# Patient Record
Sex: MALE | Race: WHITE | NOT HISPANIC OR LATINO | Employment: OTHER | URBAN - METROPOLITAN AREA
[De-identification: names, ages, dates, MRNs, and addresses within clinical notes are randomized per-mention and may not be internally consistent; named-entity substitution may affect disease eponyms.]

---

## 2017-06-14 ENCOUNTER — APPOINTMENT (OUTPATIENT)
Dept: LAB | Facility: CLINIC | Age: 77
End: 2017-06-14
Payer: MEDICARE

## 2017-06-14 DIAGNOSIS — E78.5 HYPERLIPIDEMIA: ICD-10-CM

## 2017-06-14 LAB
ALBUMIN SERPL BCP-MCNC: 3.4 G/DL (ref 3.5–5)
ALP SERPL-CCNC: 84 U/L (ref 46–116)
ALT SERPL W P-5'-P-CCNC: 24 U/L (ref 12–78)
ANION GAP SERPL CALCULATED.3IONS-SCNC: 9 MMOL/L (ref 4–13)
AST SERPL W P-5'-P-CCNC: 19 U/L (ref 5–45)
BASOPHILS # BLD AUTO: 0.03 THOUSANDS/ΜL (ref 0–0.1)
BASOPHILS NFR BLD AUTO: 1 % (ref 0–1)
BILIRUB SERPL-MCNC: 0.88 MG/DL (ref 0.2–1)
BUN SERPL-MCNC: 23 MG/DL (ref 5–25)
CALCIUM SERPL-MCNC: 9.1 MG/DL (ref 8.3–10.1)
CHLORIDE SERPL-SCNC: 108 MMOL/L (ref 100–108)
CHOLEST SERPL-MCNC: 158 MG/DL (ref 50–200)
CO2 SERPL-SCNC: 25 MMOL/L (ref 21–32)
CREAT SERPL-MCNC: 0.92 MG/DL (ref 0.6–1.3)
EOSINOPHIL # BLD AUTO: 0.12 THOUSAND/ΜL (ref 0–0.61)
EOSINOPHIL NFR BLD AUTO: 2 % (ref 0–6)
ERYTHROCYTE [DISTWIDTH] IN BLOOD BY AUTOMATED COUNT: 14.3 % (ref 11.6–15.1)
GFR SERPL CREATININE-BSD FRML MDRD: >60 ML/MIN/1.73SQ M
GLUCOSE P FAST SERPL-MCNC: 94 MG/DL (ref 65–99)
HCT VFR BLD AUTO: 44.5 % (ref 36.5–49.3)
HDLC SERPL-MCNC: 62 MG/DL (ref 40–60)
HGB BLD-MCNC: 15 G/DL (ref 12–17)
LDLC SERPL CALC-MCNC: 78 MG/DL (ref 0–100)
LYMPHOCYTES # BLD AUTO: 1.91 THOUSANDS/ΜL (ref 0.6–4.47)
LYMPHOCYTES NFR BLD AUTO: 34 % (ref 14–44)
MCH RBC QN AUTO: 29.9 PG (ref 26.8–34.3)
MCHC RBC AUTO-ENTMCNC: 33.7 G/DL (ref 31.4–37.4)
MCV RBC AUTO: 89 FL (ref 82–98)
MONOCYTES # BLD AUTO: 0.5 THOUSAND/ΜL (ref 0.17–1.22)
MONOCYTES NFR BLD AUTO: 9 % (ref 4–12)
NEUTROPHILS # BLD AUTO: 3.02 THOUSANDS/ΜL (ref 1.85–7.62)
NEUTS SEG NFR BLD AUTO: 54 % (ref 43–75)
NRBC BLD AUTO-RTO: 0 /100 WBCS
PLATELET # BLD AUTO: 175 THOUSANDS/UL (ref 149–390)
PMV BLD AUTO: 9.5 FL (ref 8.9–12.7)
POTASSIUM SERPL-SCNC: 3.7 MMOL/L (ref 3.5–5.3)
PROT SERPL-MCNC: 6.4 G/DL (ref 6.4–8.2)
RBC # BLD AUTO: 5.02 MILLION/UL (ref 3.88–5.62)
SODIUM SERPL-SCNC: 142 MMOL/L (ref 136–145)
TRIGL SERPL-MCNC: 90 MG/DL
WBC # BLD AUTO: 5.59 THOUSAND/UL (ref 4.31–10.16)

## 2017-06-14 PROCEDURE — 36415 COLL VENOUS BLD VENIPUNCTURE: CPT

## 2017-06-14 PROCEDURE — 85025 COMPLETE CBC W/AUTO DIFF WBC: CPT

## 2017-06-14 PROCEDURE — 80061 LIPID PANEL: CPT

## 2017-06-14 PROCEDURE — 80053 COMPREHEN METABOLIC PANEL: CPT

## 2017-06-15 ENCOUNTER — GENERIC CONVERSION - ENCOUNTER (OUTPATIENT)
Dept: OTHER | Facility: OTHER | Age: 77
End: 2017-06-15

## 2017-06-16 ENCOUNTER — ALLSCRIPTS OFFICE VISIT (OUTPATIENT)
Dept: OTHER | Facility: OTHER | Age: 77
End: 2017-06-16

## 2017-09-11 ENCOUNTER — ALLSCRIPTS OFFICE VISIT (OUTPATIENT)
Dept: OTHER | Facility: OTHER | Age: 77
End: 2017-09-11

## 2017-12-16 DIAGNOSIS — I48.91 ATRIAL FIBRILLATION (HCC): ICD-10-CM

## 2017-12-16 DIAGNOSIS — E78.5 HYPERLIPIDEMIA: ICD-10-CM

## 2017-12-20 ENCOUNTER — ALLSCRIPTS OFFICE VISIT (OUTPATIENT)
Dept: OTHER | Facility: OTHER | Age: 77
End: 2017-12-20

## 2017-12-20 ENCOUNTER — GENERIC CONVERSION - ENCOUNTER (OUTPATIENT)
Dept: OTHER | Facility: OTHER | Age: 77
End: 2017-12-20

## 2018-01-11 NOTE — PROGRESS NOTES
Assessment    1  Encounter for preventive health examination (V70 0) (Z00 00)    Plan  Health Maintenance    · Begin a limited exercise program ; Status:Complete;   Done: 96PDX6052   · There are many exercise options for seniors ; Status:Complete;   Done: 44DMU3760    Discussion/Summary  Impression: Initial Annual Wellness Visit, with preventive exam as well as age and risk appropriate counseling completed  Cardiovascular screening and counseling: screening is current  Diabetes screening and counseling: screening is current  Colorectal cancer screening and counseling: screening is current  Prostate cancer screening and counseling: the risks and benefits of screening were discussed and screening is current  Glaucoma screening and counseling: screening is current  Immunizations: the patient declines the influenza vaccination and the patient declines the pneumococcal vaccination  Advance Directive Planning: paperwork and instructions were given to the patient  Chief Complaint  MEDICARE WELLNESS      History of Present Illness  Welcome to Medicare and Wellness Visits: The patient is being seen for the initial annual wellness visit and His questionnaire was reviewed and a copy is in the chart  Medicare Screening and Risk Factors   Hospitalizations: no previous hospitalizations  Medicare Screening Tests Risk Questions   Drug and Alcohol Use: The patient has never smoked cigarettes  The patient reports drinking 4WINE drinks per week  He has never used illicit drugs  Diet and Physical Activity: Current diet includes frequent junk food, 0 servings of fruit per day, 1 servings of meat per day, 1 cups of coffee per day and 2 GLASS OF WATER  The patient does not exercise  Mood Disorder and Cognitive Impairment Screening: He denies feeling down, depressed, or hopeless over the past two weeks  He denies feeling little interest or pleasure in doing things over the past two weeks     Cognitive impairment screening: difficulty learning/retaining new information, denies difficulty handling complex tasks, denies difficulty with reasoning, denies difficulty with spatial ability and orientation, denies difficulty with language and denies difficulty with behavior  Functional Ability/Level of Safety: Hearing is slightly decreased, slightly decreased in the right ear and slightly decreased in the left ear  The patient is currently able to participate in social activities without limitations and able to drive without limitations  Activities of daily living details: does not need help using the phone, no transportation help needed, does not need help shopping, no meal preparation help needed, does not need help doing housework, does not need help doing laundry, does not need help managing medications and does not need help managing money  Fall risk factors: The patient fell 0 times in the past 12 months  Home safety risk factors:  no unfamiliar surroundings, no loose rugs, no poor household lighting, no uneven floors, no household clutter, grab bars in the bathroom and handrails on the stairs  Advance Directives: Advance directives: no living will, no durable power of  for health care directives and no advance directives  Co-Managers and Medical Equipment/Suppliers: See Patient Care Team      Patient Care Team    Care Team Member Role Specialty Office Number   [facility]  Cardiology [phone]     Review of Systems    Cardiovascular: no chest pain  Respiratory: no shortness of breath  Gastrointestinal: no abdominal pain  Active Problems     1  Glaucoma (365 9) (H40 9)   2  Renal lithiasis (592 0) (N20 0)   3   Screening for prostate cancer (V76 44) (Z12 5)    Benign essential hypertension (401 1) (I10)       Hyperlipidemia (272 4) (E78 5)       Anxiety disorder (300 00) (F41 9)       Atrial fibrillation (427 31) (I48 91)     - Dr Fredi Clancy          Past Medical History    · History of Dog bite of forearm (881 00,E906 0) (T33 089O,E55  0XXA)   · History of epistaxis (V12 69) (B87 438)   · History of Retinal hemorrhage, 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)    The active problems and past medical history were reviewed and updated today  Surgical History    · History of Renal Lithotripsy    The surgical history was reviewed and updated today  Family History  Mother    · Family history of   Father    · Family history of     The family history was reviewed and updated today  Social History    · Former smoker (G44 70) (G02 461)   · Denied: History of High risk sexual behavior   · Denied: History of Illicit drug use   ·    · Retired  The social history was reviewed and updated today  Current Meds   1  Alphagan P 0 1 % Ophthalmic Solution; Therapy: (Recorded:10Fjp7037) to Recorded   2  AmLODIPine Besylate 2 5 MG Oral Tablet; TAKE 1 TABLET DAILY; Last Rx:2016   Ordered   3  Aspir-81 81 MG Oral Tablet Delayed Release; Take 1 tablet daily; Therapy: 09TEX3407 to Recorded   4  Atenolol 25 MG Oral Tablet; TAKE 3 TABLET Daily; Therapy: (Recorded:34Uef3023) to  Requested for: 03Vqt1146 Recorded   5  Atorvastatin Calcium 20 MG Oral Tablet; TAKE 1 TABLET DAILY AT BEDTIME; Last   Rx:2016 Ordered   6  FLUoxetine HCl - 20 MG Oral Capsule; TAKE 1 CAPSULE DAILY; Last Rx:2016   Ordered    The medication list was reviewed and updated today  Allergies    1  No Known Drug Allergies    Immunizations   1    Tdap  16-Oct-2014     Vitals  Signs    Systolic: 879  Diastolic: 92  Heart Rate: 91  Temperature: 98 4 F  O2 Saturation: 99  Height: 5 ft 7 in  Weight: 191 lb 4 00 oz  BMI Calculated: 29 95  BSA Calculated: 1 98    Physical Exam    Constitutional   General appearance: No acute distress, well appearing and well nourished  Pulmonary   Respiratory effort: No increased work of breathing or signs of respiratory distress  Auscultation of lungs: Clear to auscultation  Cardiovascular   Auscultation of heart: Normal rate and rhythm, normal S1 and S2, no murmurs  Health Management  Health Maintenance   *VB - Urinary Incontinence Screen (Dx Z13 89 Screen for UI); every 1 year; Last  49FFI9939; Next Due: 28NIL0915;  Overdue    Future Appointments    Date/Time Provider Specialty Site   03/23/2017 10:20 AM Rhonda Bush MD Internal Medicine [facility] INTERNAL MED     Signatures   Electronically signed by : Naseem Peguero MD; Nov 17 2016 11:17AM EST                       (Author)

## 2018-01-13 VITALS
BODY MASS INDEX: 26.06 KG/M2 | SYSTOLIC BLOOD PRESSURE: 130 MMHG | WEIGHT: 166 LBS | DIASTOLIC BLOOD PRESSURE: 80 MMHG | OXYGEN SATURATION: 99 % | HEIGHT: 67 IN | HEART RATE: 82 BPM

## 2018-01-14 VITALS
WEIGHT: 173 LBS | BODY MASS INDEX: 27.15 KG/M2 | TEMPERATURE: 98.4 F | DIASTOLIC BLOOD PRESSURE: 84 MMHG | SYSTOLIC BLOOD PRESSURE: 126 MMHG | HEIGHT: 67 IN | HEART RATE: 94 BPM | OXYGEN SATURATION: 98 %

## 2018-01-15 NOTE — RESULT NOTES
Verified Results  (1) CBC/PLT/DIFF 79DYL7282 11:54AM Nayeli Arita Order Number: [id number]   Order Number: [id number]     Test Name Result Flag Reference   WBC COUNT 6 48 Thousand/uL  4 31-10 16   RBC COUNT 5 40 Million/uL  3 88-5 62   HEMOGLOBIN 15 7 g/dL  12 0-17 0   HEMATOCRIT 47 1 %  36 5-49 3   MCV 87 fL  82-98   MCH 29 1 pg  26 8-34 3   MCHC 33 3 g/dL  31 4-37 4   RDW 13 6 %  11 6-15 1   MPV 9 1 fL  8 9-12 7   PLATELET COUNT 103 Thousands/uL  149-390   nRBC AUTOMATED 0 /100 WBCs     This is an appended report  These results have been appended to a previously verified report  NEUTROPHILS - REL 65 %  43-75   LYMPHOCYTES - REL 25 %  14-44   MONOCYTES - REL 8 %  4-12   EOSINOPHILS - REL 0 %  0-6   BASOPHILS - REL 0 %  0-1   ATYPICAL LYMPH 2 % H <=0   NEUTROPHILS ABS 4 21 Thousand/uL  1 85-7 62   LYMPHOTCYTES ABS 1 62 Thousand/uL  0 60-4 47   MONOCYTES ABS 0 52 Thousand/uL  0 00-1 22   EOSINOPHILS ABS 0 00 Thousand/uL  0 00-0 40   BASOPHILS ABS 0 00 Thousand/uL  0 00-0 10   TOTAL COUNTED      RBC MORPHOLOGY Normal     PLT ESTIMATE Adequate  Adequate     (1) COMPREHENSIVE METABOLIC PANEL 82PBC9860 62:80FL Nayeli Arita Order Number: [id number]   Order Number: [id number] Order Number: [id number]     Test Name Result Flag Reference   GLUCOSE,RANDM 93 mg/dL     If the patient is fasting, the ADA then defines impaired fasting glucose as > 100 mg/dL and diabetes as > or equal to 123 mg/dL     SODIUM 141 mmol/L  136-145   POTASSIUM 4 3 mmol/L  3 5-5 3   CHLORIDE 105 mmol/L  100-108   CARBON DIOXIDE 28 mmol/L  21-32   ANION GAP (CALC) 8 mmol/L  4-13   BLOOD UREA NITROGEN 21 mg/dL  5-25   CREATININE 1 04 mg/dL  0 60-1 30   Standardized to IDMS reference method   CALCIUM 8 8 mg/dL  8 3-10 1   BILI, TOTAL 0 89 mg/dL  0 20-1 00   ALK PHOSPHATAS 93 U/L     ALT (SGPT) 25 U/L  12-78   AST(SGOT) 18 U/L  5-45   ALBUMIN 3 6 g/dL  3 5-5 0   TOTAL PROTEIN 6 4 g/dL  6 4-8 2   eGFR Non- American      >60 0 ml/min/1 [address] [facility] Disease Education Program recommendations are as follows:  GFR calculation is accurate only with a steady state creatinine  Chronic Kidney disease less than 60 ml/min/1 73 sq  meters  Kidney failure less than 15 ml/min/1 73 sq  meters  (1) LIPID PANEL, FASTING 08Jun2016 11:54AM Nayeli Arita Order Number: [id number]  TW Order Number: [id number] Order Number: [id number]     Test Name Result Flag Reference   CHOLESTEROL 181 mg/dL     HDL,DIRECT 58 mg/dL  40-60   Specimen collection should occur prior to Metamizole administration due to the potential for falsely depressed results  LDL CHOLESTEROL CALCULATED 101 mg/dL H 0-100   Triglyceride:         Normal              <150 mg/dl       Borderline High    150-199 mg/dl       High               200-499 mg/dl       Very High          >499 mg/dl  Cholesterol:         Desirable        <200 mg/dl      Borderline High  200-239 mg/dl      High             >239 mg/dl  HDL Cholesterol:        High    >59 mg/dL      Low     <41 mg/dL  LDL CALCULATED:    This screening LDL is a calculated result  It does not have the accuracy of the Direct Measured LDL in the monitoring of patients with hyperlipidemia and/or statin therapy  Direct Measure LDL (BAG698) must be ordered separately in these patients  TRIGLYCERIDES 111 mg/dL  <=150   Specimen collection should occur prior to N-Acetylcysteine or Metamizole administration due to the potential for falsely depressed results

## 2018-01-23 NOTE — PROGRESS NOTES
Assessment    1  Encounter for preventive health examination (V70 0) (Z00 00)    Plan  Health Maintenance    · Begin a limited exercise program ; Status:Complete;   Done: 08KLI9104   · There are many exercise options for seniors ; Status:Complete;   Done: 13INS8513   · *VB - Urinary Incontinence Screen (Dx Z13 89 Screen for UI) ; every 1 year; Last  15TLF2718; Next 54Qxz4929; Status:Active  Screening for genitourinary condition    · *VB - Urinary Incontinence Screen (Dx Z13 89 Screen for UI); Status:Complete -  Retrospective By Protocol Authorization;   Done: 22ERD9588 11:21AM    Discussion/Summary  Impression: Subsequent Annual Wellness Visit, with preventive exam as well as age and risk appropriate counseling completed  Cardiovascular screening and counseling: screening is current  Diabetes screening and counseling: screening is current  Colorectal cancer screening and counseling: screening not indicated  Prostate cancer screening and counseling: screening not indicated  Glaucoma screening and counseling: screening is current  Advance Directive Planning: paperwork and instructions were given to the patient, he was encouraged to follow-up with me to discuss his questions and/or decisions  Patient Discussion: plan discussed with the patient, follow-up visit needed in one year  History of Present Illness  Welcome to Medicare and Wellness Visits: The patient is being seen for the subsequent annual wellness visit and The patient's questionnaire was reviewed with them and a copy is in the chart as well  Medicare Screening and Risk Factors   Hospitalizations: no previous hospitalizations  Medicare Screening Tests Risk Questions   Drug and Alcohol Use: The patient has never smoked cigarettes  The patient reports drinking 5 drinks per week and wine  Alcohol concern:   The patient has no concerns about alcohol abuse  He has never used illicit drugs     Diet and Physical Activity: Current diet includes frequent junk food, 0 servings of fruit per day, 1 servings of meat per day, 1 servings of whole grains per day, 1 cups of coffee per day and water-3  The patient does not exercise  Mood Disorder and Cognitive Impairment Screening: He denies feeling down, depressed, or hopeless over the past two weeks  He denies feeling little interest or pleasure in doing things over the past two weeks  Cognitive impairment screening: difficulty learning/retaining new information, denies difficulty handling complex tasks, denies difficulty with reasoning, denies difficulty with spatial ability and orientation, denies difficulty with language and denies difficulty with behavior  Functional Ability/Level of Safety: Hearing is normal bilaterally  He does not use a hearing aid  Activities of daily living details: does not need help using the phone, no transportation help needed, does not need help shopping, no meal preparation help needed, does not need help doing housework, does not need help doing laundry, does not need help managing medications and does not need help managing money  Fall risk factors: The patient fell 0 times in the past 12 months  Home safety risk factors:  no unfamiliar surroundings, no loose rugs, no poor household lighting, no uneven floors, no household clutter, grab bars in the bathroom and handrails on the stairs  Advance Directives: Advance directives: no living will, no durable power of  for health care directives and no advance directives  Co-Managers and Medical Equipment/Suppliers: See Patient Care Team      Patient Care Team    Care Team Member Role Specialty Office Number   [facility]  Cardiology [phone]     Review of Systems    Cardiovascular: no chest pain  Respiratory: no shortness of breath  Gastrointestinal: no abdominal pain  Active Problems    1  Glaucoma (365 9) (H40 9)   2  Multiple nevi (216 9) (D22 9)   3   Need for influenza vaccination (V04 81) (Z23)   4  Need for pneumococcal vaccination (V03 82) (Z23)   5  Renal lithiasis (592 0) (N20 0)   6  Screening for prostate cancer (V76 44) (Z12 5)    Past Medical History    · History of Dog bite of forearm (881 00,E906 0) (B64 110Y,E41  0XXA)   · History of epistaxis (V12 69) (X85 569)   · History of Retinal hemorrhage, 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)    The active problems and past medical history were reviewed and updated today  Surgical History    · History of Renal Lithotripsy    The surgical history was reviewed and updated today  Family History  Mother    · Family history of   Father    · Family history of     The family history was reviewed and updated today  Social History    · Former smoker (B25 52) (W07 035)   · Denied: History of High risk sexual behavior   · Denied: History of Illicit drug use   ·    · Retired  The social history was reviewed and updated today  Current Meds   1  AmLODIPine Besylate 2 5 MG Oral Tablet; TAKE 1 TABLET DAILY; Last Rx:2017   Ordered   2  Aspir-81 81 MG Oral Tablet Delayed Release; Take 1 tablet daily; Therapy: 01AMS8246 to Recorded   3  Atenolol 50 MG Oral Tablet; TAKE 1 TABLET TWICE DAILY; Therapy: (Maxwell Watts) to  Requested for: 48Tbb3197 Recorded   4  Atorvastatin Calcium 20 MG Oral Tablet; take 1 tablet daily at bedtime; Last   Rx:2017 Ordered   5  FLUoxetine HCl - 20 MG Oral Capsule; TAKE 1 CAPSULE DAILY; Last Rx:2017   Ordered    The medication list was reviewed and updated today  Allergies    1  No Known Drug Allergies    Immunizations   1    Influenza  Temporarily Deferred: Pt refuses, As of: 72Ezb4601, Defer for 1 Years    PCV  Temporarily Deferred: Pt refuses, As of: 18Vsl6406, Defer for 1 Years    Tdap  16-Oct-2014     Physical Exam    Constitutional   General appearance: No acute distress, well appearing and well nourished      Psychiatric   Judgment and insight: Normal     Orientation to person, place and time: Normal     Recent and remote memory: Intact  Mood and affect: Normal        Results/Data  *VB - Urinary Incontinence Screen (Dx Z13 89 Screen for UI) 79Mdt8980 11:21AM Khushboo Donovan     Test Name Result Flag Reference   Urinary Incontinence Assessment 69Tha6450       Falls Risk Assessment (Dx Z13 89 Screen for Neurologic Disorder) 20Dec2017 11:20AM User, Ahs     Test Name Result Flag Reference   Falls Risk      No falls in the past year     PHQ-9 Adult Depression Screening 20Dec2017 11:20AM User, Ahs     Test Name Result Flag Reference   PHQ-9 Adult Depression Score 4     Over the last two weeks, how often have you been bothered by any of the following problems? Little interest or pleasure in doing things: Several days - 1  Feeling down, depressed, or hopeless: Several days - 1  Trouble falling or staying asleep, or sleeping too much: Several days - 1  Feeling tired or having little energy: Several days - 1  Poor appetite or over eating: Not at all - 0  Feeling bad about yourself - or that you are a failure or have let yourself or your family down: Not at all - 0  Trouble concentrating on things, such as reading the newspaper or watching television: Not at all - 0  Moving or speaking so slowly that other people could have noticed  Or the opposite -  being so fidgety or restless that you have been moving around a lot more than usual: Not at all - 0  Thoughts that you would be better off dead, or of hurting yourself in some way: Not at all - 0   PHQ-9 Adult Depression Screening Negative     PHQ-9 Difficulty Level Not difficult at all     PHQ-9 Severity Minimal Depression         Health Management  Health Maintenance   *VB - Urinary Incontinence Screen (Dx Z13 89 Screen for UI); every 1 year; Last  50Fap8353; Next Due: 22Pat9847;  Active    Future Appointments    Date/Time Provider Specialty Site   06/20/2018 10:40 AM Khushboo Donovan MD Internal Medicine [facility] INTERNAL MED Signatures   Electronically signed by : Leandro Mixon MD; Dec 20 2017 11:41AM EST                       (Author)

## 2018-01-24 VITALS
HEART RATE: 113 BPM | WEIGHT: 174.38 LBS | HEIGHT: 67 IN | OXYGEN SATURATION: 98 % | SYSTOLIC BLOOD PRESSURE: 126 MMHG | TEMPERATURE: 97.2 F | BODY MASS INDEX: 27.37 KG/M2 | DIASTOLIC BLOOD PRESSURE: 74 MMHG

## 2018-04-06 ENCOUNTER — APPOINTMENT (OUTPATIENT)
Dept: LAB | Facility: CLINIC | Age: 78
End: 2018-04-06
Payer: MEDICARE

## 2018-04-06 DIAGNOSIS — I48.91 ATRIAL FIBRILLATION (HCC): ICD-10-CM

## 2018-04-06 DIAGNOSIS — E78.5 HYPERLIPIDEMIA: ICD-10-CM

## 2018-04-06 LAB
ALBUMIN SERPL BCP-MCNC: 3.5 G/DL (ref 3.5–5)
ALP SERPL-CCNC: 104 U/L (ref 46–116)
ALT SERPL W P-5'-P-CCNC: 28 U/L (ref 12–78)
ANION GAP SERPL CALCULATED.3IONS-SCNC: 4 MMOL/L (ref 4–13)
AST SERPL W P-5'-P-CCNC: 22 U/L (ref 5–45)
BASOPHILS # BLD AUTO: 0.04 THOUSANDS/ΜL (ref 0–0.1)
BASOPHILS NFR BLD AUTO: 1 % (ref 0–1)
BILIRUB SERPL-MCNC: 0.9 MG/DL (ref 0.2–1)
BUN SERPL-MCNC: 23 MG/DL (ref 5–25)
CALCIUM SERPL-MCNC: 8.9 MG/DL (ref 8.3–10.1)
CHLORIDE SERPL-SCNC: 106 MMOL/L (ref 100–108)
CHOLEST SERPL-MCNC: 197 MG/DL (ref 50–200)
CO2 SERPL-SCNC: 29 MMOL/L (ref 21–32)
CREAT SERPL-MCNC: 1.16 MG/DL (ref 0.6–1.3)
EOSINOPHIL # BLD AUTO: 0.12 THOUSAND/ΜL (ref 0–0.61)
EOSINOPHIL NFR BLD AUTO: 2 % (ref 0–6)
ERYTHROCYTE [DISTWIDTH] IN BLOOD BY AUTOMATED COUNT: 13.5 % (ref 11.6–15.1)
GFR SERPL CREATININE-BSD FRML MDRD: 60 ML/MIN/1.73SQ M
GLUCOSE P FAST SERPL-MCNC: 97 MG/DL (ref 65–99)
HCT VFR BLD AUTO: 48.5 % (ref 36.5–49.3)
HDLC SERPL-MCNC: 61 MG/DL (ref 40–60)
HGB BLD-MCNC: 15.7 G/DL (ref 12–17)
LDLC SERPL CALC-MCNC: 114 MG/DL (ref 0–100)
LYMPHOCYTES # BLD AUTO: 1.91 THOUSANDS/ΜL (ref 0.6–4.47)
LYMPHOCYTES NFR BLD AUTO: 34 % (ref 14–44)
MCH RBC QN AUTO: 29.7 PG (ref 26.8–34.3)
MCHC RBC AUTO-ENTMCNC: 32.4 G/DL (ref 31.4–37.4)
MCV RBC AUTO: 92 FL (ref 82–98)
MONOCYTES # BLD AUTO: 0.58 THOUSAND/ΜL (ref 0.17–1.22)
MONOCYTES NFR BLD AUTO: 10 % (ref 4–12)
NEUTROPHILS # BLD AUTO: 2.97 THOUSANDS/ΜL (ref 1.85–7.62)
NEUTS SEG NFR BLD AUTO: 53 % (ref 43–75)
NONHDLC SERPL-MCNC: 136 MG/DL
NRBC BLD AUTO-RTO: 0 /100 WBCS
PLATELET # BLD AUTO: 194 THOUSANDS/UL (ref 149–390)
PMV BLD AUTO: 9.4 FL (ref 8.9–12.7)
POTASSIUM SERPL-SCNC: 4.4 MMOL/L (ref 3.5–5.3)
PROT SERPL-MCNC: 6.9 G/DL (ref 6.4–8.2)
RBC # BLD AUTO: 5.28 MILLION/UL (ref 3.88–5.62)
SODIUM SERPL-SCNC: 139 MMOL/L (ref 136–145)
TRIGL SERPL-MCNC: 111 MG/DL
WBC # BLD AUTO: 5.63 THOUSAND/UL (ref 4.31–10.16)

## 2018-04-06 PROCEDURE — 80053 COMPREHEN METABOLIC PANEL: CPT

## 2018-04-06 PROCEDURE — 85025 COMPLETE CBC W/AUTO DIFF WBC: CPT

## 2018-04-06 PROCEDURE — 36415 COLL VENOUS BLD VENIPUNCTURE: CPT

## 2018-04-06 PROCEDURE — 80061 LIPID PANEL: CPT

## 2018-09-14 DIAGNOSIS — F41.9 ANXIETY: Primary | ICD-10-CM

## 2018-09-14 DIAGNOSIS — I10 ESSENTIAL HYPERTENSION: ICD-10-CM

## 2018-09-14 DIAGNOSIS — E78.2 MIXED HYPERLIPIDEMIA: ICD-10-CM

## 2018-09-14 RX ORDER — FLUOXETINE HYDROCHLORIDE 20 MG/1
CAPSULE ORAL
Qty: 90 CAPSULE | Refills: 0 | Status: SHIPPED | OUTPATIENT
Start: 2018-09-14 | End: 2018-12-14 | Stop reason: SDUPTHER

## 2018-09-14 RX ORDER — AMLODIPINE BESYLATE 2.5 MG/1
TABLET ORAL
Qty: 90 TABLET | Refills: 0 | Status: SHIPPED | OUTPATIENT
Start: 2018-09-14 | End: 2018-12-14 | Stop reason: SDUPTHER

## 2018-09-14 RX ORDER — ATORVASTATIN CALCIUM 20 MG/1
TABLET, FILM COATED ORAL
Qty: 90 TABLET | Refills: 0 | Status: SHIPPED | OUTPATIENT
Start: 2018-09-14 | End: 2018-12-14 | Stop reason: SDUPTHER

## 2018-10-12 ENCOUNTER — OFFICE VISIT (OUTPATIENT)
Dept: INTERNAL MEDICINE CLINIC | Facility: CLINIC | Age: 78
End: 2018-10-12
Payer: MEDICARE

## 2018-10-12 VITALS
HEART RATE: 118 BPM | WEIGHT: 188 LBS | HEIGHT: 68 IN | SYSTOLIC BLOOD PRESSURE: 148 MMHG | RESPIRATION RATE: 18 BRPM | OXYGEN SATURATION: 95 % | BODY MASS INDEX: 28.49 KG/M2 | DIASTOLIC BLOOD PRESSURE: 80 MMHG

## 2018-10-12 DIAGNOSIS — Z23 NEED FOR VACCINATION: ICD-10-CM

## 2018-10-12 DIAGNOSIS — I48.91 ATRIAL FIBRILLATION, UNSPECIFIED TYPE (HCC): ICD-10-CM

## 2018-10-12 DIAGNOSIS — Z12.5 SCREENING FOR PROSTATE CANCER: ICD-10-CM

## 2018-10-12 DIAGNOSIS — I10 BENIGN ESSENTIAL HYPERTENSION: Primary | ICD-10-CM

## 2018-10-12 DIAGNOSIS — E78.2 MIXED HYPERLIPIDEMIA: ICD-10-CM

## 2018-10-12 PROCEDURE — 99214 OFFICE O/P EST MOD 30 MIN: CPT | Performed by: INTERNAL MEDICINE

## 2018-10-12 RX ORDER — ATENOLOL 50 MG/1
TABLET ORAL
COMMUNITY
Start: 2018-07-27 | End: 2019-05-17 | Stop reason: SDUPTHER

## 2018-10-12 NOTE — PROGRESS NOTES
Assessment/Plan:      Chronic problems are stable  Continue present medications  Continue diet and exercise  Ordered labs for next visit  Return in about 6 months (around 4/12/2019)  No problem-specific Assessment & Plan notes found for this encounter  Diagnoses and all orders for this visit:    Benign essential hypertension  -     CBC and differential; Future  -     Comprehensive metabolic panel; Future  -     Lipid panel; Future    Mixed hyperlipidemia    Atrial fibrillation, unspecified type ([facility] [address] )    Need for vaccination  -     influenza vaccine, 8692-0640, high-dose, PF 0 5 mL, for patients 65 yr+ (FLUZONE HIGH-DOSE)    Screening for prostate cancer  -     PSA, Total Screen; Future    Other orders  -     atenolol (TENORMIN) 50 mg tablet;           Subjective:      Patient ID: Jen Dang is a 66 y o  male  Patient comes in today for routine follow-up  He states he is doing well with no new complaints  His blood pressure has been controlled  He usually gets anxious coming here  He reports his AFib has been controlled  Initially his heart rate was running a little high but he does not feel anything  Taking his medicines as directed and continues to follow with Cardiology    Reports he had recent carotids which were normal         ALLERGIES:  No Known Allergies    CURRENT MEDICATIONS:    Current Outpatient Prescriptions:     amLODIPine (NORVASC) 2 5 mg tablet, TAKE 1 TABLET BY MOUTH  DAILY, Disp: 90 tablet, Rfl: 0    atenolol (TENORMIN) 50 mg tablet, , Disp: , Rfl:     atorvastatin (LIPITOR) 20 mg tablet, TAKE 1 TABLET BY MOUTH  DAILY AT BEDTIME, Disp: 90 tablet, Rfl: 0    FLUoxetine (PROzac) 20 mg capsule, TAKE 1 CAPSULE BY MOUTH  DAILY, Disp: 90 capsule, Rfl: 0    ACTIVE PROBLEM LIST:  Patient Active Problem List   Diagnosis    Benign essential hypertension    Hyperlipidemia    Glaucoma    Anxiety disorder    Atrial fibrillation (HCC)       PAST MEDICAL HISTORY:  Past Medical History:   Diagnosis Date    Retinal hemorrhage, left     last assessed: 9/2/2015       PAST SURGICAL HISTORY:  Past Surgical History:   Procedure Laterality Date    LITHOTRIPSY         FAMILY HISTORY:  No family history on file  SOCIAL HISTORY:  Social History     Social History    Marital status: /Civil Union     Spouse name: N/A    Number of children: N/A    Years of education: N/A     Occupational History    retired      Social History Main Topics    Smoking status: Former Smoker    Smokeless tobacco: Never Used    Alcohol use Not on file    Drug use: No    Sexual activity: Not on file      Comment: denied: histroy of high risk sexual behavior     Other Topics Concern    Not on file     Social History Narrative    No narrative on file       Review of Systems   Respiratory: Negative for shortness of breath  Cardiovascular: Negative for chest pain  Gastrointestinal: Negative for abdominal pain  Objective:  Vitals:    10/12/18 0938   BP: 148/80   BP Location: Left arm   Patient Position: Sitting   Cuff Size: Adult   Pulse: (!) 118   Resp: 18   SpO2: 95%   Weight: 85 3 kg (188 lb)   Height: 5' 8" (1 727 m)        Physical Exam   Constitutional: He is oriented to person, place, and time  He appears well-developed and well-nourished  Cardiovascular: Normal rate, regular rhythm and normal heart sounds  Pulmonary/Chest: Effort normal and breath sounds normal    Abdominal: Soft  Bowel sounds are normal    Musculoskeletal: He exhibits no edema  Neurological: He is alert and oriented to person, place, and time  Nursing note and vitals reviewed  RESULTS:    No results found for this or any previous visit (from the past 1008 hour(s))  This note was created with voice recognition software  Phonic, grammatical and spelling errors may be present within the note as a result

## 2018-12-14 DIAGNOSIS — E78.2 MIXED HYPERLIPIDEMIA: ICD-10-CM

## 2018-12-14 DIAGNOSIS — I10 ESSENTIAL HYPERTENSION: ICD-10-CM

## 2018-12-14 DIAGNOSIS — F41.9 ANXIETY: ICD-10-CM

## 2018-12-14 RX ORDER — ATORVASTATIN CALCIUM 20 MG/1
TABLET, FILM COATED ORAL
Qty: 90 TABLET | Refills: 1 | Status: SHIPPED | OUTPATIENT
Start: 2018-12-14 | End: 2019-07-18 | Stop reason: SDUPTHER

## 2018-12-14 RX ORDER — FLUOXETINE HYDROCHLORIDE 20 MG/1
CAPSULE ORAL
Qty: 90 CAPSULE | Refills: 1 | Status: SHIPPED | OUTPATIENT
Start: 2018-12-14 | End: 2019-07-18 | Stop reason: SDUPTHER

## 2018-12-14 RX ORDER — AMLODIPINE BESYLATE 2.5 MG/1
TABLET ORAL
Qty: 90 TABLET | Refills: 1 | Status: SHIPPED | OUTPATIENT
Start: 2018-12-14 | End: 2019-07-18 | Stop reason: SDUPTHER

## 2019-05-14 ENCOUNTER — APPOINTMENT (OUTPATIENT)
Dept: LAB | Facility: CLINIC | Age: 79
End: 2019-05-14
Payer: MEDICARE

## 2019-05-14 DIAGNOSIS — Z12.5 SCREENING FOR PROSTATE CANCER: ICD-10-CM

## 2019-05-14 DIAGNOSIS — I10 BENIGN ESSENTIAL HYPERTENSION: ICD-10-CM

## 2019-05-14 LAB
ALBUMIN SERPL BCP-MCNC: 3.7 G/DL (ref 3.5–5)
ALP SERPL-CCNC: 103 U/L (ref 46–116)
ALT SERPL W P-5'-P-CCNC: 37 U/L (ref 12–78)
ANION GAP SERPL CALCULATED.3IONS-SCNC: 4 MMOL/L (ref 4–13)
AST SERPL W P-5'-P-CCNC: 24 U/L (ref 5–45)
BASOPHILS # BLD AUTO: 0.06 THOUSANDS/ΜL (ref 0–0.1)
BASOPHILS NFR BLD AUTO: 1 % (ref 0–1)
BILIRUB SERPL-MCNC: 0.85 MG/DL (ref 0.2–1)
BUN SERPL-MCNC: 15 MG/DL (ref 5–25)
CALCIUM SERPL-MCNC: 9.2 MG/DL (ref 8.3–10.1)
CHLORIDE SERPL-SCNC: 106 MMOL/L (ref 100–108)
CHOLEST SERPL-MCNC: 190 MG/DL (ref 50–200)
CO2 SERPL-SCNC: 30 MMOL/L (ref 21–32)
CREAT SERPL-MCNC: 1.33 MG/DL (ref 0.6–1.3)
EOSINOPHIL # BLD AUTO: 0.12 THOUSAND/ΜL (ref 0–0.61)
EOSINOPHIL NFR BLD AUTO: 2 % (ref 0–6)
ERYTHROCYTE [DISTWIDTH] IN BLOOD BY AUTOMATED COUNT: 13.7 % (ref 11.6–15.1)
GFR SERPL CREATININE-BSD FRML MDRD: 51 ML/MIN/1.73SQ M
GLUCOSE P FAST SERPL-MCNC: 109 MG/DL (ref 65–99)
HCT VFR BLD AUTO: 50.5 % (ref 36.5–49.3)
HDLC SERPL-MCNC: 58 MG/DL (ref 40–60)
HGB BLD-MCNC: 16.1 G/DL (ref 12–17)
IMM GRANULOCYTES # BLD AUTO: 0.01 THOUSAND/UL (ref 0–0.2)
IMM GRANULOCYTES NFR BLD AUTO: 0 % (ref 0–2)
LDLC SERPL CALC-MCNC: 100 MG/DL (ref 0–100)
LYMPHOCYTES # BLD AUTO: 2.09 THOUSANDS/ΜL (ref 0.6–4.47)
LYMPHOCYTES NFR BLD AUTO: 34 % (ref 14–44)
MCH RBC QN AUTO: 29.3 PG (ref 26.8–34.3)
MCHC RBC AUTO-ENTMCNC: 31.9 G/DL (ref 31.4–37.4)
MCV RBC AUTO: 92 FL (ref 82–98)
MONOCYTES # BLD AUTO: 0.58 THOUSAND/ΜL (ref 0.17–1.22)
MONOCYTES NFR BLD AUTO: 10 % (ref 4–12)
NEUTROPHILS # BLD AUTO: 3.21 THOUSANDS/ΜL (ref 1.85–7.62)
NEUTS SEG NFR BLD AUTO: 53 % (ref 43–75)
NONHDLC SERPL-MCNC: 132 MG/DL
NRBC BLD AUTO-RTO: 0 /100 WBCS
PLATELET # BLD AUTO: 209 THOUSANDS/UL (ref 149–390)
PMV BLD AUTO: 9.2 FL (ref 8.9–12.7)
POTASSIUM SERPL-SCNC: 5.1 MMOL/L (ref 3.5–5.3)
PROT SERPL-MCNC: 7.1 G/DL (ref 6.4–8.2)
PSA SERPL-MCNC: 0.7 NG/ML (ref 0–4)
RBC # BLD AUTO: 5.5 MILLION/UL (ref 3.88–5.62)
SODIUM SERPL-SCNC: 140 MMOL/L (ref 136–145)
TRIGL SERPL-MCNC: 160 MG/DL
WBC # BLD AUTO: 6.07 THOUSAND/UL (ref 4.31–10.16)

## 2019-05-14 PROCEDURE — 80061 LIPID PANEL: CPT

## 2019-05-14 PROCEDURE — G0103 PSA SCREENING: HCPCS

## 2019-05-14 PROCEDURE — 36415 COLL VENOUS BLD VENIPUNCTURE: CPT

## 2019-05-14 PROCEDURE — 85025 COMPLETE CBC W/AUTO DIFF WBC: CPT

## 2019-05-14 PROCEDURE — 80053 COMPREHEN METABOLIC PANEL: CPT

## 2019-05-17 ENCOUNTER — OFFICE VISIT (OUTPATIENT)
Dept: INTERNAL MEDICINE CLINIC | Facility: CLINIC | Age: 79
End: 2019-05-17
Payer: MEDICARE

## 2019-05-17 VITALS
HEART RATE: 118 BPM | WEIGHT: 185 LBS | SYSTOLIC BLOOD PRESSURE: 120 MMHG | BODY MASS INDEX: 28.04 KG/M2 | RESPIRATION RATE: 16 BRPM | HEIGHT: 68 IN | OXYGEN SATURATION: 98 % | DIASTOLIC BLOOD PRESSURE: 74 MMHG

## 2019-05-17 DIAGNOSIS — R73.01 IMPAIRED FASTING GLUCOSE: ICD-10-CM

## 2019-05-17 DIAGNOSIS — G25.81 RESTLESS LEG SYNDROME: ICD-10-CM

## 2019-05-17 DIAGNOSIS — I48.91 ATRIAL FIBRILLATION, UNSPECIFIED TYPE (HCC): ICD-10-CM

## 2019-05-17 DIAGNOSIS — E78.2 MIXED HYPERLIPIDEMIA: ICD-10-CM

## 2019-05-17 DIAGNOSIS — F41.9 ANXIETY DISORDER, UNSPECIFIED TYPE: ICD-10-CM

## 2019-05-17 DIAGNOSIS — I10 BENIGN ESSENTIAL HYPERTENSION: Primary | ICD-10-CM

## 2019-05-17 LAB — SL AMB POCT HEMOGLOBIN AIC: 5.6 (ref ?–6.5)

## 2019-05-17 PROCEDURE — 83036 HEMOGLOBIN GLYCOSYLATED A1C: CPT | Performed by: INTERNAL MEDICINE

## 2019-05-17 PROCEDURE — 99214 OFFICE O/P EST MOD 30 MIN: CPT | Performed by: INTERNAL MEDICINE

## 2019-05-17 RX ORDER — ATENOLOL 50 MG/1
50 TABLET ORAL 2 TIMES DAILY
Qty: 60 TABLET
Start: 2019-05-17 | End: 2019-11-21 | Stop reason: SDUPTHER

## 2019-05-17 RX ORDER — ROPINIROLE 0.25 MG/1
0.25 TABLET, FILM COATED ORAL
Qty: 30 TABLET | Refills: 3 | Status: SHIPPED | OUTPATIENT
Start: 2019-05-17 | End: 2019-11-21 | Stop reason: ALTCHOICE

## 2019-07-18 DIAGNOSIS — E78.2 MIXED HYPERLIPIDEMIA: ICD-10-CM

## 2019-07-18 DIAGNOSIS — F41.9 ANXIETY: ICD-10-CM

## 2019-07-18 DIAGNOSIS — I10 ESSENTIAL HYPERTENSION: ICD-10-CM

## 2019-07-18 RX ORDER — ATORVASTATIN CALCIUM 20 MG/1
20 TABLET, FILM COATED ORAL
Qty: 90 TABLET | Refills: 1 | Status: SHIPPED | OUTPATIENT
Start: 2019-07-18 | End: 2019-11-07 | Stop reason: SDUPTHER

## 2019-07-18 RX ORDER — FLUOXETINE HYDROCHLORIDE 20 MG/1
20 CAPSULE ORAL DAILY
Qty: 90 CAPSULE | Refills: 1 | Status: SHIPPED | OUTPATIENT
Start: 2019-07-18 | End: 2019-11-07 | Stop reason: SDUPTHER

## 2019-07-18 RX ORDER — AMLODIPINE BESYLATE 2.5 MG/1
2.5 TABLET ORAL DAILY
Qty: 90 TABLET | Refills: 1 | Status: SHIPPED | OUTPATIENT
Start: 2019-07-18 | End: 2019-11-07 | Stop reason: SDUPTHER

## 2019-09-03 ENCOUNTER — OFFICE VISIT (OUTPATIENT)
Dept: INTERNAL MEDICINE CLINIC | Facility: CLINIC | Age: 79
End: 2019-09-03
Payer: MEDICARE

## 2019-09-03 VITALS
SYSTOLIC BLOOD PRESSURE: 140 MMHG | DIASTOLIC BLOOD PRESSURE: 90 MMHG | RESPIRATION RATE: 16 BRPM | WEIGHT: 181 LBS | HEART RATE: 100 BPM | BODY MASS INDEX: 27.43 KG/M2 | HEIGHT: 68 IN | OXYGEN SATURATION: 98 %

## 2019-09-03 DIAGNOSIS — F43.21 GRIEF: Primary | ICD-10-CM

## 2019-09-03 PROCEDURE — 99213 OFFICE O/P EST LOW 20 MIN: CPT | Performed by: PHYSICIAN ASSISTANT

## 2019-09-03 RX ORDER — LORAZEPAM 0.5 MG/1
0.5 TABLET ORAL 2 TIMES DAILY PRN
Qty: 10 TABLET | Refills: 0 | Status: SHIPPED | OUTPATIENT
Start: 2019-09-03 | End: 2019-11-21 | Stop reason: ALTCHOICE

## 2019-09-03 NOTE — PROGRESS NOTES
Assessment/Plan:      Patient Instructions   Will make low-dose lorazepam available for anxiety  Patient will make follow-up appointment in 1 week for ear irrigation  BMI Counseling: Body mass index is 27 52 kg/m²  Discussed the patient's BMI with him  The BMI is above average  BMI counseling and education was provided to the patient  Nutrition recommendations include reducing portion sizes and decreasing overall calorie intake  Return in about 1 week (around 9/10/2019) for Next scheduled follow up-ear irrigation  Diagnoses and all orders for this visit:    Grief  -     LORazepam (ATIVAN) 0 5 mg tablet; Take 1 tablet (0 5 mg total) by mouth 2 (two) times a day as needed for anxiety          Subjective:      Patient ID: Ean Torrez is a 78 y o  male  Acute visit    75-year-old male presents accompanied by his wife tearful because his dog of 11 5 years  suddenly within the past week  This patient was very close to this animal, and has had a difficult time coping  He generally has a difficult time in the morning and usually at night when he is trying to go to sleep  His wife also is grieving but not to the extent the patient is  When discussing his situation there multiple times when he broke down in tears  He himself feels this is silly and that he should have better control but he is not able to at this time        ALLERGIES:  No Known Allergies    CURRENT MEDICATIONS:    Current Outpatient Medications:     amLODIPine (NORVASC) 2 5 mg tablet, Take 1 tablet (2 5 mg total) by mouth daily, Disp: 90 tablet, Rfl: 1    atenolol (TENORMIN) 50 mg tablet, Take 1 tablet (50 mg total) by mouth 2 (two) times a day, Disp: 60 tablet, Rfl:     atorvastatin (LIPITOR) 20 mg tablet, Take 1 tablet (20 mg total) by mouth daily at bedtime, Disp: 90 tablet, Rfl: 1    FLUoxetine (PROzac) 20 mg capsule, Take 1 capsule (20 mg total) by mouth daily, Disp: 90 capsule, Rfl: 1    LORazepam (ATIVAN) 0 5 mg tablet, Take 1 tablet (0 5 mg total) by mouth 2 (two) times a day as needed for anxiety, Disp: 10 tablet, Rfl: 0    rOPINIRole (REQUIP) 0 25 mg tablet, Take 1 tablet (0 25 mg total) by mouth daily at bedtime (Patient not taking: Reported on 9/3/2019), Disp: 30 tablet, Rfl: 3    ACTIVE PROBLEM LIST:  Patient Active Problem List   Diagnosis    Benign essential hypertension    Hyperlipidemia    Glaucoma    Anxiety disorder    Atrial fibrillation [facility])       PAST MEDICAL HISTORY:  Past Medical History:   Diagnosis Date    Retinal hemorrhage, left     last assessed: 9/2/2015       PAST SURGICAL HISTORY:  Past Surgical History:   Procedure Laterality Date    LITHOTRIPSY         FAMILY HISTORY:  History reviewed  No pertinent family history      SOCIAL HISTORY:  Social History     Socioeconomic History    Marital status: /Civil Union     Spouse name: Not on file    Number of children: Not on file    Years of education: Not on file    Highest education level: Not on file   Occupational History    Occupation: retired   Social Needs    Financial resource strain: Not on file    Food insecurity:     Worry: Not on file     Inability: Not on file   [employer] needs:     Medical: Not on file     Non-medical: Not on file   Tobacco Use    Smoking status: Former Smoker    Smokeless tobacco: Never Used   Substance and Sexual Activity    Alcohol use: Not on file    Drug use: No    Sexual activity: Not on file     Comment: denied: histroy of high risk sexual behavior   Lifestyle    Physical activity:     Days per week: Not on file     Minutes per session: Not on file    Stress: Not on file   Relationships    Social connections:     Talks on phone: Not on file     Gets together: Not on file     Attends [employer] service: Not on file     Active member of club or organization: Not on file     Attends meetings of clubs or organizations: Not on file     Relationship status: Not on file    Intimate partner violence:     Fear of current or ex partner: Not on file     Emotionally abused: Not on file     Physically abused: Not on file     Forced sexual activity: Not on file   Other Topics Concern    Not on file   Social History Narrative    Not on file       Review of Systems   Constitutional: Negative for activity change, chills, fatigue and fever  HENT: Negative for congestion  Eyes: Negative for discharge  Respiratory: Negative for cough, chest tightness and shortness of breath  Cardiovascular: Negative for chest pain, palpitations and leg swelling  Gastrointestinal: Negative for abdominal pain  Genitourinary: Negative for difficulty urinating  Musculoskeletal: Negative for arthralgias and myalgias  Skin: Negative for rash  Allergic/Immunologic: Negative for immunocompromised state  Neurological: Negative for dizziness, syncope, weakness, light-headedness and headaches  Hematological: Negative for adenopathy  Does not bruise/bleed easily  Psychiatric/Behavioral: Negative for dysphoric mood, sleep disturbance and suicidal ideas  The patient is nervous/anxious  Objective:  Vitals:    09/03/19 1521   BP: 140/90   BP Location: Right arm   Patient Position: Sitting   Cuff Size: Standard   Pulse: (!) 126   Resp: 16   SpO2: 98%   Weight: 82 1 kg (181 lb)   Height: 5' 8" (1 727 m)     Body mass index is 27 52 kg/m²  Physical Exam   Constitutional: He is oriented to person, place, and time  He appears well-developed and well-nourished  He appears distressed  Cardiovascular: Normal heart sounds  An irregularly irregular rhythm present  Tachycardia present  Pulmonary/Chest: Effort normal and breath sounds normal    Musculoskeletal: He exhibits no edema  Neurological: He is alert and oriented to person, place, and time  Skin: Skin is warm and dry  No rash noted  Psychiatric: He has a normal mood and affect  His behavior is normal    Tearful when discussing current situation  Nursing note and vitals reviewed  RESULTS:    No results found for this or any previous visit (from the past 1008 hour(s))  This note was created with voice recognition software  Phonic, grammatical and spelling errors may be present within the note as a result

## 2019-09-03 NOTE — PATIENT INSTRUCTIONS
Will make low-dose lorazepam available for anxiety  Patient will make follow-up appointment in 1 week for ear irrigation

## 2019-09-12 ENCOUNTER — PROCEDURE VISIT (OUTPATIENT)
Dept: INTERNAL MEDICINE CLINIC | Facility: CLINIC | Age: 79
End: 2019-09-12
Payer: MEDICARE

## 2019-09-12 VITALS
OXYGEN SATURATION: 98 % | HEIGHT: 68 IN | BODY MASS INDEX: 27.58 KG/M2 | HEART RATE: 70 BPM | RESPIRATION RATE: 16 BRPM | WEIGHT: 182 LBS | SYSTOLIC BLOOD PRESSURE: 124 MMHG | DIASTOLIC BLOOD PRESSURE: 78 MMHG

## 2019-09-12 DIAGNOSIS — H61.21 HEARING LOSS DUE TO CERUMEN IMPACTION, RIGHT: Primary | ICD-10-CM

## 2019-09-12 DIAGNOSIS — F41.9 ANXIETY DISORDER, UNSPECIFIED TYPE: ICD-10-CM

## 2019-09-12 PROCEDURE — 69209 REMOVE IMPACTED EAR WAX UNI: CPT | Performed by: PHYSICIAN ASSISTANT

## 2019-09-12 RX ORDER — BUSPIRONE HYDROCHLORIDE 5 MG/1
5 TABLET ORAL 2 TIMES DAILY
Qty: 60 TABLET | Refills: 1 | Status: SHIPPED | OUTPATIENT
Start: 2019-09-12 | End: 2019-11-08 | Stop reason: SDUPTHER

## 2019-09-12 NOTE — PROGRESS NOTES
Assessment/Plan:   Patient Instructions   Return for follow up visit  BMI Counseling: Body mass index is 27 67 kg/m²  Discussed the patient's BMI with him  The BMI is above normal  Nutrition recommendations include reducing portion sizes and decreasing overall calorie intake  Return for Next scheduled follow up  Diagnoses and all orders for this visit:    Hearing loss due to cerumen impaction, right  -     Ear cerumen removal; Future    Anxiety disorder, unspecified type          Subjective:      Patient ID: Rafy Nation is a 78 y o  male  Follow-up    Decreased hearing right ear secondary to cerumen impaction  Here for removal   Has used 1 week of Debrox        ALLERGIES:  No Known Allergies    CURRENT MEDICATIONS:    Current Outpatient Medications:     amLODIPine (NORVASC) 2 5 mg tablet, Take 1 tablet (2 5 mg total) by mouth daily, Disp: 90 tablet, Rfl: 1    atenolol (TENORMIN) 50 mg tablet, Take 1 tablet (50 mg total) by mouth 2 (two) times a day, Disp: 60 tablet, Rfl:     atorvastatin (LIPITOR) 20 mg tablet, Take 1 tablet (20 mg total) by mouth daily at bedtime, Disp: 90 tablet, Rfl: 1    FLUoxetine (PROzac) 20 mg capsule, Take 1 capsule (20 mg total) by mouth daily, Disp: 90 capsule, Rfl: 1    LORazepam (ATIVAN) 0 5 mg tablet, Take 1 tablet (0 5 mg total) by mouth 2 (two) times a day as needed for anxiety, Disp: 10 tablet, Rfl: 0    rOPINIRole (REQUIP) 0 25 mg tablet, Take 1 tablet (0 25 mg total) by mouth daily at bedtime (Patient not taking: Reported on 9/3/2019), Disp: 30 tablet, Rfl: 3    ACTIVE PROBLEM LIST:  Patient Active Problem List   Diagnosis    Benign essential hypertension    Hyperlipidemia    Glaucoma    Anxiety disorder    Atrial fibrillation [facility])       PAST MEDICAL HISTORY:  Past Medical History:   Diagnosis Date    Retinal hemorrhage, left     last assessed: 9/2/2015       PAST SURGICAL HISTORY:  Past Surgical History:   Procedure Laterality Date    LITHOTRIPSY         FAMILY HISTORY:  History reviewed  No pertinent family history  SOCIAL HISTORY:  Social History     Socioeconomic History    Marital status: /Civil Union     Spouse name: Not on file    Number of children: Not on file    Years of education: Not on file    Highest education level: Not on file   Occupational History    Occupation: retired   Social Needs    Financial resource strain: Not on file    Food insecurity:     Worry: Not on file     Inability: Not on file   [email or web address] needs:     Medical: Not on file     Non-medical: Not on file   Tobacco Use    Smoking status: Former Smoker    Smokeless tobacco: Never Used   Substance and Sexual Activity    Alcohol use: Not on file    Drug use: No    Sexual activity: Not on file     Comment: denied: histroy of high risk sexual behavior   Lifestyle    Physical activity:     Days per week: Not on file     Minutes per session: Not on file    Stress: Not on file   Relationships    Social connections:     Talks on phone: Not on file     Gets together: Not on file     Attends Pentecostalism service: Not on file     Active member of club or organization: Not on file     Attends meetings of clubs or organizations: Not on file     Relationship status: Not on file    Intimate partner violence:     Fear of current or ex partner: Not on file     Emotionally abused: Not on file     Physically abused: Not on file     Forced sexual activity: Not on file   Other Topics Concern    Not on file   Social History Narrative    Not on file       Review of Systems   HENT: Positive for hearing loss  Psychiatric/Behavioral: Negative for sleep disturbance and suicidal ideas  The patient is nervous/anxious            Objective:  Vitals:    09/12/19 0835   BP: 158/100   BP Location: Left arm   Patient Position: Sitting   Cuff Size: Adult   Pulse: 70   Resp: 16   SpO2: 98%   Weight: 82 6 kg (182 lb)   Height: 5' 8" (1 727 m)     Body mass index is 27 67 kg/m²  Physical Exam   Constitutional: He is oriented to person, place, and time  He appears well-developed and well-nourished  No distress  HENT:   Right tympanic membrane not visible due to cerumen impaction in external auditory canal   Cardiovascular: Normal rate  An irregularly irregular rhythm present  Pulmonary/Chest: Effort normal and breath sounds normal    Musculoskeletal: He exhibits no edema  Neurological: He is alert and oriented to person, place, and time  Skin: Skin is warm and dry  No rash noted  Psychiatric: He has a normal mood and affect  His behavior is normal          Ear cerumen removal  Date/Time: 9/12/2019 9:10 AM  Performed by: Delilah Lugo PA-C  Authorized by: Delilah Lugo PA-C     Patient location:  Clinic  Other Assisting Provider: No    Consent:     Consent obtained:  Verbal    Consent given by:  Patient    Risks discussed:  Bleeding, dizziness, incomplete removal, pain, TM perforation and infection    Alternatives discussed:  No treatment and referral  Universal protocol:     Procedure explained and questions answered to patient or proxy's satisfaction: yes      Patient identity confirmed:  Verbally with patient  Procedure details:     Local anesthetic:  None    Location:  R ear    Procedure type: irrigation only      Approach:  External and natural orifice    Equipment used:  Otoscope  Post-procedure details:     Complication:  None    Hearing quality:  Improved    Patient tolerance of procedure: Tolerated well, no immediate complications  Comments:      Postprocedure TM appears within normal limits      RESULTS:    No results found for this or any previous visit (from the past 1008 hour(s))  This note was created with voice recognition software  Phonic, grammatical and spelling errors may be present within the note as a result

## 2019-11-07 DIAGNOSIS — F41.9 ANXIETY: ICD-10-CM

## 2019-11-07 DIAGNOSIS — I10 ESSENTIAL HYPERTENSION: ICD-10-CM

## 2019-11-07 DIAGNOSIS — E78.2 MIXED HYPERLIPIDEMIA: ICD-10-CM

## 2019-11-07 RX ORDER — FLUOXETINE HYDROCHLORIDE 20 MG/1
CAPSULE ORAL
Qty: 90 CAPSULE | Refills: 1 | Status: SHIPPED | OUTPATIENT
Start: 2019-11-07 | End: 2019-12-12 | Stop reason: SDUPTHER

## 2019-11-07 RX ORDER — AMLODIPINE BESYLATE 2.5 MG/1
TABLET ORAL
Qty: 90 TABLET | Refills: 1 | Status: SHIPPED | OUTPATIENT
Start: 2019-11-07 | End: 2019-12-12 | Stop reason: SDUPTHER

## 2019-11-07 RX ORDER — ATORVASTATIN CALCIUM 20 MG/1
TABLET, FILM COATED ORAL
Qty: 90 TABLET | Refills: 1 | Status: SHIPPED | OUTPATIENT
Start: 2019-11-07 | End: 2019-12-12 | Stop reason: SDUPTHER

## 2019-11-08 DIAGNOSIS — F41.9 ANXIETY DISORDER, UNSPECIFIED TYPE: ICD-10-CM

## 2019-11-08 RX ORDER — BUSPIRONE HYDROCHLORIDE 5 MG/1
5 TABLET ORAL 2 TIMES DAILY
Qty: 90 TABLET | Refills: 1 | Status: SHIPPED | OUTPATIENT
Start: 2019-11-08 | End: 2021-07-06 | Stop reason: ALTCHOICE

## 2019-11-18 ENCOUNTER — APPOINTMENT (OUTPATIENT)
Dept: LAB | Facility: CLINIC | Age: 79
End: 2019-11-18
Payer: MEDICARE

## 2019-11-18 DIAGNOSIS — I10 BENIGN ESSENTIAL HYPERTENSION: ICD-10-CM

## 2019-11-18 LAB
ALBUMIN SERPL BCP-MCNC: 3.8 G/DL (ref 3.5–5)
ALP SERPL-CCNC: 95 U/L (ref 46–116)
ALT SERPL W P-5'-P-CCNC: 26 U/L (ref 12–78)
ANION GAP SERPL CALCULATED.3IONS-SCNC: 9 MMOL/L (ref 4–13)
AST SERPL W P-5'-P-CCNC: 16 U/L (ref 5–45)
BASOPHILS # BLD AUTO: 0.05 THOUSANDS/ΜL (ref 0–0.1)
BASOPHILS NFR BLD AUTO: 1 % (ref 0–1)
BILIRUB SERPL-MCNC: 0.72 MG/DL (ref 0.2–1)
BUN SERPL-MCNC: 28 MG/DL (ref 5–25)
CALCIUM SERPL-MCNC: 9.2 MG/DL (ref 8.3–10.1)
CHLORIDE SERPL-SCNC: 110 MMOL/L (ref 100–108)
CHOLEST SERPL-MCNC: 170 MG/DL (ref 50–200)
CO2 SERPL-SCNC: 25 MMOL/L (ref 21–32)
CREAT SERPL-MCNC: 1.2 MG/DL (ref 0.6–1.3)
EOSINOPHIL # BLD AUTO: 0.1 THOUSAND/ΜL (ref 0–0.61)
EOSINOPHIL NFR BLD AUTO: 2 % (ref 0–6)
ERYTHROCYTE [DISTWIDTH] IN BLOOD BY AUTOMATED COUNT: 13.5 % (ref 11.6–15.1)
GFR SERPL CREATININE-BSD FRML MDRD: 57 ML/MIN/1.73SQ M
GLUCOSE P FAST SERPL-MCNC: 98 MG/DL (ref 65–99)
HCT VFR BLD AUTO: 47.3 % (ref 36.5–49.3)
HDLC SERPL-MCNC: 56 MG/DL
HGB BLD-MCNC: 15.4 G/DL (ref 12–17)
IMM GRANULOCYTES # BLD AUTO: 0.02 THOUSAND/UL (ref 0–0.2)
IMM GRANULOCYTES NFR BLD AUTO: 0 % (ref 0–2)
LDLC SERPL CALC-MCNC: 92 MG/DL (ref 0–100)
LYMPHOCYTES # BLD AUTO: 2.31 THOUSANDS/ΜL (ref 0.6–4.47)
LYMPHOCYTES NFR BLD AUTO: 37 % (ref 14–44)
MCH RBC QN AUTO: 30 PG (ref 26.8–34.3)
MCHC RBC AUTO-ENTMCNC: 32.6 G/DL (ref 31.4–37.4)
MCV RBC AUTO: 92 FL (ref 82–98)
MONOCYTES # BLD AUTO: 0.65 THOUSAND/ΜL (ref 0.17–1.22)
MONOCYTES NFR BLD AUTO: 10 % (ref 4–12)
NEUTROPHILS # BLD AUTO: 3.11 THOUSANDS/ΜL (ref 1.85–7.62)
NEUTS SEG NFR BLD AUTO: 50 % (ref 43–75)
NONHDLC SERPL-MCNC: 114 MG/DL
NRBC BLD AUTO-RTO: 0 /100 WBCS
PLATELET # BLD AUTO: 183 THOUSANDS/UL (ref 149–390)
PMV BLD AUTO: 9.3 FL (ref 8.9–12.7)
POTASSIUM SERPL-SCNC: 4.3 MMOL/L (ref 3.5–5.3)
PROT SERPL-MCNC: 6.8 G/DL (ref 6.4–8.2)
RBC # BLD AUTO: 5.14 MILLION/UL (ref 3.88–5.62)
SODIUM SERPL-SCNC: 144 MMOL/L (ref 136–145)
TRIGL SERPL-MCNC: 111 MG/DL
TSH SERPL DL<=0.05 MIU/L-ACNC: 3.4 UIU/ML (ref 0.36–3.74)
WBC # BLD AUTO: 6.24 THOUSAND/UL (ref 4.31–10.16)

## 2019-11-18 PROCEDURE — 85025 COMPLETE CBC W/AUTO DIFF WBC: CPT

## 2019-11-18 PROCEDURE — 84443 ASSAY THYROID STIM HORMONE: CPT

## 2019-11-18 PROCEDURE — 80061 LIPID PANEL: CPT

## 2019-11-18 PROCEDURE — 36415 COLL VENOUS BLD VENIPUNCTURE: CPT

## 2019-11-18 PROCEDURE — 80053 COMPREHEN METABOLIC PANEL: CPT

## 2019-11-21 ENCOUNTER — OFFICE VISIT (OUTPATIENT)
Dept: INTERNAL MEDICINE CLINIC | Facility: CLINIC | Age: 79
End: 2019-11-21
Payer: MEDICARE

## 2019-11-21 VITALS
OXYGEN SATURATION: 98 % | WEIGHT: 183 LBS | BODY MASS INDEX: 27.74 KG/M2 | SYSTOLIC BLOOD PRESSURE: 134 MMHG | HEART RATE: 110 BPM | RESPIRATION RATE: 18 BRPM | HEIGHT: 68 IN | DIASTOLIC BLOOD PRESSURE: 86 MMHG

## 2019-11-21 DIAGNOSIS — I10 BENIGN ESSENTIAL HYPERTENSION: ICD-10-CM

## 2019-11-21 DIAGNOSIS — E78.2 MIXED HYPERLIPIDEMIA: ICD-10-CM

## 2019-11-21 DIAGNOSIS — Z00.00 MEDICARE ANNUAL WELLNESS VISIT, SUBSEQUENT: Primary | ICD-10-CM

## 2019-11-21 DIAGNOSIS — I48.91 ATRIAL FIBRILLATION, UNSPECIFIED TYPE (HCC): ICD-10-CM

## 2019-11-21 PROCEDURE — G0439 PPPS, SUBSEQ VISIT: HCPCS | Performed by: INTERNAL MEDICINE

## 2019-11-21 PROCEDURE — 99214 OFFICE O/P EST MOD 30 MIN: CPT | Performed by: INTERNAL MEDICINE

## 2019-11-21 RX ORDER — ATENOLOL 50 MG/1
75 TABLET ORAL 2 TIMES DAILY
Qty: 60 TABLET
Start: 2019-11-21

## 2019-11-21 NOTE — PROGRESS NOTES
Assessment and Plan:     Problem List Items Addressed This Visit     None           Preventive health issues were discussed with patient, and age appropriate screening tests were ordered as noted in patient's After Visit Summary  Personalized health advice and appropriate referrals for health education or preventive services given if needed, as noted in patient's After Visit Summary  History of Present Illness:     Patient presents for Welcome to Medicare visit  Patient Care Team:  Alfonzo Marion MD as PCP - General     Review of Systems:     Review of Systems   Respiratory: Negative for shortness of breath  Cardiovascular: Negative for chest pain  Gastrointestinal: Negative for abdominal pain  Problem List:     Patient Active Problem List   Diagnosis    Benign essential hypertension    Hyperlipidemia    Glaucoma    Anxiety disorder    Atrial fibrillation [facility])      Past Medical and Surgical History:     Past Medical History:   Diagnosis Date    Retinal hemorrhage, left     last assessed: 9/2/2015     Past Surgical History:   Procedure Laterality Date    LITHOTRIPSY        Family History:     No family history on file     Social History:     Social History     Socioeconomic History    Marital status: /Civil Union     Spouse name: Not on file    Number of children: Not on file    Years of education: Not on file    Highest education level: Not on file   Occupational History    Occupation: retired   Social Needs    Financial resource strain: Not on file    Food insecurity:     Worry: Not on file     Inability: Not on file   [employer] needs:     Medical: Not on file     Non-medical: Not on file   Tobacco Use    Smoking status: Former Smoker    Smokeless tobacco: Never Used   Substance and Sexual Activity    Alcohol use: Not on file    Drug use: No    Sexual activity: Not on file     Comment: denied: histroy of high risk sexual behavior   Lifestyle    Physical activity: Days per week: Not on file     Minutes per session: Not on file    Stress: Not on file   Relationships    Social connections:     Talks on phone: Not on file     Gets together: Not on file     Attends Taoism service: Not on file     Active member of club or organization: Not on file     Attends meetings of clubs or organizations: Not on file     Relationship status: Not on file    Intimate partner violence:     Fear of current or ex partner: Not on file     Emotionally abused: Not on file     Physically abused: Not on file     Forced sexual activity: Not on file   Other Topics Concern    Not on file   Social History Narrative    Not on file      Medications and Allergies:     Current Outpatient Medications   Medication Sig Dispense Refill    amLODIPine (NORVASC) 2 5 mg tablet TAKE 1 TABLET BY MOUTH  DAILY 90 tablet 1    atenolol (TENORMIN) 50 mg tablet Take 1 tablet (50 mg total) by mouth 2 (two) times a day 60 tablet     atorvastatin (LIPITOR) 20 mg tablet TAKE 1 TABLET BY MOUTH  DAILY AT BEDTIME 90 tablet 1    busPIRone (BUSPAR) 5 mg tablet Take 1 tablet (5 mg total) by mouth 2 (two) times a day 90 tablet 1    FLUoxetine (PROzac) 20 mg capsule TAKE 1 CAPSULE BY MOUTH  DAILY 90 capsule 1    LORazepam (ATIVAN) 0 5 mg tablet Take 1 tablet (0 5 mg total) by mouth 2 (two) times a day as needed for anxiety 10 tablet 0    rOPINIRole (REQUIP) 0 25 mg tablet Take 1 tablet (0 25 mg total) by mouth daily at bedtime (Patient not taking: Reported on 9/3/2019) 30 tablet 3     No current facility-administered medications for this visit  No Known Allergies   Immunizations:     Immunization History   Administered Date(s) Administered    Tdap 10/16/2014      Health Maintenance: There are no preventive care reminders to display for this patient        Topic Date Due    Pneumococcal Vaccine: 65+ Years (1 of 2 - PCV13) 05/19/2005    Influenza Vaccine  07/01/2019      Medicare Screening Tests and Risk Assessments:     Karla Moya is here for his Subsequent Wellness visit  Last Medicare Wellness visit information reviewed, patient interviewed, no change since last AWV  Health Risk Assessment:   Patient rates overall health as good  Patient feels that their physical health rating is slightly better  Eyesight was rated as slightly worse  Hearing was rated as slightly worse  Patient feels that their emotional and mental health rating is slightly worse  Pain experienced in the last 7 days has been some  Patient's pain rating has been 3/10  Patient states that he has experienced no weight loss or gain in last 6 months  Depression Screening:   PHQ-2 Score: 0      Fall Risk Screening: In the past year, patient has experienced: history of falling in past year    Number of falls: 1  Injured during fall?: No    Feels unsteady when standing or walking?: No    Worried about falling?: No      Home Safety:  Patient does not have trouble with stairs inside or outside of their home  Patient has no working smoke alarms and has no working carbon monoxide detector  Home safety hazards include: none  Nutrition:   Current diet is Regular and Limited junk food  Medications:   Patient is currently taking over-the-counter supplements  OTC medications include: see medication list  Patient is able to manage medications  Activities of Daily Living (ADLs)/Instrumental Activities of Daily Living (IADLs):   Walk and transfer into and out of bed and chair?: Yes  Dress and groom yourself?: Yes    Bathe or shower yourself?: Yes    Feed yourself?  Yes  Do your laundry/housekeeping?: Yes  Manage your money, pay your bills and track your expenses?: Yes  Make your own meals?: Yes    Do your own shopping?: Yes    Advance Care Planning:   Living will: No    Advanced directive counseling given: Yes      Cognitive Screening:   Provider or family/friend/caregiver concerned regarding cognition?: No    PREVENTIVE SCREENINGS Cardiovascular Screening:    General: Screening Not Indicated and History Lipid Disorder      Diabetes Screening:     General: Screening Current      Prostate Cancer Screening:    General: Screening Not Indicated      Osteoporosis Screening:    General: Screening Not Indicated      Abdominal Aortic Aneurysm (AAA) Screening:    Risk factors include: tobacco use        General: Screening Not Indicated      Lung Cancer Screening:     General: Screening Not Indicated      Hepatitis C Screening:    General: Screening Not Indicated    No exam data present     Physical Exam:     There were no vitals taken for this visit      Physical Exam    Neo Matos MD

## 2019-11-21 NOTE — PROGRESS NOTES
Assessment/Plan:     Chronic problems are stable  Continue present medications  Continue diet and exercise  Ordered labs for next visit  I also told him to consider whether he wants to increase his fluoxetine or BuSpar  Quality Measures:       Return in about 6 months (around 5/21/2020)  No problem-specific Assessment & Plan notes found for this encounter  Diagnoses and all orders for this visit:    Benign essential hypertension  -     atenolol (TENORMIN) 50 mg tablet; Take 1 5 tablets (75 mg total) by mouth 2 (two) times a day  -     CBC and differential; Future  -     Comprehensive metabolic panel; Future  -     Lipid panel; Future    Atrial fibrillation, unspecified type ([facility] [address] )    Mixed hyperlipidemia          Subjective:      Patient ID: Sheryl Newby is a 78 y o  male  Patient comes in today for routine follow-up and Medicare wellness  His blood pressure is controlled  Cholesterol is controlled  Still seeing Cardiology for his atrial fibrillation  Heart rate control remains variable  He is taking his medicine as directed  He admits that the BuSpar did help somewhat  But he is still having trouble dealing with the passing of his dog  Still getting upset at night        ALLERGIES:  No Known Allergies    CURRENT MEDICATIONS:    Current Outpatient Medications:     amLODIPine (NORVASC) 2 5 mg tablet, TAKE 1 TABLET BY MOUTH  DAILY, Disp: 90 tablet, Rfl: 1    atenolol (TENORMIN) 50 mg tablet, Take 1 5 tablets (75 mg total) by mouth 2 (two) times a day, Disp: 60 tablet, Rfl:     atorvastatin (LIPITOR) 20 mg tablet, TAKE 1 TABLET BY MOUTH  DAILY AT BEDTIME, Disp: 90 tablet, Rfl: 1    busPIRone (BUSPAR) 5 mg tablet, Take 1 tablet (5 mg total) by mouth 2 (two) times a day, Disp: 90 tablet, Rfl: 1    FLUoxetine (PROzac) 20 mg capsule, TAKE 1 CAPSULE BY MOUTH  DAILY, Disp: 90 capsule, Rfl: 1    ACTIVE PROBLEM LIST:  Patient Active Problem List   Diagnosis    Benign essential hypertension  Hyperlipidemia    Glaucoma    Anxiety disorder    Atrial fibrillation [facility])       PAST MEDICAL HISTORY:  Past Medical History:   Diagnosis Date    Retinal hemorrhage, left     last assessed: 9/2/2015       PAST SURGICAL HISTORY:  Past Surgical History:   Procedure Laterality Date    LITHOTRIPSY         FAMILY HISTORY:  History reviewed  No pertinent family history  SOCIAL HISTORY:  Social History     Socioeconomic History    Marital status: /Civil Union     Spouse name: Not on file    Number of children: Not on file    Years of education: Not on file    Highest education level: Not on file   Occupational History    Occupation: retired   Social Needs    Financial resource strain: Not on file    Food insecurity:     Worry: Not on file     Inability: Not on file   [employer] needs:     Medical: Not on file     Non-medical: Not on file   Tobacco Use    Smoking status: Former Smoker    Smokeless tobacco: Never Used   Substance and Sexual Activity    Alcohol use: Not on file    Drug use: No    Sexual activity: Not on file     Comment: denied: histroy of high risk sexual behavior   Lifestyle    Physical activity:     Days per week: Not on file     Minutes per session: Not on file    Stress: Not on file   Relationships    Social connections:     Talks on phone: Not on file     Gets together: Not on file     Attends Rastafarian service: Not on file     Active member of club or organization: Not on file     Attends meetings of clubs or organizations: Not on file     Relationship status: Not on file    Intimate partner violence:     Fear of current or ex partner: Not on file     Emotionally abused: Not on file     Physically abused: Not on file     Forced sexual activity: Not on file   Other Topics Concern    Not on file   Social History Narrative    Not on file       Review of Systems   Respiratory: Negative for shortness of breath  Cardiovascular: Negative for chest pain  Gastrointestinal: Negative for abdominal pain  Objective:  Vitals:    11/21/19 1039   BP: 134/86   BP Location: Left arm   Patient Position: Sitting   Cuff Size: Adult   Pulse: (!) 110   Resp: 18   SpO2: 98%   Weight: 83 kg (183 lb)   Height: 5' 8" (1 727 m)     Body mass index is 27 83 kg/m²  Physical Exam   Constitutional: He is oriented to person, place, and time  He appears well-developed and well-nourished  Cardiovascular: Normal rate, regular rhythm and normal heart sounds  Pulmonary/Chest: Effort normal and breath sounds normal    Abdominal: Soft  There is no tenderness  Musculoskeletal: He exhibits no edema  Neurological: He is alert and oriented to person, place, and time  Nursing note and vitals reviewed          RESULTS:    Recent Results (from the past 1008 hour(s))   CBC and differential    Collection Time: 11/18/19 10:34 AM   Result Value Ref Range    WBC 6 24 4 31 - 10 16 Thousand/uL    RBC 5 14 3 88 - 5 62 Million/uL    Hemoglobin 15 4 12 0 - 17 0 g/dL    Hematocrit 47 3 36 5 - 49 3 %    MCV 92 82 - 98 fL    MCH 30 0 26 8 - 34 3 pg    MCHC 32 6 31 4 - 37 4 g/dL    RDW 13 5 11 6 - 15 1 %    MPV 9 3 8 9 - 12 7 fL    Platelets 722 619 - 582 Thousands/uL    nRBC 0 /100 WBCs    Neutrophils Relative 50 43 - 75 %    Immat GRANS % 0 0 - 2 %    Lymphocytes Relative 37 14 - 44 %    Monocytes Relative 10 4 - 12 %    Eosinophils Relative 2 0 - 6 %    Basophils Relative 1 0 - 1 %    Neutrophils Absolute 3 11 1 85 - 7 62 Thousands/µL    Immature Grans Absolute 0 02 0 00 - 0 20 Thousand/uL    Lymphocytes Absolute 2 31 0 60 - 4 47 Thousands/µL    Monocytes Absolute 0 65 0 17 - 1 22 Thousand/µL    Eosinophils Absolute 0 10 0 00 - 0 61 Thousand/µL    Basophils Absolute 0 05 0 00 - 0 10 Thousands/µL   Comprehensive metabolic panel    Collection Time: 11/18/19 10:34 AM   Result Value Ref Range    Sodium 144 136 - 145 mmol/L    Potassium 4 3 3 5 - 5 3 mmol/L    Chloride 110 (H) 100 - 108 mmol/L CO2 25 21 - 32 mmol/L    ANION GAP 9 4 - 13 mmol/L    BUN 28 (H) 5 - 25 mg/dL    Creatinine 1 20 0 60 - 1 30 mg/dL    Glucose, Fasting 98 65 - 99 mg/dL    Calcium 9 2 8 3 - 10 1 mg/dL    AST 16 5 - 45 U/L    ALT 26 12 - 78 U/L    Alkaline Phosphatase 95 46 - 116 U/L    Total Protein 6 8 6 4 - 8 2 g/dL    Albumin 3 8 3 5 - 5 0 g/dL    Total Bilirubin 0 72 0 20 - 1 00 mg/dL    eGFR 57 ml/min/1 73sq m   Lipid panel    Collection Time: 11/18/19 10:34 AM   Result Value Ref Range    Cholesterol 170 50 - 200 mg/dL    Triglycerides 111 <=150 mg/dL    HDL, Direct 56 >=40 mg/dL    LDL Calculated 92 0 - 100 mg/dL    Non-HDL-Chol (CHOL-HDL) 114 mg/dl   TSH, 3rd generation    Collection Time: 11/18/19 10:34 AM   Result Value Ref Range    TSH 3RD GENERATON 3 400 0 358 - 3 740 uIU/mL       This note was created with voice recognition software  Phonic, grammatical and spelling errors may be present within the note as a result

## 2019-11-21 NOTE — PATIENT INSTRUCTIONS
Medicare Preventive Visit Patient Instructions  Thank you for completing your Welcome to Medicare Visit or Medicare Annual Wellness Visit today  Your next wellness visit will be due in one year (11/21/2020)  The screening/preventive services that you may require over the next 5-10 years are detailed below  Some tests may not apply to you based off risk factors and/or age  Screening tests ordered at today's visit but not completed yet may show as past due  Also, please note that scanned in results may not display below  Preventive Screenings:  Service Recommendations Previous Testing/Comments   Colorectal Cancer Screening  · Colonoscopy    · Fecal Occult Blood Test (FOBT)/Fecal Immunochemical Test (FIT)  · Fecal DNA/Cologuard Test  · Flexible Sigmoidoscopy Age: 54-65 years old   Colonoscopy: every 10 years (May be performed more frequently if at higher risk)  OR  FOBT/FIT: every 1 year  OR  Cologuard: every 3 years  OR  Sigmoidoscopy: every 5 years  Screening may be recommended earlier than age 48 if at higher risk for colorectal cancer  Also, an individualized decision between you and your healthcare provider will decide whether screening between the ages of 74-80 would be appropriate   Colonoscopy: Not on file  FOBT/FIT: Not on file  Cologuard: Not on file  Sigmoidoscopy: Not on file         Prostate Cancer Screening Individualized decision between patient and health care provider in men between ages of 53-78   Medicare will cover every 12 months beginning on the day after your 50th birthday PSA: 0 7 ng/mL     Screening Not Indicated     Hepatitis C Screening Once for adults born between [facility] and [place]  More frequently in patients at high risk for Hepatitis C Hep C Antibody: Not on file       Diabetes Screening 1-2 times per year if you're at risk for diabetes or have pre-diabetes Fasting glucose: 98 mg/dL   A1C: 5 6    Screening Current   Cholesterol Screening Once every 5 years if you don't have a lipid disorder  May order more often based on risk factors  Lipid panel: 11/18/2019    Screening Not Indicated  History Lipid Disorder      Other Preventive Screenings Covered by Medicare:  1  Abdominal Aortic Aneurysm (AAA) Screening: covered once if your at risk  You're considered to be at risk if you have a family history of AAA or a male between the age of 73-68 who smoking at least 100 cigarettes in your lifetime  2  Lung Cancer Screening: covers low dose CT scan once per year if you meet all of the following conditions: (1) Age 50-69; (2) No signs or symptoms of lung cancer; (3) Current smoker or have quit smoking within the last 15 years; (4) You have a tobacco smoking history of at least 30 pack years (packs per day x number of years you smoked); (5) You get a written order from a healthcare provider  3  Glaucoma Screening: covered annually if you're considered high risk: (1) You have diabetes OR (2) Family history of glaucoma OR (3)  aged 48 and older OR (3)  American aged 72 and older  3  Osteoporosis Screening: covered every 2 years if you meet one of the following conditions: (1) Have a vertebral abnormality; (2) On glucocorticoid therapy for more than 3 months; (3) Have primary hyperparathyroidism; (4) On osteoporosis medications and need to assess response to drug therapy  5  HIV Screening: covered annually if you're between the age of 12-76  Also covered annually if you are younger than 13 and older than 72 with risk factors for HIV infection  For pregnant patients, it is covered up to 3 times per pregnancy      Immunizations:  Immunization Recommendations   Influenza Vaccine Annual influenza vaccination during flu season is recommended for all persons aged >= 6 months who do not have contraindications   Pneumococcal Vaccine (Prevnar and Pneumovax)  * Prevnar = PCV13  * Pneumovax = PPSV23 Adults 25-60 years old: 1-3 doses may be recommended based on certain risk factors  Adults 65+ years old: Prevnar (PCV13) vaccine recommended followed by Pneumovax (PPSV23) vaccine  If already received PPSV23 since turning 65, then PCV13 recommended at least one year after PPSV23 dose  Hepatitis B Vaccine 3 dose series if at intermediate or high risk (ex: diabetes, end stage renal disease, liver disease)   Tetanus (Td) Vaccine - COST NOT COVERED BY MEDICARE PART B Following completion of primary series, a booster dose should be given every 10 years to maintain immunity against tetanus  Td may also be given as tetanus wound prophylaxis  Tdap Vaccine - COST NOT COVERED BY MEDICARE PART B Recommended at least once for all adults  For pregnant patients, recommended with each pregnancy  Shingles Vaccine (Shingrix) - COST NOT COVERED BY MEDICARE PART B  2 shot series recommended in those aged 48 and above     Health Maintenance Due:  There are no preventive care reminders to display for this patient  Immunizations Due:      Topic Date Due    Pneumococcal Vaccine: 65+ Years (1 of 2 - PCV13) 05/19/2005    Influenza Vaccine  07/01/2019     Advance Directives   What are advance directives? Advance directives are legal documents that state your wishes and plans for medical care  These plans are made ahead of time in case you lose your ability to make decisions for yourself  Advance directives can apply to any medical decision, such as the treatments you want, and if you want to donate organs  What are the types of advance directives? There are many types of advance directives, and each state has rules about how to use them  You may choose a combination of any of the following:  · Living will: This is a written record of the treatment you want  You can also choose which treatments you do not want, which to limit, and which to stop at a certain time  This includes surgery, medicine, IV fluid, and tube feedings  · Durable power of  for healthcare [facility]):   This is a written record that states who you want to make healthcare choices for you when you are unable to make them for yourself  This person, called a proxy, is usually a family member or a friend  You may choose more than 1 proxy  · Do not resuscitate (DNR) order:  A DNR order is used in case your heart stops beating or you stop breathing  It is a request not to have certain forms of treatment, such as CPR  A DNR order may be included in other types of advance directives  · Medical directive: This covers the care that you want if you are in a coma, near death, or unable to make decisions for yourself  You can list the treatments you want for each condition  Treatment may include pain medicine, surgery, blood transfusions, dialysis, IV or tube feedings, and a ventilator (breathing machine)  · Values history: This document has questions about your views, beliefs, and how you feel and think about life  This information can help others choose the care that you would choose  Why are advance directives important? An advance directive helps you control your care  Although spoken wishes may be used, it is better to have your wishes written down  Spoken wishes can be misunderstood, or not followed  Treatments may be given even if you do not want them  An advance directive may make it easier for your family to make difficult choices about your care  Fall Prevention    Fall prevention  includes ways to make your home and other areas safer  It also includes ways you can move more carefully to prevent a fall  Health conditions that cause changes in your blood pressure, vision, or muscle strength and coordination may increase your risk for falls  Medicines may also increase your risk for falls if they make you dizzy, weak, or sleepy  Fall prevention tips:   · Stand or sit up slowly  · Use assistive devices as directed  · Wear shoes that fit well and have soles that   · Wear a personal alarm  · Stay active      · Manage your medical conditions  Home Safety Tips:  · Add items to prevent falls in the bathroom  · Keep paths clear  · Install bright lights in your home  · Keep items you use often on shelves within reach  · Paint or place reflective tape on the edges of your stairs  Weight Management   Why it is important to manage your weight:  Being overweight increases your risk of health conditions such as heart disease, high blood pressure, type 2 diabetes, and certain types of cancer  It can also increase your risk for osteoarthritis, sleep apnea, and other respiratory problems  Aim for a slow, steady weight loss  Even a small amount of weight loss can lower your risk of health problems  How to lose weight safely:  A safe and healthy way to lose weight is to eat fewer calories and get regular exercise  You can lose up about 1 pound a week by decreasing the number of calories you eat by 500 calories each day  Healthy meal plan for weight management:  A healthy meal plan includes a variety of foods, contains fewer calories, and helps you stay healthy  A healthy meal plan includes the following:  · Eat whole-grain foods more often  A healthy meal plan should contain fiber  Fiber is the part of grains, fruits, and vegetables that is not broken down by your body  Whole-grain foods are healthy and provide extra fiber in your diet  Some examples of whole-grain foods are whole-wheat breads and pastas, oatmeal, brown rice, and bulgur  · Eat a variety of vegetables every day  Include dark, leafy greens such as spinach, kale, yeny greens, and mustard greens  Eat yellow and orange vegetables such as carrots, sweet potatoes, and winter squash  · Eat a variety of fruits every day  Choose fresh or canned fruit (canned in its own juice or light syrup) instead of juice  Fruit juice has very little or no fiber  · Eat low-fat dairy foods  Drink fat-free (skim) milk or 1% milk  Eat fat-free yogurt and low-fat cottage cheese   Try low-fat cheeses such as mozzarella and other reduced-fat cheeses  · Choose meat and other protein foods that are low in fat  Choose beans or other legumes such as split peas or lentils  Choose fish, skinless poultry (chicken or turkey), or lean cuts of red meat (beef or pork)  Before you cook meat or poultry, cut off any visible fat  · Use less fat and oil  Try baking foods instead of frying them  Add less fat, such as margarine, sour cream, regular salad dressing and mayonnaise to foods  Eat fewer high-fat foods  Some examples of high-fat foods include french fries, doughnuts, ice cream, and cakes  · Eat fewer sweets  Limit foods and drinks that are high in sugar  This includes candy, cookies, regular soda, and sweetened drinks  Exercise:  Exercise at least 30 minutes per day on most days of the week  Some examples of exercise include walking, biking, dancing, and swimming  You can also fit in more physical activity by taking the stairs instead of the elevator or parking farther away from stores  Ask your healthcare provider about the best exercise plan for you  © Copyright [employer] 2018 Information is for End User's use only and may not be sold, redistributed or otherwise used for commercial purposes  All illustrations and images included in CareNotes® are the copyrighted property of A [employer] A M , Inc  or [facility] Preventive Visit Patient Instructions  Thank you for completing your Welcome to Medicare Visit or Medicare Annual Wellness Visit today  Your next wellness visit will be due in one year (11/21/2020)  The screening/preventive services that you may require over the next 5-10 years are detailed below  Some tests may not apply to you based off risk factors and/or age  Screening tests ordered at today's visit but not completed yet may show as past due  Also, please note that scanned in results may not display below    Preventive Screenings:  Service Recommendations Previous Testing/Comments   Colorectal Cancer Screening  · Colonoscopy    · Fecal Occult Blood Test (FOBT)/Fecal Immunochemical Test (FIT)  · Fecal DNA/Cologuard Test  · Flexible Sigmoidoscopy Age: 54-65 years old   Colonoscopy: every 10 years (May be performed more frequently if at higher risk)  OR  FOBT/FIT: every 1 year  OR  Cologuard: every 3 years  OR  Sigmoidoscopy: every 5 years  Screening may be recommended earlier than age 48 if at higher risk for colorectal cancer  Also, an individualized decision between you and your healthcare provider will decide whether screening between the ages of 74-80 would be appropriate  Colonoscopy: Not on file  FOBT/FIT: Not on file  Cologuard: Not on file  Sigmoidoscopy: Not on file         Prostate Cancer Screening Individualized decision between patient and health care provider in men between ages of 53-78   Medicare will cover every 12 months beginning on the day after your 50th birthday PSA: 0 7 ng/mL     Screening Not Indicated     Hepatitis C Screening Once for adults born between 1945 and 1965  More frequently in patients at high risk for Hepatitis C Hep C Antibody: Not on file    Screening Not Indicated   Diabetes Screening 1-2 times per year if you're at risk for diabetes or have pre-diabetes Fasting glucose: 98 mg/dL   A1C: 5 6    Screening Current   Cholesterol Screening Once every 5 years if you don't have a lipid disorder  May order more often based on risk factors  Lipid panel: 11/18/2019    Screening Not Indicated  History Lipid Disorder      Other Preventive Screenings Covered by Medicare:  6  Abdominal Aortic Aneurysm (AAA) Screening: covered once if your at risk  You're considered to be at risk if you have a family history of AAA or a male between the age of 73-68 who smoking at least 100 cigarettes in your lifetime    7  Lung Cancer Screening: covers low dose CT scan once per year if you meet all of the following conditions: (1) Age 50-69; (2) No signs or symptoms of lung cancer; (3) Current smoker or have quit smoking within the last 15 years; (4) You have a tobacco smoking history of at least 30 pack years (packs per day x number of years you smoked); (5) You get a written order from a healthcare provider  8  Glaucoma Screening: covered annually if you're considered high risk: (1) You have diabetes OR (2) Family history of glaucoma OR (3)  aged 48 and older OR (3)  American aged 72 and older  5  Osteoporosis Screening: covered every 2 years if you meet one of the following conditions: (1) Have a vertebral abnormality; (2) On glucocorticoid therapy for more than 3 months; (3) Have primary hyperparathyroidism; (4) On osteoporosis medications and need to assess response to drug therapy  10  HIV Screening: covered annually if you're between the age of 12-76  Also covered annually if you are younger than 13 and older than 72 with risk factors for HIV infection  For pregnant patients, it is covered up to 3 times per pregnancy  Immunizations:  Immunization Recommendations   Influenza Vaccine Annual influenza vaccination during flu season is recommended for all persons aged >= 6 months who do not have contraindications   Pneumococcal Vaccine (Prevnar and Pneumovax)  * Prevnar = PCV13  * Pneumovax = PPSV23 Adults 25-60 years old: 1-3 doses may be recommended based on certain risk factors  Adults 72 years old: Prevnar (PCV13) vaccine recommended followed by Pneumovax (PPSV23) vaccine  If already received PPSV23 since turning 65, then PCV13 recommended at least one year after PPSV23 dose  Hepatitis B Vaccine 3 dose series if at intermediate or high risk (ex: diabetes, end stage renal disease, liver disease)   Tetanus (Td) Vaccine - COST NOT COVERED BY MEDICARE PART B Following completion of primary series, a booster dose should be given every 10 years to maintain immunity against tetanus  Td may also be given as tetanus wound prophylaxis     Tdap Vaccine - COST NOT COVERED BY MEDICARE PART B Recommended at least once for all adults  For pregnant patients, recommended with each pregnancy  Shingles Vaccine (Shingrix) - COST NOT COVERED BY MEDICARE PART B  2 shot series recommended in those aged 48 and above     Health Maintenance Due:  There are no preventive care reminders to display for this patient  Immunizations Due:      Topic Date Due    Pneumococcal Vaccine: 65+ Years (1 of 2 - PCV13) 05/19/2005     Advance Directives   What are advance directives? Advance directives are legal documents that state your wishes and plans for medical care  These plans are made ahead of time in case you lose your ability to make decisions for yourself  Advance directives can apply to any medical decision, such as the treatments you want, and if you want to donate organs  What are the types of advance directives? There are many types of advance directives, and each state has rules about how to use them  You may choose a combination of any of the following:  · Living will: This is a written record of the treatment you want  You can also choose which treatments you do not want, which to limit, and which to stop at a certain time  This includes surgery, medicine, IV fluid, and tube feedings  · Durable power of  for healthcare [place]): This is a written record that states who you want to make healthcare choices for you when you are unable to make them for yourself  This person, called a proxy, is usually a family member or a friend  You may choose more than 1 proxy  · Do not resuscitate (DNR) order:  A DNR order is used in case your heart stops beating or you stop breathing  It is a request not to have certain forms of treatment, such as CPR  A DNR order may be included in other types of advance directives  · Medical directive: This covers the care that you want if you are in a coma, near death, or unable to make decisions for yourself   You can list the treatments you want for each condition  Treatment may include pain medicine, surgery, blood transfusions, dialysis, IV or tube feedings, and a ventilator (breathing machine)  · Values history: This document has questions about your views, beliefs, and how you feel and think about life  This information can help others choose the care that you would choose  Why are advance directives important? An advance directive helps you control your care  Although spoken wishes may be used, it is better to have your wishes written down  Spoken wishes can be misunderstood, or not followed  Treatments may be given even if you do not want them  An advance directive may make it easier for your family to make difficult choices about your care  Fall Prevention    Fall prevention  includes ways to make your home and other areas safer  It also includes ways you can move more carefully to prevent a fall  Health conditions that cause changes in your blood pressure, vision, or muscle strength and coordination may increase your risk for falls  Medicines may also increase your risk for falls if they make you dizzy, weak, or sleepy  Fall prevention tips:   · Stand or sit up slowly  · Use assistive devices as directed  · Wear shoes that fit well and have soles that   · Wear a personal alarm  · Stay active  · Manage your medical conditions  Home Safety Tips:  · Add items to prevent falls in the bathroom  · Keep paths clear  · Install bright lights in your home  · Keep items you use often on shelves within reach  · Paint or place reflective tape on the edges of your stairs  Weight Management   Why it is important to manage your weight:  Being overweight increases your risk of health conditions such as heart disease, high blood pressure, type 2 diabetes, and certain types of cancer  It can also increase your risk for osteoarthritis, sleep apnea, and other respiratory problems  Aim for a slow, steady weight loss  Even a small amount of weight loss can lower your risk of health problems  How to lose weight safely:  A safe and healthy way to lose weight is to eat fewer calories and get regular exercise  You can lose up about 1 pound a week by decreasing the number of calories you eat by 500 calories each day  Healthy meal plan for weight management:  A healthy meal plan includes a variety of foods, contains fewer calories, and helps you stay healthy  A healthy meal plan includes the following:  · Eat whole-grain foods more often  A healthy meal plan should contain fiber  Fiber is the part of grains, fruits, and vegetables that is not broken down by your body  Whole-grain foods are healthy and provide extra fiber in your diet  Some examples of whole-grain foods are whole-wheat breads and pastas, oatmeal, brown rice, and bulgur  · Eat a variety of vegetables every day  Include dark, leafy greens such as spinach, kale, yeny greens, and mustard greens  Eat yellow and orange vegetables such as carrots, sweet potatoes, and winter squash  · Eat a variety of fruits every day  Choose fresh or canned fruit (canned in its own juice or light syrup) instead of juice  Fruit juice has very little or no fiber  · Eat low-fat dairy foods  Drink fat-free (skim) milk or 1% milk  Eat fat-free yogurt and low-fat cottage cheese  Try low-fat cheeses such as mozzarella and other reduced-fat cheeses  · Choose meat and other protein foods that are low in fat  Choose beans or other legumes such as split peas or lentils  Choose fish, skinless poultry (chicken or turkey), or lean cuts of red meat (beef or pork)  Before you cook meat or poultry, cut off any visible fat  · Use less fat and oil  Try baking foods instead of frying them  Add less fat, such as margarine, sour cream, regular salad dressing and mayonnaise to foods  Eat fewer high-fat foods   Some examples of high-fat foods include french fries, doughnuts, ice cream, and cakes   · Eat fewer sweets  Limit foods and drinks that are high in sugar  This includes candy, cookies, regular soda, and sweetened drinks  Exercise:  Exercise at least 30 minutes per day on most days of the week  Some examples of exercise include walking, biking, dancing, and swimming  You can also fit in more physical activity by taking the stairs instead of the elevator or parking farther away from stores  Ask your healthcare provider about the best exercise plan for you  © Copyright [employer] 2018 Information is for End User's use only and may not be sold, redistributed or otherwise used for commercial purposes   All illustrations and images included in CareNotes® are the copyrighted property of [employer]  or [address] [place] [address]

## 2019-12-12 DIAGNOSIS — E78.2 MIXED HYPERLIPIDEMIA: ICD-10-CM

## 2019-12-12 DIAGNOSIS — I10 ESSENTIAL HYPERTENSION: ICD-10-CM

## 2019-12-12 DIAGNOSIS — F41.9 ANXIETY: ICD-10-CM

## 2019-12-12 RX ORDER — FLUOXETINE HYDROCHLORIDE 20 MG/1
20 CAPSULE ORAL DAILY
Qty: 90 CAPSULE | Refills: 3 | Status: SHIPPED | OUTPATIENT
Start: 2019-12-12 | End: 2020-10-19

## 2019-12-12 RX ORDER — AMLODIPINE BESYLATE 2.5 MG/1
2.5 TABLET ORAL DAILY
Qty: 90 TABLET | Refills: 3 | Status: SHIPPED | OUTPATIENT
Start: 2019-12-12 | End: 2020-10-19

## 2019-12-12 RX ORDER — ATORVASTATIN CALCIUM 20 MG/1
20 TABLET, FILM COATED ORAL
Qty: 90 TABLET | Refills: 3 | Status: SHIPPED | OUTPATIENT
Start: 2019-12-12 | End: 2020-10-19

## 2020-10-18 DIAGNOSIS — I10 ESSENTIAL HYPERTENSION: ICD-10-CM

## 2020-10-18 DIAGNOSIS — E78.2 MIXED HYPERLIPIDEMIA: ICD-10-CM

## 2020-10-18 DIAGNOSIS — F41.9 ANXIETY: ICD-10-CM

## 2020-10-19 RX ORDER — FLUOXETINE HYDROCHLORIDE 20 MG/1
CAPSULE ORAL
Qty: 90 CAPSULE | Refills: 3 | Status: SHIPPED | OUTPATIENT
Start: 2020-10-19 | End: 2021-09-07

## 2020-10-19 RX ORDER — AMLODIPINE BESYLATE 2.5 MG/1
TABLET ORAL
Qty: 90 TABLET | Refills: 3 | Status: SHIPPED | OUTPATIENT
Start: 2020-10-19 | End: 2021-09-07

## 2020-10-19 RX ORDER — ATORVASTATIN CALCIUM 20 MG/1
TABLET, FILM COATED ORAL
Qty: 90 TABLET | Refills: 3 | Status: SHIPPED | OUTPATIENT
Start: 2020-10-19 | End: 2021-09-07

## 2020-12-04 ENCOUNTER — TELEPHONE (OUTPATIENT)
Dept: INTERNAL MEDICINE CLINIC | Facility: CLINIC | Age: 80
End: 2020-12-04

## 2020-12-07 ENCOUNTER — OFFICE VISIT (OUTPATIENT)
Dept: INTERNAL MEDICINE CLINIC | Facility: CLINIC | Age: 80
End: 2020-12-07
Payer: MEDICARE

## 2020-12-07 VITALS
HEART RATE: 101 BPM | WEIGHT: 186 LBS | TEMPERATURE: 98 F | BODY MASS INDEX: 29.19 KG/M2 | HEIGHT: 67 IN | SYSTOLIC BLOOD PRESSURE: 120 MMHG | DIASTOLIC BLOOD PRESSURE: 82 MMHG | OXYGEN SATURATION: 97 %

## 2020-12-07 DIAGNOSIS — E78.2 MIXED HYPERLIPIDEMIA: ICD-10-CM

## 2020-12-07 DIAGNOSIS — Z00.00 MEDICARE ANNUAL WELLNESS VISIT, SUBSEQUENT: Primary | ICD-10-CM

## 2020-12-07 DIAGNOSIS — F41.9 ANXIETY DISORDER, UNSPECIFIED TYPE: ICD-10-CM

## 2020-12-07 DIAGNOSIS — I48.91 ATRIAL FIBRILLATION, UNSPECIFIED TYPE (HCC): ICD-10-CM

## 2020-12-07 DIAGNOSIS — I10 BENIGN ESSENTIAL HYPERTENSION: ICD-10-CM

## 2020-12-07 PROCEDURE — 99214 OFFICE O/P EST MOD 30 MIN: CPT | Performed by: INTERNAL MEDICINE

## 2020-12-07 PROCEDURE — G0439 PPPS, SUBSEQ VISIT: HCPCS | Performed by: INTERNAL MEDICINE

## 2020-12-07 PROCEDURE — 1123F ACP DISCUSS/DSCN MKR DOCD: CPT | Performed by: INTERNAL MEDICINE

## 2020-12-18 ENCOUNTER — LAB (OUTPATIENT)
Dept: LAB | Facility: CLINIC | Age: 80
End: 2020-12-18
Payer: MEDICARE

## 2020-12-18 DIAGNOSIS — I10 BENIGN ESSENTIAL HYPERTENSION: ICD-10-CM

## 2020-12-18 LAB
ALBUMIN SERPL BCP-MCNC: 3.5 G/DL (ref 3.5–5)
ALP SERPL-CCNC: 113 U/L (ref 46–116)
ALT SERPL W P-5'-P-CCNC: 29 U/L (ref 12–78)
ANION GAP SERPL CALCULATED.3IONS-SCNC: 5 MMOL/L (ref 4–13)
AST SERPL W P-5'-P-CCNC: 17 U/L (ref 5–45)
BASOPHILS # BLD AUTO: 0.05 THOUSANDS/ΜL (ref 0–0.1)
BASOPHILS NFR BLD AUTO: 1 % (ref 0–1)
BILIRUB SERPL-MCNC: 0.76 MG/DL (ref 0.2–1)
BUN SERPL-MCNC: 29 MG/DL (ref 5–25)
CALCIUM SERPL-MCNC: 9.5 MG/DL (ref 8.3–10.1)
CHLORIDE SERPL-SCNC: 110 MMOL/L (ref 100–108)
CHOLEST SERPL-MCNC: 172 MG/DL (ref 50–200)
CO2 SERPL-SCNC: 28 MMOL/L (ref 21–32)
CREAT SERPL-MCNC: 1.21 MG/DL (ref 0.6–1.3)
EOSINOPHIL # BLD AUTO: 0.12 THOUSAND/ΜL (ref 0–0.61)
EOSINOPHIL NFR BLD AUTO: 2 % (ref 0–6)
ERYTHROCYTE [DISTWIDTH] IN BLOOD BY AUTOMATED COUNT: 13.1 % (ref 11.6–15.1)
GFR SERPL CREATININE-BSD FRML MDRD: 56 ML/MIN/1.73SQ M
GLUCOSE P FAST SERPL-MCNC: 97 MG/DL (ref 65–99)
HCT VFR BLD AUTO: 51 % (ref 36.5–49.3)
HDLC SERPL-MCNC: 63 MG/DL
HGB BLD-MCNC: 16.1 G/DL (ref 12–17)
IMM GRANULOCYTES # BLD AUTO: 0.02 THOUSAND/UL (ref 0–0.2)
IMM GRANULOCYTES NFR BLD AUTO: 0 % (ref 0–2)
LDLC SERPL CALC-MCNC: 87 MG/DL (ref 0–100)
LYMPHOCYTES # BLD AUTO: 2.23 THOUSANDS/ΜL (ref 0.6–4.47)
LYMPHOCYTES NFR BLD AUTO: 30 % (ref 14–44)
MCH RBC QN AUTO: 29.2 PG (ref 26.8–34.3)
MCHC RBC AUTO-ENTMCNC: 31.6 G/DL (ref 31.4–37.4)
MCV RBC AUTO: 92 FL (ref 82–98)
MONOCYTES # BLD AUTO: 0.77 THOUSAND/ΜL (ref 0.17–1.22)
MONOCYTES NFR BLD AUTO: 10 % (ref 4–12)
NEUTROPHILS # BLD AUTO: 4.3 THOUSANDS/ΜL (ref 1.85–7.62)
NEUTS SEG NFR BLD AUTO: 57 % (ref 43–75)
NONHDLC SERPL-MCNC: 109 MG/DL
NRBC BLD AUTO-RTO: 0 /100 WBCS
PLATELET # BLD AUTO: 205 THOUSANDS/UL (ref 149–390)
PMV BLD AUTO: 9 FL (ref 8.9–12.7)
POTASSIUM SERPL-SCNC: 4.1 MMOL/L (ref 3.5–5.3)
PROT SERPL-MCNC: 7 G/DL (ref 6.4–8.2)
RBC # BLD AUTO: 5.52 MILLION/UL (ref 3.88–5.62)
SODIUM SERPL-SCNC: 143 MMOL/L (ref 136–145)
T4 FREE SERPL-MCNC: 0.79 NG/DL (ref 0.76–1.46)
TRIGL SERPL-MCNC: 110 MG/DL
TSH SERPL DL<=0.05 MIU/L-ACNC: 3.97 UIU/ML (ref 0.36–3.74)
WBC # BLD AUTO: 7.49 THOUSAND/UL (ref 4.31–10.16)

## 2020-12-18 PROCEDURE — 80061 LIPID PANEL: CPT

## 2020-12-18 PROCEDURE — 84439 ASSAY OF FREE THYROXINE: CPT

## 2020-12-18 PROCEDURE — 84443 ASSAY THYROID STIM HORMONE: CPT

## 2020-12-18 PROCEDURE — 36415 COLL VENOUS BLD VENIPUNCTURE: CPT

## 2020-12-18 PROCEDURE — 80053 COMPREHEN METABOLIC PANEL: CPT

## 2020-12-18 PROCEDURE — 85025 COMPLETE CBC W/AUTO DIFF WBC: CPT

## 2020-12-29 ENCOUNTER — TELEPHONE (OUTPATIENT)
Dept: INTERNAL MEDICINE CLINIC | Facility: CLINIC | Age: 80
End: 2020-12-29

## 2021-02-10 ENCOUNTER — IMMUNIZATIONS (OUTPATIENT)
Dept: FAMILY MEDICINE CLINIC | Facility: HOSPITAL | Age: 81
End: 2021-02-10

## 2021-02-10 DIAGNOSIS — Z23 ENCOUNTER FOR IMMUNIZATION: Primary | ICD-10-CM

## 2021-02-10 PROCEDURE — 0011A SARS-COV-2 / COVID-19 MRNA VACCINE (MODERNA) 100 MCG: CPT

## 2021-02-10 PROCEDURE — 91301 SARS-COV-2 / COVID-19 MRNA VACCINE (MODERNA) 100 MCG: CPT

## 2021-03-09 ENCOUNTER — IMMUNIZATIONS (OUTPATIENT)
Dept: FAMILY MEDICINE CLINIC | Facility: HOSPITAL | Age: 81
End: 2021-03-09

## 2021-03-09 DIAGNOSIS — Z23 ENCOUNTER FOR IMMUNIZATION: Primary | ICD-10-CM

## 2021-03-09 PROCEDURE — 0012A SARS-COV-2 / COVID-19 MRNA VACCINE (MODERNA) 100 MCG: CPT

## 2021-03-09 PROCEDURE — 91301 SARS-COV-2 / COVID-19 MRNA VACCINE (MODERNA) 100 MCG: CPT

## 2021-03-17 ENCOUNTER — OFFICE VISIT (OUTPATIENT)
Dept: INTERNAL MEDICINE CLINIC | Facility: CLINIC | Age: 81
End: 2021-03-17
Payer: MEDICARE

## 2021-03-17 VITALS
WEIGHT: 187 LBS | BODY MASS INDEX: 29.35 KG/M2 | SYSTOLIC BLOOD PRESSURE: 134 MMHG | OXYGEN SATURATION: 97 % | DIASTOLIC BLOOD PRESSURE: 80 MMHG | TEMPERATURE: 98 F | HEART RATE: 104 BPM | HEIGHT: 67 IN

## 2021-03-17 DIAGNOSIS — H61.21 IMPACTED CERUMEN, RIGHT EAR: Primary | ICD-10-CM

## 2021-03-17 PROCEDURE — 69210 REMOVE IMPACTED EAR WAX UNI: CPT | Performed by: PHYSICIAN ASSISTANT

## 2021-03-17 NOTE — PROGRESS NOTES
Ear cerumen removal    Date/Time: 3/17/2021 2:19 PM  Performed by: Nata Arshad PA-C  Authorized by: Nata Arshad PA-C   Universal Protocol:  Consent: Verbal consent obtained  Risks and benefits: risks, benefits and alternatives were discussed  Consent given by: patient  Patient understanding: patient states understanding of the procedure being performed  Patient identity confirmed: verbally with patient      Patient location:  Clinic  Procedure details:     Local anesthetic:  None    Location:  R ear    Procedure type: irrigation only      Approach:  External and natural orifice    Visualization (free text):  Otoscope  Post-procedure details:     Complication:  None    Hearing quality:  Improved    Patient tolerance of procedure:   Tolerated well, no immediate complications  Comments:      Postprocedure right tympanic membrane appears within normal limits

## 2021-06-28 ENCOUNTER — TELEPHONE (OUTPATIENT)
Dept: INTERNAL MEDICINE CLINIC | Facility: CLINIC | Age: 81
End: 2021-06-28

## 2021-06-28 DIAGNOSIS — I10 BENIGN ESSENTIAL HYPERTENSION: Primary | ICD-10-CM

## 2021-06-28 NOTE — TELEPHONE ENCOUNTER
Patient has a rescheduled appt for 07/06  He would like to know if there are any labs to be done for this visit?     Call back #[phone]

## 2021-07-01 ENCOUNTER — APPOINTMENT (OUTPATIENT)
Dept: LAB | Facility: CLINIC | Age: 81
End: 2021-07-01
Payer: MEDICARE

## 2021-07-01 DIAGNOSIS — I10 BENIGN ESSENTIAL HYPERTENSION: ICD-10-CM

## 2021-07-01 LAB
ALBUMIN SERPL BCP-MCNC: 3.3 G/DL (ref 3.5–5)
ALP SERPL-CCNC: 108 U/L (ref 46–116)
ALT SERPL W P-5'-P-CCNC: 28 U/L (ref 12–78)
ANION GAP SERPL CALCULATED.3IONS-SCNC: 4 MMOL/L (ref 4–13)
AST SERPL W P-5'-P-CCNC: 18 U/L (ref 5–45)
BASOPHILS # BLD AUTO: 0.06 THOUSANDS/ΜL (ref 0–0.1)
BASOPHILS NFR BLD AUTO: 1 % (ref 0–1)
BILIRUB SERPL-MCNC: 0.71 MG/DL (ref 0.2–1)
BUN SERPL-MCNC: 25 MG/DL (ref 5–25)
CALCIUM ALBUM COR SERPL-MCNC: 9.6 MG/DL (ref 8.3–10.1)
CALCIUM SERPL-MCNC: 9 MG/DL (ref 8.3–10.1)
CHLORIDE SERPL-SCNC: 109 MMOL/L (ref 100–108)
CHOLEST SERPL-MCNC: 202 MG/DL (ref 50–200)
CO2 SERPL-SCNC: 25 MMOL/L (ref 21–32)
CREAT SERPL-MCNC: 1.03 MG/DL (ref 0.6–1.3)
EOSINOPHIL # BLD AUTO: 0.17 THOUSAND/ΜL (ref 0–0.61)
EOSINOPHIL NFR BLD AUTO: 3 % (ref 0–6)
ERYTHROCYTE [DISTWIDTH] IN BLOOD BY AUTOMATED COUNT: 13.7 % (ref 11.6–15.1)
GFR SERPL CREATININE-BSD FRML MDRD: 68 ML/MIN/1.73SQ M
GLUCOSE P FAST SERPL-MCNC: 100 MG/DL (ref 65–99)
HCT VFR BLD AUTO: 49.1 % (ref 36.5–49.3)
HDLC SERPL-MCNC: 60 MG/DL
HGB BLD-MCNC: 16.2 G/DL (ref 12–17)
IMM GRANULOCYTES # BLD AUTO: 0.02 THOUSAND/UL (ref 0–0.2)
IMM GRANULOCYTES NFR BLD AUTO: 0 % (ref 0–2)
LDLC SERPL CALC-MCNC: 115 MG/DL (ref 0–100)
LYMPHOCYTES # BLD AUTO: 2.53 THOUSANDS/ΜL (ref 0.6–4.47)
LYMPHOCYTES NFR BLD AUTO: 37 % (ref 14–44)
MCH RBC QN AUTO: 30.1 PG (ref 26.8–34.3)
MCHC RBC AUTO-ENTMCNC: 33 G/DL (ref 31.4–37.4)
MCV RBC AUTO: 91 FL (ref 82–98)
MONOCYTES # BLD AUTO: 0.67 THOUSAND/ΜL (ref 0.17–1.22)
MONOCYTES NFR BLD AUTO: 10 % (ref 4–12)
NEUTROPHILS # BLD AUTO: 3.31 THOUSANDS/ΜL (ref 1.85–7.62)
NEUTS SEG NFR BLD AUTO: 49 % (ref 43–75)
NONHDLC SERPL-MCNC: 142 MG/DL
NRBC BLD AUTO-RTO: 0 /100 WBCS
PLATELET # BLD AUTO: 197 THOUSANDS/UL (ref 149–390)
PMV BLD AUTO: 9.1 FL (ref 8.9–12.7)
POTASSIUM SERPL-SCNC: 4.1 MMOL/L (ref 3.5–5.3)
PROT SERPL-MCNC: 7 G/DL (ref 6.4–8.2)
RBC # BLD AUTO: 5.39 MILLION/UL (ref 3.88–5.62)
SODIUM SERPL-SCNC: 138 MMOL/L (ref 136–145)
TRIGL SERPL-MCNC: 134 MG/DL
TSH SERPL DL<=0.05 MIU/L-ACNC: 2.69 UIU/ML (ref 0.36–3.74)
WBC # BLD AUTO: 6.76 THOUSAND/UL (ref 4.31–10.16)

## 2021-07-01 PROCEDURE — 84443 ASSAY THYROID STIM HORMONE: CPT

## 2021-07-01 PROCEDURE — 36415 COLL VENOUS BLD VENIPUNCTURE: CPT

## 2021-07-01 PROCEDURE — 80061 LIPID PANEL: CPT

## 2021-07-01 PROCEDURE — 80053 COMPREHEN METABOLIC PANEL: CPT

## 2021-07-01 PROCEDURE — 85025 COMPLETE CBC W/AUTO DIFF WBC: CPT

## 2021-07-06 ENCOUNTER — OFFICE VISIT (OUTPATIENT)
Dept: INTERNAL MEDICINE CLINIC | Facility: CLINIC | Age: 81
End: 2021-07-06
Payer: MEDICARE

## 2021-07-06 VITALS
BODY MASS INDEX: 29.82 KG/M2 | SYSTOLIC BLOOD PRESSURE: 136 MMHG | HEIGHT: 67 IN | HEART RATE: 109 BPM | DIASTOLIC BLOOD PRESSURE: 80 MMHG | WEIGHT: 190 LBS | TEMPERATURE: 98.9 F | OXYGEN SATURATION: 96 %

## 2021-07-06 DIAGNOSIS — E78.2 MIXED HYPERLIPIDEMIA: ICD-10-CM

## 2021-07-06 DIAGNOSIS — I48.91 ATRIAL FIBRILLATION, UNSPECIFIED TYPE (HCC): ICD-10-CM

## 2021-07-06 DIAGNOSIS — F41.9 ANXIETY DISORDER, UNSPECIFIED TYPE: ICD-10-CM

## 2021-07-06 DIAGNOSIS — I10 BENIGN ESSENTIAL HYPERTENSION: Primary | ICD-10-CM

## 2021-07-06 PROCEDURE — 99214 OFFICE O/P EST MOD 30 MIN: CPT | Performed by: INTERNAL MEDICINE

## 2021-07-06 RX ORDER — ASPIRIN 81 MG/1
81 TABLET, CHEWABLE ORAL DAILY
COMMUNITY

## 2021-07-06 NOTE — PROGRESS NOTES
Assessment/Plan:       Chronic problems are stable  Continue present medications  Continue diet and exercise  Ordered labs for next visit  Agree with evaluation for Watchman procedure  Quality Measures:       Return in about 6 months (around 1/6/2022)  No problem-specific Assessment & Plan notes found for this encounter  Diagnoses and all orders for this visit:    Benign essential hypertension  -     CBC and differential; Future  -     Comprehensive metabolic panel; Future  -     Lipid panel; Future  -     TSH, 3rd generation with Free T4 reflex; Future    Mixed hyperlipidemia    Atrial fibrillation, unspecified type (HCC)    Anxiety disorder, unspecified type    Other orders  -     aspirin 81 mg chewable tablet; Chew 81 mg daily          Subjective:      Patient ID: Claudette Belt is a 80 y o  male  Patient comes in today  He states he is doing okay  He is fully vaccinated for COVID now  Blood pressure is controlled  Cholesterol is up slightly  Probably diet  He is taking his medicines as directed  His anxiety is about the same  Still having trouble with his Afib  His rate never is fully controlled  His cardiologist has been asking him to get evaluated for the Watchman procedure  He still is nervous about that  Denies any new complaints today  No further additions to his history        ALLERGIES:  No Known Allergies    CURRENT MEDICATIONS:    Current Outpatient Medications:     amLODIPine (NORVASC) 2 5 mg tablet, TAKE 1 TABLET BY MOUTH  DAILY, Disp: 90 tablet, Rfl: 3    aspirin 81 mg chewable tablet, Chew 81 mg daily, Disp: , Rfl:     atenolol (TENORMIN) 50 mg tablet, Take 1 5 tablets (75 mg total) by mouth 2 (two) times a day, Disp: 60 tablet, Rfl:     atorvastatin (LIPITOR) 20 mg tablet, TAKE 1 TABLET BY MOUTH  DAILY AT BEDTIME, Disp: 90 tablet, Rfl: 3    FLUoxetine (PROzac) 20 mg capsule, TAKE 1 CAPSULE BY MOUTH  DAILY, Disp: 90 capsule, Rfl: 3    ACTIVE PROBLEM LIST:  Patient Active Problem List   Diagnosis    Benign essential hypertension    Hyperlipidemia    Glaucoma    Anxiety disorder    Atrial fibrillation [facility])       PAST MEDICAL HISTORY:  Past Medical History:   Diagnosis Date    Retinal hemorrhage, left     last assessed: 9/2/2015       PAST SURGICAL HISTORY:  Past Surgical History:   Procedure Laterality Date    LITHOTRIPSY         FAMILY HISTORY:  History reviewed  No pertinent family history  SOCIAL HISTORY:  Social History     Socioeconomic History    Marital status: /Civil Union     Spouse name: Not on file    Number of children: Not on file    Years of education: Not on file    Highest education level: Not on file   Occupational History    Occupation: retired   Tobacco Use    Smoking status: Former Smoker    Smokeless tobacco: Never Used   Substance and Sexual Activity    Alcohol use: Not on file    Drug use: No    Sexual activity: Not on file     Comment: denied: histroy of high risk sexual behavior   Other Topics Concern    Not on file   Social History Narrative    Not on file     Social Determinants of Health     Financial Resource Strain:     Difficulty of Paying Living Expenses:    Food Insecurity:     Worried About [address] in the Last Year:     [address] in the Last Year:    Transportation Needs:     Lack of Transportation (Medical):      Lack of Transportation (Non-Medical):    Physical Activity:     Days of Exercise per Week:     Minutes of Exercise per Session:    Stress:     Feeling of Stress :    Social Connections:     Frequency of Communication with Friends and Family:     Frequency of Social Gatherings with Friends and Family:     Attends Jewish Services:     Active Member of Clubs or Organizations:     Attends Club or Organization Meetings:     Marital Status:    Intimate Partner Violence:     Fear of Current or Ex-Partner:     Emotionally Abused:     Physically Abused:     Sexually Abused:        Review of Systems   Respiratory: Negative for shortness of breath  Cardiovascular: Negative for chest pain  Gastrointestinal: Negative for abdominal pain  Objective:  Vitals:    07/06/21 1559   BP: 136/80   BP Location: Left arm   Patient Position: Sitting   Cuff Size: Adult   Pulse: (!) 109   Temp: 98 9 °F (37 2 °C)   TempSrc: Temporal   SpO2: 96%   Weight: 86 2 kg (190 lb)   Height: 5' 7" (1 702 m)     Body mass index is 29 76 kg/m²  Physical Exam  Vitals and nursing note reviewed  Constitutional:       Appearance: He is well-developed  Cardiovascular:      Rate and Rhythm: Normal rate and regular rhythm  Heart sounds: Normal heart sounds  Pulmonary:      Effort: Pulmonary effort is normal       Breath sounds: Normal breath sounds  Abdominal:      Palpations: Abdomen is soft  Tenderness: There is no abdominal tenderness  Neurological:      Mental Status: He is alert and oriented to person, place, and time             RESULTS:    Recent Results (from the past 1008 hour(s))   CBC and differential    Collection Time: 07/01/21 10:26 AM   Result Value Ref Range    WBC 6 76 4 31 - 10 16 Thousand/uL    RBC 5 39 3 88 - 5 62 Million/uL    Hemoglobin 16 2 12 0 - 17 0 g/dL    Hematocrit 49 1 36 5 - 49 3 %    MCV 91 82 - 98 fL    MCH 30 1 26 8 - 34 3 pg    MCHC 33 0 31 4 - 37 4 g/dL    RDW 13 7 11 6 - 15 1 %    MPV 9 1 8 9 - 12 7 fL    Platelets 866 167 - 940 Thousands/uL    nRBC 0 /100 WBCs    Neutrophils Relative 49 43 - 75 %    Immat GRANS % 0 0 - 2 %    Lymphocytes Relative 37 14 - 44 %    Monocytes Relative 10 4 - 12 %    Eosinophils Relative 3 0 - 6 %    Basophils Relative 1 0 - 1 %    Neutrophils Absolute 3 31 1 85 - 7 62 Thousands/µL    Immature Grans Absolute 0 02 0 00 - 0 20 Thousand/uL    Lymphocytes Absolute 2 53 0 60 - 4 47 Thousands/µL    Monocytes Absolute 0 67 0 17 - 1 22 Thousand/µL    Eosinophils Absolute 0 17 0 00 - 0 61 Thousand/µL    Basophils Absolute 0 06 0 00 - 0 10 Thousands/µL   Comprehensive metabolic panel    Collection Time: 07/01/21 10:26 AM   Result Value Ref Range    Sodium 138 136 - 145 mmol/L    Potassium 4 1 3 5 - 5 3 mmol/L    Chloride 109 (H) 100 - 108 mmol/L    CO2 25 21 - 32 mmol/L    ANION GAP 4 4 - 13 mmol/L    BUN 25 5 - 25 mg/dL    Creatinine 1 03 0 60 - 1 30 mg/dL    Glucose, Fasting 100 (H) 65 - 99 mg/dL    Calcium 9 0 8 3 - 10 1 mg/dL    Corrected Calcium 9 6 8 3 - 10 1 mg/dL    AST 18 5 - 45 U/L    ALT 28 12 - 78 U/L    Alkaline Phosphatase 108 46 - 116 U/L    Total Protein 7 0 6 4 - 8 2 g/dL    Albumin 3 3 (L) 3 5 - 5 0 g/dL    Total Bilirubin 0 71 0 20 - 1 00 mg/dL    eGFR 68 ml/min/1 73sq m   Lipid panel    Collection Time: 07/01/21 10:26 AM   Result Value Ref Range    Cholesterol 202 (H) 50 - 200 mg/dL    Triglycerides 134 <=150 mg/dL    HDL, Direct 60 >=40 mg/dL    LDL Calculated 115 (H) 0 - 100 mg/dL    Non-HDL-Chol (CHOL-HDL) 142 mg/dl   TSH, 3rd generation with Free T4 reflex    Collection Time: 07/01/21 10:26 AM   Result Value Ref Range    TSH 3RD GENERATON 2 690 0 358 - 3 740 uIU/mL       This note was created with voice recognition software  Phonic, grammatical and spelling errors may be present within the note as a result

## 2021-12-31 ENCOUNTER — APPOINTMENT (OUTPATIENT)
Dept: LAB | Facility: CLINIC | Age: 81
End: 2021-12-31
Payer: MEDICARE

## 2021-12-31 DIAGNOSIS — I10 BENIGN ESSENTIAL HYPERTENSION: ICD-10-CM

## 2021-12-31 LAB
ALBUMIN SERPL BCP-MCNC: 3.5 G/DL (ref 3.5–5)
ALP SERPL-CCNC: 97 U/L (ref 46–116)
ALT SERPL W P-5'-P-CCNC: 29 U/L (ref 12–78)
ANION GAP SERPL CALCULATED.3IONS-SCNC: 4 MMOL/L (ref 4–13)
AST SERPL W P-5'-P-CCNC: 20 U/L (ref 5–45)
BASOPHILS # BLD AUTO: 0.07 THOUSANDS/ΜL (ref 0–0.1)
BASOPHILS NFR BLD AUTO: 1 % (ref 0–1)
BILIRUB SERPL-MCNC: 0.81 MG/DL (ref 0.2–1)
BUN SERPL-MCNC: 23 MG/DL (ref 5–25)
CALCIUM SERPL-MCNC: 9.2 MG/DL (ref 8.3–10.1)
CHLORIDE SERPL-SCNC: 108 MMOL/L (ref 100–108)
CHOLEST SERPL-MCNC: 192 MG/DL
CO2 SERPL-SCNC: 28 MMOL/L (ref 21–32)
CREAT SERPL-MCNC: 1.09 MG/DL (ref 0.6–1.3)
EOSINOPHIL # BLD AUTO: 0.15 THOUSAND/ΜL (ref 0–0.61)
EOSINOPHIL NFR BLD AUTO: 2 % (ref 0–6)
ERYTHROCYTE [DISTWIDTH] IN BLOOD BY AUTOMATED COUNT: 13.6 % (ref 11.6–15.1)
GFR SERPL CREATININE-BSD FRML MDRD: 63 ML/MIN/1.73SQ M
GLUCOSE P FAST SERPL-MCNC: 90 MG/DL (ref 65–99)
HCT VFR BLD AUTO: 47.5 % (ref 36.5–49.3)
HDLC SERPL-MCNC: 56 MG/DL
HGB BLD-MCNC: 15.7 G/DL (ref 12–17)
IMM GRANULOCYTES # BLD AUTO: 0.02 THOUSAND/UL (ref 0–0.2)
IMM GRANULOCYTES NFR BLD AUTO: 0 % (ref 0–2)
LDLC SERPL CALC-MCNC: 109 MG/DL (ref 0–100)
LYMPHOCYTES # BLD AUTO: 2.24 THOUSANDS/ΜL (ref 0.6–4.47)
LYMPHOCYTES NFR BLD AUTO: 32 % (ref 14–44)
MCH RBC QN AUTO: 30.1 PG (ref 26.8–34.3)
MCHC RBC AUTO-ENTMCNC: 33.1 G/DL (ref 31.4–37.4)
MCV RBC AUTO: 91 FL (ref 82–98)
MONOCYTES # BLD AUTO: 0.75 THOUSAND/ΜL (ref 0.17–1.22)
MONOCYTES NFR BLD AUTO: 11 % (ref 4–12)
NEUTROPHILS # BLD AUTO: 3.7 THOUSANDS/ΜL (ref 1.85–7.62)
NEUTS SEG NFR BLD AUTO: 54 % (ref 43–75)
NONHDLC SERPL-MCNC: 136 MG/DL
NRBC BLD AUTO-RTO: 0 /100 WBCS
PLATELET # BLD AUTO: 196 THOUSANDS/UL (ref 149–390)
PMV BLD AUTO: 9.1 FL (ref 8.9–12.7)
POTASSIUM SERPL-SCNC: 4.2 MMOL/L (ref 3.5–5.3)
PROT SERPL-MCNC: 6.6 G/DL (ref 6.4–8.2)
RBC # BLD AUTO: 5.22 MILLION/UL (ref 3.88–5.62)
SODIUM SERPL-SCNC: 140 MMOL/L (ref 136–145)
TRIGL SERPL-MCNC: 134 MG/DL
TSH SERPL DL<=0.05 MIU/L-ACNC: 3.44 UIU/ML (ref 0.36–3.74)
WBC # BLD AUTO: 6.93 THOUSAND/UL (ref 4.31–10.16)

## 2021-12-31 PROCEDURE — 80061 LIPID PANEL: CPT

## 2021-12-31 PROCEDURE — 84443 ASSAY THYROID STIM HORMONE: CPT

## 2021-12-31 PROCEDURE — 80053 COMPREHEN METABOLIC PANEL: CPT

## 2021-12-31 PROCEDURE — 85025 COMPLETE CBC W/AUTO DIFF WBC: CPT

## 2021-12-31 PROCEDURE — 36415 COLL VENOUS BLD VENIPUNCTURE: CPT

## 2022-01-06 ENCOUNTER — OFFICE VISIT (OUTPATIENT)
Dept: INTERNAL MEDICINE CLINIC | Facility: CLINIC | Age: 82
End: 2022-01-06
Payer: MEDICARE

## 2022-01-06 VITALS
HEART RATE: 107 BPM | HEIGHT: 67 IN | TEMPERATURE: 97.9 F | WEIGHT: 189 LBS | SYSTOLIC BLOOD PRESSURE: 136 MMHG | OXYGEN SATURATION: 96 % | RESPIRATION RATE: 18 BRPM | BODY MASS INDEX: 29.66 KG/M2 | DIASTOLIC BLOOD PRESSURE: 88 MMHG

## 2022-01-06 DIAGNOSIS — Z00.00 ENCOUNTER FOR MEDICARE ANNUAL WELLNESS EXAM: Primary | ICD-10-CM

## 2022-01-06 DIAGNOSIS — I48.91 ATRIAL FIBRILLATION, UNSPECIFIED TYPE (HCC): ICD-10-CM

## 2022-01-06 DIAGNOSIS — E78.2 MIXED HYPERLIPIDEMIA: ICD-10-CM

## 2022-01-06 DIAGNOSIS — I10 BENIGN ESSENTIAL HYPERTENSION: ICD-10-CM

## 2022-01-06 PROCEDURE — 99214 OFFICE O/P EST MOD 30 MIN: CPT | Performed by: PHYSICIAN ASSISTANT

## 2022-01-06 PROCEDURE — G0439 PPPS, SUBSEQ VISIT: HCPCS | Performed by: PHYSICIAN ASSISTANT

## 2022-01-06 RX ORDER — ATORVASTATIN CALCIUM 40 MG/1
40 TABLET, FILM COATED ORAL
Qty: 90 TABLET | Refills: 1 | Status: SHIPPED | OUTPATIENT
Start: 2022-01-06 | End: 2022-05-16

## 2022-01-06 RX ORDER — NAPROXEN 500 MG/1
500 TABLET ORAL 2 TIMES DAILY WITH MEALS
COMMUNITY
Start: 2021-11-09 | End: 2022-01-06 | Stop reason: ALTCHOICE

## 2022-01-06 NOTE — PATIENT INSTRUCTIONS
Will increase atorvastatin to 40 mg daily for better lipid control  No other medication changes at this time  Continue follow-up with cardiology as you are doing  Recommend follow-up here in 6 months with repeat blood work for that visit, sooner as needed  Medicare Preventive Visit Patient Instructions  Thank you for completing your Welcome to Medicare Visit or Medicare Annual Wellness Visit today  Your next wellness visit will be due in one year (1/7/2023)  The screening/preventive services that you may require over the next 5-10 years are detailed below  Some tests may not apply to you based off risk factors and/or age  Screening tests ordered at today's visit but not completed yet may show as past due  Also, please note that scanned in results may not display below  Preventive Screenings:  Service Recommendations Previous Testing/Comments   Colorectal Cancer Screening  · Colonoscopy    · Fecal Occult Blood Test (FOBT)/Fecal Immunochemical Test (FIT)  · Fecal DNA/Cologuard Test  · Flexible Sigmoidoscopy Age: 54-65 years old   Colonoscopy: every 10 years (May be performed more frequently if at higher risk)  OR  FOBT/FIT: every 1 year  OR  Cologuard: every 3 years  OR  Sigmoidoscopy: every 5 years  Screening may be recommended earlier than age 48 if at higher risk for colorectal cancer  Also, an individualized decision between you and your healthcare provider will decide whether screening between the ages of 74-80 would be appropriate   Colonoscopy: Not on file  FOBT/FIT: Not on file  Cologuard: Not on file  Sigmoidoscopy: Not on file          Prostate Cancer Screening Individualized decision between patient and health care provider in men between ages of 53-78   Medicare will cover every 12 months beginning on the day after your 50th birthday PSA: 0 7 ng/mL     Screening Not Indicated     Hepatitis C Screening Once for adults born between [facility]  More frequently in patients at high risk for Hepatitis C Hep C Antibody: Not on file        Diabetes Screening 1-2 times per year if you're at risk for diabetes or have pre-diabetes Fasting glucose: 90 mg/dL   A1C: 5 6    Screening Current   Cholesterol Screening Once every 5 years if you don't have a lipid disorder  May order more often based on risk factors  Lipid panel: 12/31/2021    Screening Not Indicated  History Lipid Disorder      Other Preventive Screenings Covered by Medicare:  1  Abdominal Aortic Aneurysm (AAA) Screening: covered once if your at risk  You're considered to be at risk if you have a family history of AAA or a male between the age of 73-68 who smoking at least 100 cigarettes in your lifetime  2  Lung Cancer Screening: covers low dose CT scan once per year if you meet all of the following conditions: (1) Age 50-69; (2) No signs or symptoms of lung cancer; (3) Current smoker or have quit smoking within the last 15 years; (4) You have a tobacco smoking history of at least 30 pack years (packs per day x number of years you smoked); (5) You get a written order from a healthcare provider  3  Glaucoma Screening: covered annually if you're considered high risk: (1) You have diabetes OR (2) Family history of glaucoma OR (3)  aged 48 and older OR (3)  American aged 72 and older  3  Osteoporosis Screening: covered every 2 years if you meet one of the following conditions: (1) Have a vertebral abnormality; (2) On glucocorticoid therapy for more than 3 months; (3) Have primary hyperparathyroidism; (4) On osteoporosis medications and need to assess response to drug therapy  5  HIV Screening: covered annually if you're between the age of 12-76  Also covered annually if you are younger than 13 and older than 72 with risk factors for HIV infection  For pregnant patients, it is covered up to 3 times per pregnancy      Immunizations:  Immunization Recommendations   Influenza Vaccine Annual influenza vaccination during flu season is recommended for all persons aged >= 6 months who do not have contraindications   Pneumococcal Vaccine (Prevnar and Pneumovax)  * Prevnar = PCV13  * Pneumovax = PPSV23 Adults 25-60 years old: 1-3 doses may be recommended based on certain risk factors  Adults 72 years old: Prevnar (PCV13) vaccine recommended followed by Pneumovax (PPSV23) vaccine  If already received PPSV23 since turning 65, then PCV13 recommended at least one year after PPSV23 dose  Hepatitis B Vaccine 3 dose series if at intermediate or high risk (ex: diabetes, end stage renal disease, liver disease)   Tetanus (Td) Vaccine - COST NOT COVERED BY MEDICARE PART B Following completion of primary series, a booster dose should be given every 10 years to maintain immunity against tetanus  Td may also be given as tetanus wound prophylaxis  Tdap Vaccine - COST NOT COVERED BY MEDICARE PART B Recommended at least once for all adults  For pregnant patients, recommended with each pregnancy  Shingles Vaccine (Shingrix) - COST NOT COVERED BY MEDICARE PART B  2 shot series recommended in those aged 48 and above     Health Maintenance Due:  There are no preventive care reminders to display for this patient  Immunizations Due:      Topic Date Due    COVID-19 Vaccine (3 - Booster for Moderna series) 09/09/2021     Advance Directives   What are advance directives? Advance directives are legal documents that state your wishes and plans for medical care  These plans are made ahead of time in case you lose your ability to make decisions for yourself  Advance directives can apply to any medical decision, such as the treatments you want, and if you want to donate organs  What are the types of advance directives? There are many types of advance directives, and each state has rules about how to use them  You may choose a combination of any of the following:  · Living will: This is a written record of the treatment you want   You can also choose which treatments you do not want, which to limit, and which to stop at a certain time  This includes surgery, medicine, IV fluid, and tube feedings  · Durable power of  for healthcare [facility]): This is a written record that states who you want to make healthcare choices for you when you are unable to make them for yourself  This person, called a proxy, is usually a family member or a friend  You may choose more than 1 proxy  · Do not resuscitate (DNR) order:  A DNR order is used in case your heart stops beating or you stop breathing  It is a request not to have certain forms of treatment, such as CPR  A DNR order may be included in other types of advance directives  · Medical directive: This covers the care that you want if you are in a coma, near death, or unable to make decisions for yourself  You can list the treatments you want for each condition  Treatment may include pain medicine, surgery, blood transfusions, dialysis, IV or tube feedings, and a ventilator (breathing machine)  · Values history: This document has questions about your views, beliefs, and how you feel and think about life  This information can help others choose the care that you would choose  Why are advance directives important? An advance directive helps you control your care  Although spoken wishes may be used, it is better to have your wishes written down  Spoken wishes can be misunderstood, or not followed  Treatments may be given even if you do not want them  An advance directive may make it easier for your family to make difficult choices about your care  Weight Management   Why it is important to manage your weight:  Being overweight increases your risk of health conditions such as heart disease, high blood pressure, type 2 diabetes, and certain types of cancer  It can also increase your risk for osteoarthritis, sleep apnea, and other respiratory problems  Aim for a slow, steady weight loss   Even a small amount of weight loss can lower your risk of health problems  How to lose weight safely:  A safe and healthy way to lose weight is to eat fewer calories and get regular exercise  You can lose up about 1 pound a week by decreasing the number of calories you eat by 500 calories each day  Healthy meal plan for weight management:  A healthy meal plan includes a variety of foods, contains fewer calories, and helps you stay healthy  A healthy meal plan includes the following:  · Eat whole-grain foods more often  A healthy meal plan should contain fiber  Fiber is the part of grains, fruits, and vegetables that is not broken down by your body  Whole-grain foods are healthy and provide extra fiber in your diet  Some examples of whole-grain foods are whole-wheat breads and pastas, oatmeal, brown rice, and bulgur  · Eat a variety of vegetables every day  Include dark, leafy greens such as spinach, kale, yeny greens, and mustard greens  Eat yellow and orange vegetables such as carrots, sweet potatoes, and winter squash  · Eat a variety of fruits every day  Choose fresh or canned fruit (canned in its own juice or light syrup) instead of juice  Fruit juice has very little or no fiber  · Eat low-fat dairy foods  Drink fat-free (skim) milk or 1% milk  Eat fat-free yogurt and low-fat cottage cheese  Try low-fat cheeses such as mozzarella and other reduced-fat cheeses  · Choose meat and other protein foods that are low in fat  Choose beans or other legumes such as split peas or lentils  Choose fish, skinless poultry (chicken or turkey), or lean cuts of red meat (beef or pork)  Before you cook meat or poultry, cut off any visible fat  · Use less fat and oil  Try baking foods instead of frying them  Add less fat, such as margarine, sour cream, regular salad dressing and mayonnaise to foods  Eat fewer high-fat foods  Some examples of high-fat foods include french fries, doughnuts, ice cream, and cakes  · Eat fewer sweets    Limit foods and drinks that are high in sugar  This includes candy, cookies, regular soda, and sweetened drinks  Exercise:  Exercise at least 30 minutes per day on most days of the week  Some examples of exercise include walking, biking, dancing, and swimming  You can also fit in more physical activity by taking the stairs instead of the elevator or parking farther away from stores  Ask your healthcare provider about the best exercise plan for you  © Copyright [employer] 2018 Information is for End User's use only and may not be sold, redistributed or otherwise used for commercial purposes   All illustrations and images included in CareNotes® are the copyrighted property of [employer]  or [address] [place] [address]

## 2022-01-06 NOTE — PROGRESS NOTES
Assessment/Plan:   Will increase atorvastatin to 40 mg daily for better lipid control  No other medication changes at this time  Continue follow-up with cardiology as you are doing  Recommend follow-up here in 6 months with repeat blood work for that visit, sooner as needed  Quality Measures:   Depression Screening and Follow-up Plan: Patient was screened for depression during today's encounter  They screened negative with a PHQ-2 score of 2  Return in about 6 months (around 7/6/2022) for Next scheduled follow up-Pt preference  Diagnoses and all orders for this visit:    Encounter for Medicare annual wellness exam    Benign essential hypertension    Atrial fibrillation, unspecified type ([facility] [address] )    Mixed hyperlipidemia  -     atorvastatin (LIPITOR) 40 mg tablet; Take 1 tablet (40 mg total) by mouth daily at bedtime  -     Comprehensive metabolic panel; Future  -     Lipid panel; Future    Other orders  -     Diclofenac Sodium (VOLTAREN) 1 %  -     Discontinue: naproxen (NAPROSYN) 500 mg tablet; Take 500 mg by mouth 2 (two) times a day with meals (Patient not taking: Reported on 1/6/2022 )          Subjective:      Patient ID: Royal Schmitt is a 80 y o  male  Follow-up, labs reviewed with patient    Hypertension:  Very faint blood pressure  Acceptable  [place] cp, palp, sob, edema, HA, dizziness, diaphoresis, syncope, visual disturbance  Atrial fibrillation:  Patient's cardiologist wants him to consider the Watchman procedure due to his intolerance to anticoagulants cause and easy bleeding  I discussed this with patient also and thought that he should seriously consider the procedure  Hyperlipidemia:  Decent control however LDL is not at goal for a cardiac patient  I did discuss this with him and he is in agreement to increase his atorvastatin to 40 mg daily  Encourage patient to avoid NSAIDs and to hydrate better      Depression/anxiety:  Symptoms controlled on the Fluoxetine  ALLERGIES:  No Known Allergies    CURRENT MEDICATIONS:    Current Outpatient Medications:     amLODIPine (NORVASC) 2 5 mg tablet, TAKE 1 TABLET BY MOUTH  DAILY, Disp: 90 tablet, Rfl: 3    aspirin 81 mg chewable tablet, Chew 81 mg daily, Disp: , Rfl:     atenolol (TENORMIN) 50 mg tablet, Take 1 5 tablets (75 mg total) by mouth 2 (two) times a day, Disp: 60 tablet, Rfl:     atorvastatin (LIPITOR) 40 mg tablet, Take 1 tablet (40 mg total) by mouth daily at bedtime, Disp: 90 tablet, Rfl: 1    Diclofenac Sodium (VOLTAREN) 1 %, , Disp: , Rfl:     FLUoxetine (PROzac) 20 mg capsule, TAKE 1 CAPSULE BY MOUTH  DAILY, Disp: 90 capsule, Rfl: 3    ACTIVE PROBLEM LIST:  Patient Active Problem List   Diagnosis    Benign essential hypertension    Hyperlipidemia    Glaucoma    Anxiety disorder    Atrial fibrillation (HCC)       PAST MEDICAL HISTORY:  Past Medical History:   Diagnosis Date    Retinal hemorrhage, left     last assessed: 9/2/2015       PAST SURGICAL HISTORY:  Past Surgical History:   Procedure Laterality Date    LITHOTRIPSY         FAMILY HISTORY:  History reviewed  No pertinent family history      SOCIAL HISTORY:  Social History     Socioeconomic History    Marital status: /Civil Union     Spouse name: Not on file    Number of children: Not on file    Years of education: Not on file    Highest education level: Not on file   Occupational History    Occupation: retired   Tobacco Use    Smoking status: Former Smoker    Smokeless tobacco: Never Used   Substance and Sexual Activity    Alcohol use: Not on file    Drug use: No    Sexual activity: Not on file     Comment: denied: histroy of high risk sexual behavior   Other Topics Concern    Not on file   Social History Narrative    Not on file     Social Determinants of Health     Financial Resource Strain: Not on file   Food Insecurity: Not on file   Transportation Needs: Not on file   Physical Activity: Not on file   Stress: Not on file   Social Connections: Not on file   Intimate Partner Violence: Not on file   Housing Stability: Not on file       Review of Systems   Constitutional: Negative for activity change, chills, fatigue and fever  HENT: Negative for congestion  Eyes: Positive for visual disturbance (  Wears glasses)  Negative for discharge  Respiratory: Negative for cough, chest tightness and shortness of breath  Cardiovascular: Negative for chest pain, palpitations and leg swelling  Gastrointestinal: Negative for abdominal pain, blood in stool, constipation, diarrhea, nausea and vomiting  Endocrine: Negative for polydipsia, polyphagia and polyuria  Genitourinary: Negative for difficulty urinating  Musculoskeletal: Negative for arthralgias and myalgias  Skin: Negative for rash  Allergic/Immunologic: Negative for immunocompromised state  Neurological: Negative for dizziness, syncope, weakness, light-headedness and headaches  Hematological: Negative for adenopathy  Does not bruise/bleed easily  Psychiatric/Behavioral: Positive for sleep disturbance ( states he has not been sleeping as well recently  )  Negative for dysphoric mood and suicidal ideas  The patient is not nervous/anxious  Objective:  Vitals:    01/06/22 1457 01/06/22 1526   BP: 156/92 136/88   BP Location: Left arm Left arm   Patient Position: Sitting Sitting   Cuff Size: Adult    Pulse: (!) 107    Resp: 18    Temp: 97 9 °F (36 6 °C)    TempSrc: Tympanic    SpO2: 96%    Weight: 85 7 kg (189 lb)    Height: 5' 7" (1 702 m)      Body mass index is 29 6 kg/m²  Physical Exam  Vitals and nursing note reviewed  Constitutional:       General: He is not in acute distress  Appearance: He is well-developed  HENT:      Head: Normocephalic and atraumatic  Eyes:      Extraocular Movements: Extraocular movements intact  Pupils: Pupils are equal, round, and reactive to light  Neck:      Thyroid: No thyromegaly        Vascular: No carotid bruit or JVD  Cardiovascular:      Rate and Rhythm: Normal rate  Rhythm irregularly irregular  Heart sounds: Normal heart sounds  Pulmonary:      Effort: Pulmonary effort is normal  No respiratory distress  Breath sounds: Normal breath sounds  Musculoskeletal:      Cervical back: Neck supple  Right lower leg: No edema  Left lower leg: No edema  Lymphadenopathy:      Cervical: No cervical adenopathy  Skin:     General: Skin is warm and dry  Findings: No rash  Neurological:      General: No focal deficit present  Mental Status: He is alert and oriented to person, place, and time  Mental status is at baseline     Psychiatric:         Mood and Affect: Mood normal          Behavior: Behavior normal            RESULTS:    Recent Results (from the past 1008 hour(s))   CBC and differential    Collection Time: 12/31/21 10:19 AM   Result Value Ref Range    WBC 6 93 4 31 - 10 16 Thousand/uL    RBC 5 22 3 88 - 5 62 Million/uL    Hemoglobin 15 7 12 0 - 17 0 g/dL    Hematocrit 47 5 36 5 - 49 3 %    MCV 91 82 - 98 fL    MCH 30 1 26 8 - 34 3 pg    MCHC 33 1 31 4 - 37 4 g/dL    RDW 13 6 11 6 - 15 1 %    MPV 9 1 8 9 - 12 7 fL    Platelets 253 460 - 359 Thousands/uL    nRBC 0 /100 WBCs    Neutrophils Relative 54 43 - 75 %    Immat GRANS % 0 0 - 2 %    Lymphocytes Relative 32 14 - 44 %    Monocytes Relative 11 4 - 12 %    Eosinophils Relative 2 0 - 6 %    Basophils Relative 1 0 - 1 %    Neutrophils Absolute 3 70 1 85 - 7 62 Thousands/µL    Immature Grans Absolute 0 02 0 00 - 0 20 Thousand/uL    Lymphocytes Absolute 2 24 0 60 - 4 47 Thousands/µL    Monocytes Absolute 0 75 0 17 - 1 22 Thousand/µL    Eosinophils Absolute 0 15 0 00 - 0 61 Thousand/µL    Basophils Absolute 0 07 0 00 - 0 10 Thousands/µL   Comprehensive metabolic panel    Collection Time: 12/31/21 10:19 AM   Result Value Ref Range    Sodium 140 136 - 145 mmol/L    Potassium 4 2 3 5 - 5 3 mmol/L    Chloride 108 100 - 108 mmol/L    CO2 28 21 - 32 mmol/L    ANION GAP 4 4 - 13 mmol/L    BUN 23 5 - 25 mg/dL    Creatinine 1 09 0 60 - 1 30 mg/dL    Glucose, Fasting 90 65 - 99 mg/dL    Calcium 9 2 8 3 - 10 1 mg/dL    AST 20 5 - 45 U/L    ALT 29 12 - 78 U/L    Alkaline Phosphatase 97 46 - 116 U/L    Total Protein 6 6 6 4 - 8 2 g/dL    Albumin 3 5 3 5 - 5 0 g/dL    Total Bilirubin 0 81 0 20 - 1 00 mg/dL    eGFR 63 ml/min/1 73sq m   Lipid panel    Collection Time: 12/31/21 10:19 AM   Result Value Ref Range    Cholesterol 192 See Comment mg/dL    Triglycerides 134 See Comment mg/dL    HDL, Direct 56 >=40 mg/dL    LDL Calculated 109 (H) 0 - 100 mg/dL    Non-HDL-Chol (CHOL-HDL) 136 mg/dl   TSH, 3rd generation with Free T4 reflex    Collection Time: 12/31/21 10:19 AM   Result Value Ref Range    TSH 3RD GENERATON 3 440 0 358 - 3 740 uIU/mL       This note was created with voice recognition software  Phonic, grammatical and spelling errors may be present within the note as a result

## 2022-04-13 ENCOUNTER — OFFICE VISIT (OUTPATIENT)
Dept: INTERNAL MEDICINE CLINIC | Facility: CLINIC | Age: 82
End: 2022-04-13
Payer: MEDICARE

## 2022-04-13 VITALS
OXYGEN SATURATION: 97 % | WEIGHT: 190.6 LBS | DIASTOLIC BLOOD PRESSURE: 90 MMHG | BODY MASS INDEX: 29.91 KG/M2 | SYSTOLIC BLOOD PRESSURE: 152 MMHG | HEART RATE: 114 BPM | TEMPERATURE: 98.6 F | RESPIRATION RATE: 18 BRPM | HEIGHT: 67 IN

## 2022-04-13 DIAGNOSIS — J20.9 ACUTE BRONCHITIS, UNSPECIFIED ORGANISM: Primary | ICD-10-CM

## 2022-04-13 DIAGNOSIS — I48.91 ATRIAL FIBRILLATION, UNSPECIFIED TYPE (HCC): ICD-10-CM

## 2022-04-13 DIAGNOSIS — I10 BENIGN ESSENTIAL HYPERTENSION: ICD-10-CM

## 2022-04-13 PROCEDURE — 99214 OFFICE O/P EST MOD 30 MIN: CPT | Performed by: INTERNAL MEDICINE

## 2022-04-13 RX ORDER — CEFUROXIME AXETIL 250 MG/1
250 TABLET ORAL EVERY 12 HOURS SCHEDULED
Qty: 20 TABLET | Refills: 0 | Status: SHIPPED | OUTPATIENT
Start: 2022-04-13 | End: 2022-04-23

## 2022-04-13 RX ORDER — METHYLPREDNISOLONE 4 MG/1
TABLET ORAL
Qty: 21 EACH | Refills: 0 | Status: SHIPPED | OUTPATIENT
Start: 2022-04-13 | End: 2022-04-22 | Stop reason: ALTCHOICE

## 2022-04-13 NOTE — PROGRESS NOTES
Assessment/Plan:       Failing conservative measures  Avoid decongestants because of his medical problems, especially blood pressure  Quality Measures:       Return if symptoms worsen or fail to improve  No problem-specific Assessment & Plan notes found for this encounter  Diagnoses and all orders for this visit:    Acute bronchitis, unspecified organism  -     cefuroxime (CEFTIN) 250 mg tablet; Take 1 tablet (250 mg total) by mouth every 12 (twelve) hours for 10 days  -     methylPREDNISolone 4 MG tablet therapy pack; Use as directed on package          Subjective:      Patient ID: Sheryl Newby is a 80 y o  male  Patient comes in today complaining of productive cough for over 2 weeks now  Tried over-the-counter remedies with no relief  Took 2 COVID tests which were negative  No fevers  Wife is not sick  Not usually bothered by seasonal allergies  Concerned because it is just not going away        ALLERGIES:  No Known Allergies    CURRENT MEDICATIONS:    Current Outpatient Medications:     amLODIPine (NORVASC) 2 5 mg tablet, TAKE 1 TABLET BY MOUTH  DAILY, Disp: 90 tablet, Rfl: 3    aspirin 81 mg chewable tablet, Chew 81 mg daily, Disp: , Rfl:     atenolol (TENORMIN) 50 mg tablet, Take 1 5 tablets (75 mg total) by mouth 2 (two) times a day, Disp: 60 tablet, Rfl:     atorvastatin (LIPITOR) 40 mg tablet, Take 1 tablet (40 mg total) by mouth daily at bedtime, Disp: 90 tablet, Rfl: 1    Diclofenac Sodium (VOLTAREN) 1 %, , Disp: , Rfl:     FLUoxetine (PROzac) 20 mg capsule, TAKE 1 CAPSULE BY MOUTH  DAILY, Disp: 90 capsule, Rfl: 3    cefuroxime (CEFTIN) 250 mg tablet, Take 1 tablet (250 mg total) by mouth every 12 (twelve) hours for 10 days, Disp: 20 tablet, Rfl: 0    methylPREDNISolone 4 MG tablet therapy pack, Use as directed on package, Disp: 21 each, Rfl: 0    ACTIVE PROBLEM LIST:  Patient Active Problem List   Diagnosis    Benign essential hypertension    Hyperlipidemia    Glaucoma    Anxiety disorder    Atrial fibrillation [facility])       PAST MEDICAL HISTORY:  Past Medical History:   Diagnosis Date    Retinal hemorrhage, left     last assessed: 9/2/2015       PAST SURGICAL HISTORY:  Past Surgical History:   Procedure Laterality Date    LITHOTRIPSY         FAMILY HISTORY:  History reviewed  No pertinent family history  SOCIAL HISTORY:  Social History     Socioeconomic History    Marital status: /Civil Union     Spouse name: Not on file    Number of children: Not on file    Years of education: Not on file    Highest education level: Not on file   Occupational History    Occupation: retired   Tobacco Use    Smoking status: Former Smoker    Smokeless tobacco: Never Used   Substance and Sexual Activity    Alcohol use: Not on file    Drug use: No    Sexual activity: Not on file     Comment: denied: histroy of high risk sexual behavior   Other Topics Concern    Not on file   Social History Narrative    Not on file     Social Determinants of Health     Financial Resource Strain: Not on file   Food Insecurity: Not on file   Transportation Needs: Not on file   Physical Activity: Not on file   Stress: Not on file   Social Connections: Not on file   Intimate Partner Violence: Not on file   Housing Stability: Not on file       Review of Systems   Constitutional: Negative for fever  HENT: Positive for congestion  Respiratory: Positive for cough  Objective:  Vitals:    04/13/22 1349   BP: 152/90   BP Location: Left arm   Patient Position: Sitting   Cuff Size: Adult   Pulse: (!) 114   Resp: 18   Temp: 98 6 °F (37 °C)   TempSrc: Tympanic   SpO2: 97%   Weight: 86 5 kg (190 lb 9 6 oz)   Height: 5' 7" (1 702 m)     Body mass index is 29 85 kg/m²  Physical Exam  Vitals and nursing note reviewed  Constitutional:       Appearance: He is well-developed  He is not diaphoretic     HENT:      Right Ear: External ear normal       Left Ear: External ear normal    Eyes: Conjunctiva/sclera: Conjunctivae normal    Cardiovascular:      Rate and Rhythm: Normal rate and regular rhythm  Heart sounds: Normal heart sounds  Pulmonary:      Effort: Pulmonary effort is normal  No respiratory distress  Breath sounds: Normal breath sounds  No wheezing, rhonchi or rales  Comments:   Dry cough  Lymphadenopathy:      Cervical: No cervical adenopathy  Neurological:      Mental Status: He is alert and oriented to person, place, and time  RESULTS:    No results found for this or any previous visit (from the past 1008 hour(s))  This note was created with voice recognition software  Phonic, grammatical and spelling errors may be present within the note as a result

## 2022-04-22 ENCOUNTER — TELEPHONE (OUTPATIENT)
Dept: INTERNAL MEDICINE CLINIC | Facility: CLINIC | Age: 82
End: 2022-04-22

## 2022-04-22 DIAGNOSIS — J20.9 ACUTE BRONCHITIS, UNSPECIFIED ORGANISM: Primary | ICD-10-CM

## 2022-04-22 RX ORDER — ALBUTEROL SULFATE 90 UG/1
2 AEROSOL, METERED RESPIRATORY (INHALATION) EVERY 6 HOURS PRN
Qty: 18 G | Refills: 0 | Status: SHIPPED | OUTPATIENT
Start: 2022-04-22 | End: 2022-08-02 | Stop reason: SDUPTHER

## 2022-04-22 RX ORDER — LEVOFLOXACIN 500 MG/1
500 TABLET, FILM COATED ORAL EVERY 24 HOURS
Qty: 7 TABLET | Refills: 0 | Status: SHIPPED | OUTPATIENT
Start: 2022-04-22 | End: 2022-04-29

## 2022-04-22 NOTE — TELEPHONE ENCOUNTER
Pt not better at all    fini anti biotic today (10 days worth) + 2 bottles of robotussin     coughing and just as much mucus as always    Lays upright to sleep  Uses [facility] if something is to be sent or what next for ed?

## 2022-04-29 ENCOUNTER — TELEPHONE (OUTPATIENT)
Dept: INTERNAL MEDICINE CLINIC | Facility: CLINIC | Age: 82
End: 2022-04-29

## 2022-04-29 NOTE — TELEPHONE ENCOUNTER
Pt asking if he can try a vit supplament   Quercetine w/ zinc       Can he take this with high cohesterol and hx of meds hes on?

## 2022-05-09 ENCOUNTER — OFFICE VISIT (OUTPATIENT)
Dept: INTERNAL MEDICINE CLINIC | Facility: CLINIC | Age: 82
End: 2022-05-09
Payer: MEDICARE

## 2022-05-09 ENCOUNTER — HOSPITAL ENCOUNTER (OUTPATIENT)
Dept: RADIOLOGY | Facility: HOSPITAL | Age: 82
Discharge: HOME | End: 2022-05-09
Payer: MEDICARE

## 2022-05-09 VITALS
TEMPERATURE: 98.6 F | SYSTOLIC BLOOD PRESSURE: 130 MMHG | OXYGEN SATURATION: 93 % | HEART RATE: 125 BPM | DIASTOLIC BLOOD PRESSURE: 82 MMHG | HEIGHT: 67 IN | WEIGHT: 186 LBS | RESPIRATION RATE: 16 BRPM | BODY MASS INDEX: 29.19 KG/M2

## 2022-05-09 DIAGNOSIS — R05.3 CHRONIC COUGH: Primary | ICD-10-CM

## 2022-05-09 DIAGNOSIS — R05.3 CHRONIC COUGH: ICD-10-CM

## 2022-05-09 PROCEDURE — 99214 OFFICE O/P EST MOD 30 MIN: CPT | Performed by: INTERNAL MEDICINE

## 2022-05-09 PROCEDURE — 71046 X-RAY EXAM CHEST 2 VIEWS: CPT

## 2022-05-09 NOTE — PROGRESS NOTES
Assessment/Plan:       Agree with continuing the nasal spray  Suggested also using albuterol  Will send him for a chest x-ray and make further recommendations from there  Quality Measures:       No follow-ups on file  No problem-specific Assessment & Plan notes found for this encounter  Diagnoses and all orders for this visit:    Chronic cough  -     XR chest pa & lateral; Future        Subjective:      Patient ID: Herman Kirk is a 80 y o  male  Patient comes in today because his cough has persisted  He has had 2 rounds of antibiotics, steroids, albuterol inhaler  Still coughing  But it is clear  No fevers  No shortness of breath  Saturday, he just started using a nasal steroid inhaler        ALLERGIES:  No Known Allergies    CURRENT MEDICATIONS:    Current Outpatient Medications:     albuterol (ProAir HFA) 90 mcg/act inhaler, Inhale 2 puffs every 6 (six) hours as needed for wheezing, Disp: 18 g, Rfl: 0    amLODIPine (NORVASC) 2 5 mg tablet, TAKE 1 TABLET BY MOUTH  DAILY, Disp: 90 tablet, Rfl: 3    aspirin 81 mg chewable tablet, Chew 81 mg daily, Disp: , Rfl:     atenolol (TENORMIN) 50 mg tablet, Take 1 5 tablets (75 mg total) by mouth 2 (two) times a day, Disp: 60 tablet, Rfl:     atorvastatin (LIPITOR) 40 mg tablet, Take 1 tablet (40 mg total) by mouth daily at bedtime, Disp: 90 tablet, Rfl: 1    Diclofenac Sodium (VOLTAREN) 1 %, , Disp: , Rfl:     FLUoxetine (PROzac) 20 mg capsule, TAKE 1 CAPSULE BY MOUTH  DAILY, Disp: 90 capsule, Rfl: 3    ACTIVE PROBLEM LIST:  Patient Active Problem List   Diagnosis    Benign essential hypertension    Hyperlipidemia    Glaucoma    Anxiety disorder    Atrial fibrillation (HCC)       PAST MEDICAL HISTORY:  Past Medical History:   Diagnosis Date    Retinal hemorrhage, left     last assessed: 9/2/2015       PAST SURGICAL HISTORY:  Past Surgical History:   Procedure Laterality Date    LITHOTRIPSY         FAMILY HISTORY:  No family history on file  SOCIAL HISTORY:  Social History     Socioeconomic History    Marital status: /Civil Union     Spouse name: Not on file    Number of children: Not on file    Years of education: Not on file    Highest education level: Not on file   Occupational History    Occupation: retired   Tobacco Use    Smoking status: Former Smoker    Smokeless tobacco: Never Used   Substance and Sexual Activity    Alcohol use: Not on file    Drug use: No    Sexual activity: Not on file     Comment: denied: histroy of high risk sexual behavior   Other Topics Concern    Not on file   Social History Narrative    Not on file     Social Determinants of Health     Financial Resource Strain: Not on file   Food Insecurity: Not on file   Transportation Needs: Not on file   Physical Activity: Not on file   Stress: Not on file   Social Connections: Not on file   Intimate Partner Violence: Not on file   Housing Stability: Not on file       Review of Systems   Respiratory: Positive for cough  Negative for shortness of breath  Cardiovascular: Negative for chest pain  Gastrointestinal: Negative for abdominal pain  Objective:  Vitals:    05/09/22 0946   BP: 130/82   BP Location: Left arm   Patient Position: Sitting   Cuff Size: Standard   Pulse: (!) 125   Resp: 16   Temp: 98 6 °F (37 °C)   TempSrc: Tympanic   SpO2: 93%   Weight: 84 4 kg (186 lb)   Height: 5' 7" (1 702 m)     Body mass index is 29 13 kg/m²  Physical Exam  Vitals and nursing note reviewed  Constitutional:       Appearance: He is well-developed  Cardiovascular:      Rate and Rhythm: Normal rate and regular rhythm  Heart sounds: Normal heart sounds  Pulmonary:      Effort: Pulmonary effort is normal       Breath sounds: Normal breath sounds  Neurological:      Mental Status: He is alert  RESULTS:    No results found for this or any previous visit (from the past 1008 hour(s))      This note was created with voice recognition software  Phonic, grammatical and spelling errors may be present within the note as a result

## 2022-05-12 ENCOUNTER — TELEPHONE (OUTPATIENT)
Dept: INTERNAL MEDICINE CLINIC | Facility: CLINIC | Age: 82
End: 2022-05-12

## 2022-05-12 DIAGNOSIS — R05.3 CHRONIC COUGH: Primary | ICD-10-CM

## 2022-05-12 RX ORDER — BENZONATATE 100 MG/1
100 CAPSULE ORAL 3 TIMES DAILY PRN
Qty: 30 CAPSULE | Refills: 1 | Status: SHIPPED | OUTPATIENT
Start: 2022-05-12 | End: 2022-08-02 | Stop reason: ALTCHOICE

## 2022-05-12 NOTE — TELEPHONE ENCOUNTER
----- Message from Deja Patel LPN sent at 3/85/1694  2:12 PM EDT -----  Noted  The patient wants to know if tessalon perles or something could be sent in to [place]? He said the Robitussin is messing with his taste  Thanks!

## 2022-05-12 NOTE — TELEPHONE ENCOUNTER
Pt called asking about chest xray, ( still not back) but he also asked about stevie having a cough med pill ,  Maybe tessolon pearls? He cant do the robotussin syrups anymore, messing with his taste of things       Uses [facility]

## 2022-05-15 DIAGNOSIS — E78.2 MIXED HYPERLIPIDEMIA: ICD-10-CM

## 2022-05-16 RX ORDER — ATORVASTATIN CALCIUM 40 MG/1
TABLET, FILM COATED ORAL
Qty: 90 TABLET | Refills: 3 | Status: SHIPPED | OUTPATIENT
Start: 2022-05-16

## 2022-07-13 ENCOUNTER — TELEPHONE (OUTPATIENT)
Dept: INTERNAL MEDICINE CLINIC | Facility: CLINIC | Age: 82
End: 2022-07-13

## 2022-07-13 NOTE — TELEPHONE ENCOUNTER
PHILLY: Patient was here for his appt almost an hour early and said to let Dr Susie John and [facility] Hall know that he has been a patient for over 20 years and they couldn't squeeze in him early for what would have taken 15 minutes  He said that he has had it up to here with this place! (Putting his hand to his throat)    When patient checked in I told him I wasn't sure that they would be able to get seen any sooner and did he want to reschedule his appt  Patient said no

## 2022-08-02 ENCOUNTER — OFFICE VISIT (OUTPATIENT)
Dept: INTERNAL MEDICINE CLINIC | Facility: CLINIC | Age: 82
End: 2022-08-02
Payer: MEDICARE

## 2022-08-02 VITALS
BODY MASS INDEX: 28.94 KG/M2 | HEART RATE: 80 BPM | WEIGHT: 184.4 LBS | SYSTOLIC BLOOD PRESSURE: 146 MMHG | DIASTOLIC BLOOD PRESSURE: 84 MMHG | TEMPERATURE: 96.8 F | RESPIRATION RATE: 18 BRPM | OXYGEN SATURATION: 98 % | HEIGHT: 67 IN

## 2022-08-02 DIAGNOSIS — J30.9 ALLERGIC RHINITIS, UNSPECIFIED SEASONALITY, UNSPECIFIED TRIGGER: ICD-10-CM

## 2022-08-02 DIAGNOSIS — J30.9 ALLERGIC RHINITIS, UNSPECIFIED SEASONALITY, UNSPECIFIED TRIGGER: Primary | ICD-10-CM

## 2022-08-02 DIAGNOSIS — J20.9 ACUTE BRONCHITIS, UNSPECIFIED ORGANISM: ICD-10-CM

## 2022-08-02 PROCEDURE — 99213 OFFICE O/P EST LOW 20 MIN: CPT | Performed by: PHYSICIAN ASSISTANT

## 2022-08-02 RX ORDER — MONTELUKAST SODIUM 10 MG/1
TABLET ORAL
Qty: 90 TABLET | Refills: 1 | Status: SHIPPED | OUTPATIENT
Start: 2022-08-02 | End: 2022-10-05 | Stop reason: ALTCHOICE

## 2022-08-02 RX ORDER — FLUTICASONE PROPIONATE 50 MCG
1 SPRAY, SUSPENSION (ML) NASAL 2 TIMES DAILY
Qty: 16 G | Refills: 3 | Status: SHIPPED | OUTPATIENT
Start: 2022-08-02 | End: 2022-08-02

## 2022-08-02 RX ORDER — MONTELUKAST SODIUM 10 MG/1
10 TABLET ORAL
Qty: 30 TABLET | Refills: 5 | Status: SHIPPED | OUTPATIENT
Start: 2022-08-02 | End: 2022-08-02

## 2022-08-02 RX ORDER — ALBUTEROL SULFATE 90 UG/1
2 AEROSOL, METERED RESPIRATORY (INHALATION) EVERY 6 HOURS PRN
Qty: 18 G | Refills: 1 | Status: SHIPPED | OUTPATIENT
Start: 2022-08-02 | End: 2022-10-05 | Stop reason: ALTCHOICE

## 2022-08-02 RX ORDER — FLUTICASONE PROPIONATE 50 MCG
SPRAY, SUSPENSION (ML) NASAL
Qty: 48 G | Refills: 1 | Status: SHIPPED | OUTPATIENT
Start: 2022-08-02 | End: 2022-09-07 | Stop reason: ALTCHOICE

## 2022-08-02 NOTE — PROGRESS NOTES
Assessment/Plan:   Patient Instructions   Start Allegra 180 mg 1 daily in the morning  Start fluticasone ( Flonase) 1 spray each nostril twice daily as demonstrated and instructed  Start montelukast ( Singulair) 10 mg 1 at bedtime  This will make you tired  These medications will take a little time to work but will be effective for what is going on  Use inhaler 2 puffs at bedtime  Schedule follow-up in 1 month to reassess effectiveness, and discuss memory, and possible change from Prozac  Quality Measures:   Depression Screening and Follow-up Plan: Patient was screened for depression during today's encounter  They screened negative with a PHQ-2 score of 0  Return in about 4 weeks (around 8/30/2022) for Recheck  Diagnoses and all orders for this visit:    Allergic rhinitis, unspecified seasonality, unspecified trigger  -     fluticasone (FLONASE) 50 mcg/act nasal spray; 1 spray into each nostril 2 (two) times a day  -     montelukast (SINGULAIR) 10 mg tablet; Take 1 tablet (10 mg total) by mouth daily at bedtime    Acute bronchitis, unspecified organism  -     albuterol (ProAir HFA) 90 mcg/act inhaler; Inhale 2 puffs every 6 (six) hours as needed for wheezing          Subjective:      Patient ID: Tiffanie Macario is a 80 y o  male  Acute visit follow-up    Patient states that for over 4 weeks he has had problems with a persistent tight wheezy cough  Worse at night to the point where it sounds as if he has a Cat lying on his chest   He notes a constant runny nose, admits postnasal drainage, but very little sneezing  Eyes are watery at times  He states he has been treated with several courses of antibiotics along with a steroid pack, albuterol inhaler, all which have not been significantly helpful  He recently started taking an antihistamine which he believes is slightly helpful  Previous chest x-ray is negative      Patient was inform me also that his wife thinks at times his memory is poor  Patient also concerned because he thinks his Prozac is not effective  His temper has been short  He would like to consider changing the medication  ALLERGIES:  No Known Allergies    CURRENT MEDICATIONS:    Current Outpatient Medications:     albuterol (ProAir HFA) 90 mcg/act inhaler, Inhale 2 puffs every 6 (six) hours as needed for wheezing, Disp: 18 g, Rfl: 1    amLODIPine (NORVASC) 2 5 mg tablet, TAKE 1 TABLET BY MOUTH  DAILY, Disp: 90 tablet, Rfl: 3    aspirin 81 mg chewable tablet, Chew 81 mg daily, Disp: , Rfl:     atenolol (TENORMIN) 50 mg tablet, Take 1 5 tablets (75 mg total) by mouth 2 (two) times a day, Disp: 60 tablet, Rfl:     atorvastatin (LIPITOR) 40 mg tablet, TAKE 1 TABLET BY MOUTH  DAILY AT BEDTIME, Disp: 90 tablet, Rfl: 3    Diclofenac Sodium (VOLTAREN) 1 %, , Disp: , Rfl:     FLUoxetine (PROzac) 20 mg capsule, TAKE 1 CAPSULE BY MOUTH  DAILY, Disp: 90 capsule, Rfl: 3    fluticasone (FLONASE) 50 mcg/act nasal spray, 1 spray into each nostril 2 (two) times a day, Disp: 16 g, Rfl: 3    montelukast (SINGULAIR) 10 mg tablet, Take 1 tablet (10 mg total) by mouth daily at bedtime, Disp: 30 tablet, Rfl: 5    ACTIVE PROBLEM LIST:  Patient Active Problem List   Diagnosis    Benign essential hypertension    Hyperlipidemia    Glaucoma    Anxiety disorder    Atrial fibrillation (HCC)       PAST MEDICAL HISTORY:  Past Medical History:   Diagnosis Date    Retinal hemorrhage, left     last assessed: 9/2/2015       PAST SURGICAL HISTORY:  Past Surgical History:   Procedure Laterality Date    LITHOTRIPSY         FAMILY HISTORY:  History reviewed  No pertinent family history      SOCIAL HISTORY:  Social History     Socioeconomic History    Marital status: /Civil Union     Spouse name: Not on file    Number of children: Not on file    Years of education: Not on file    Highest education level: Not on file   Occupational History    Occupation: retired   Tobacco Use  Smoking status: Former Smoker    Smokeless tobacco: Never Used   Substance and Sexual Activity    Alcohol use: Not on file    Drug use: No    Sexual activity: Not on file     Comment: denied: histroy of high risk sexual behavior   Other Topics Concern    Not on file   Social History Narrative    Not on file     Social Determinants of Health     Financial Resource Strain: Not on file   Food Insecurity: Not on file   Transportation Needs: Not on file   Physical Activity: Not on file   Stress: Not on file   Social Connections: Not on file   Intimate Partner Violence: Not on file   Housing Stability: Not on file       Review of Systems   Constitutional: Negative for activity change, chills, fatigue and fever  HENT: Positive for congestion, postnasal drip and rhinorrhea  Negative for ear pain, sinus pressure, sneezing, sore throat and voice change  Eyes: Negative for discharge, redness and itching  Respiratory: Positive for cough and wheezing  Negative for chest tightness and shortness of breath  Cardiovascular: Negative for chest pain, palpitations and leg swelling  Gastrointestinal: Negative for abdominal pain, blood in stool, constipation, diarrhea, nausea and vomiting  Endocrine: Negative for polydipsia, polyphagia and polyuria  Genitourinary: Negative for difficulty urinating  Musculoskeletal: Negative for arthralgias and myalgias  Skin: Negative for rash  Allergic/Immunologic: Negative for immunocompromised state  Neurological: Negative for dizziness, syncope, weakness, light-headedness and headaches  Hematological: Negative for adenopathy  Does not bruise/bleed easily  Psychiatric/Behavioral: Positive for sleep disturbance (  Secondary to coughing)  Negative for dysphoric mood and suicidal ideas  The patient is not nervous/anxious            Objective:  Vitals:    08/02/22 1021   BP: 146/84   BP Location: Right arm   Patient Position: Sitting   Cuff Size: Adult   Pulse: (!) 126 Resp: 18   Temp: (!) 96 8 °F (36 °C)   TempSrc: Tympanic   SpO2: 98%   Weight: 83 6 kg (184 lb 6 4 oz)   Height: 5' 7" (1 702 m)     Body mass index is 28 88 kg/m²  Physical Exam  Vitals and nursing note reviewed  Constitutional:       General: He is not in acute distress  Appearance: He is well-developed  HENT:      Head: Normocephalic and atraumatic  Comments: HEENT-granular, injected conjunctivae, TM's with fluid behind,nasal mucosa pale and boggy with clear/white mucoid drainage, granular soft palate, posterior pharynx with clear/white post nasal drainage, no complaint of sinus tenderness  Eyes:      Extraocular Movements: Extraocular movements intact  Pupils: Pupils are equal, round, and reactive to light  Neck:      Thyroid: No thyromegaly  Vascular: No carotid bruit or JVD  Cardiovascular:      Rate and Rhythm: Normal rate  Rhythm irregularly irregular  Heart sounds: Normal heart sounds  Pulmonary:      Effort: Pulmonary effort is normal  No respiratory distress  Comments: Late inspiratory rhonchi present in the anterior fields with expiratory wheezing present  Forced expiration causes wheezing and significant coughing  Musculoskeletal:      Cervical back: Neck supple  Right lower leg: No edema  Left lower leg: No edema  Lymphadenopathy:      Cervical: No cervical adenopathy  Skin:     General: Skin is warm and dry  Findings: No rash  Neurological:      General: No focal deficit present  Mental Status: He is alert and oriented to person, place, and time  Mental status is at baseline  Psychiatric:         Mood and Affect: Mood normal          Behavior: Behavior normal            RESULTS:    No results found for this or any previous visit (from the past 1008 hour(s))  This note was created with voice recognition software  Phonic, grammatical and spelling errors may be present within the note as a result

## 2022-08-15 ENCOUNTER — TELEPHONE (OUTPATIENT)
Dept: INTERNAL MEDICINE CLINIC | Facility: CLINIC | Age: 82
End: 2022-08-15

## 2022-08-15 NOTE — TELEPHONE ENCOUNTER
The symptoms are not usually a side effect of the medications you started  Sometimes the Allegra ( fexofenadine ) can cause dizziness  If not any better, we should probably see you again  Maybe see with Dr Peterson Glez thinks about this

## 2022-08-15 NOTE — TELEPHONE ENCOUNTER
Pt had a dizzyness with clammy sweats episode ,  Can this be side effects on any new meds he was put on 2 weeks back?      This happened a few hours ago but feels a bit better now     Has a lot of mucus which is the same from appt time

## 2022-08-25 ENCOUNTER — RA CDI HCC (OUTPATIENT)
Dept: OTHER | Facility: HOSPITAL | Age: 82
End: 2022-08-25

## 2022-08-25 NOTE — PROGRESS NOTES
[address]  coding opportunities       Chart reviewed, no opportunity found: CHART REVIEWED, NO OPPORTUNITY FOUND        Patients Insurance     Medicare Insurance: Medicare

## 2022-09-01 ENCOUNTER — TELEPHONE (OUTPATIENT)
Dept: INTERNAL MEDICINE CLINIC | Facility: CLINIC | Age: 82
End: 2022-09-01

## 2022-09-01 DIAGNOSIS — R04.0 EPISTAXIS: Primary | ICD-10-CM

## 2022-09-01 NOTE — TELEPHONE ENCOUNTER
ENT would be the next stop but he can't be actively bleeding when they see him  Otherwise if the nosebleed just won't stop, the ER is the place to go

## 2022-09-01 NOTE — TELEPHONE ENCOUNTER
Spoke with the patient and notified him of this  He verbalized understanding  ENT referral placed for Dr Doug Kelly  Patient was notified that if the bleeding persists to go to the ED, he verbalized understanding

## 2022-09-01 NOTE — TELEPHONE ENCOUNTER
Pt is a still having nose bleeds,  For 3-4 days worth    I suggested e/r for cauterizations,  They are asking if theres other places that do this?   (tried e/r in past with no help sometimes)    Maybe ENT?      Pt takes asprin / fish oil   fyi     Please advise

## 2022-09-07 ENCOUNTER — OFFICE VISIT (OUTPATIENT)
Dept: INTERNAL MEDICINE CLINIC | Facility: CLINIC | Age: 82
End: 2022-09-07
Payer: MEDICARE

## 2022-09-07 VITALS
RESPIRATION RATE: 16 BRPM | HEART RATE: 80 BPM | WEIGHT: 182.6 LBS | OXYGEN SATURATION: 96 % | TEMPERATURE: 99 F | SYSTOLIC BLOOD PRESSURE: 132 MMHG | HEIGHT: 67 IN | BODY MASS INDEX: 28.66 KG/M2 | DIASTOLIC BLOOD PRESSURE: 80 MMHG

## 2022-09-07 DIAGNOSIS — K21.9 GASTROESOPHAGEAL REFLUX DISEASE, UNSPECIFIED WHETHER ESOPHAGITIS PRESENT: ICD-10-CM

## 2022-09-07 DIAGNOSIS — R09.82 POST-NASAL DRIP: ICD-10-CM

## 2022-09-07 DIAGNOSIS — J30.9 ALLERGIC RHINITIS, UNSPECIFIED SEASONALITY, UNSPECIFIED TRIGGER: Primary | ICD-10-CM

## 2022-09-07 PROCEDURE — 99214 OFFICE O/P EST MOD 30 MIN: CPT | Performed by: PHYSICIAN ASSISTANT

## 2022-09-07 RX ORDER — FEXOFENADINE HCL 180 MG/1
180 TABLET ORAL DAILY
COMMUNITY
End: 2022-10-05 | Stop reason: ALTCHOICE

## 2022-09-07 RX ORDER — PANTOPRAZOLE SODIUM 40 MG/1
TABLET, DELAYED RELEASE ORAL
Qty: 90 TABLET | Refills: 0 | Status: SHIPPED | OUTPATIENT
Start: 2022-09-07 | End: 2022-10-05 | Stop reason: ALTCHOICE

## 2022-09-07 RX ORDER — PANTOPRAZOLE SODIUM 40 MG/1
40 TABLET, DELAYED RELEASE ORAL DAILY
Qty: 30 TABLET | Refills: 1 | Status: SHIPPED | OUTPATIENT
Start: 2022-09-07 | End: 2022-09-07

## 2022-09-07 NOTE — PATIENT INSTRUCTIONS
Agree with you seeing ENT later this month  This will be helpful for both nose bleed and overall evaluation  With symptoms of GERD (reflux) which could be the source of your ongoing cough, we will start pantoprazole 40 mg 1 daily in the morning on an empty stomach  We will then get together in 1 month to ascertain effectiveness

## 2022-09-07 NOTE — PROGRESS NOTES
Assessment/Plan:   Patient Instructions   Agree with you seeing ENT later this month  This will be helpful for both nose bleed and overall evaluation  With symptoms of GERD (reflux) which could be the source of your ongoing cough, we will start pantoprazole 40 mg 1 daily in the morning on an empty stomach  We will then get together in 1 month to ascertain effectiveness  Quality Measures:   Depression Screening and Follow-up Plan: Patient was screened for depression during today's encounter  They screened negative with a PHQ-2 score of 0  Return in about 4 weeks (around 10/5/2022) for Recheck  Diagnoses and all orders for this visit:    Allergic rhinitis, unspecified seasonality, unspecified trigger    Gastroesophageal reflux disease, unspecified whether esophagitis present  -     pantoprazole (PROTONIX) 40 mg tablet; Take 1 tablet (40 mg total) by mouth daily    Post-nasal drip    Other orders  -     fexofenadine (ALLEGRA) 180 MG tablet; Take 180 mg by mouth daily        Subjective:      Patient ID: Kamila Calderon is a 80 y o  male  Follow-up    At last visit patient had presented with prolonged symptoms suggestive of allergies with postnasal drip causing cough  Since that time he has used Allegra daily, montelukast at bedtime, and Flonase nasal spray twice daily  He had complications with the Flonase causing nose bleeds therefore he stopped it  He believes he is a little better however this visit does admit to reflux symptoms that he states have been going on for several months especially after eating tomato paste type foods or spicy foods  He often will awaken at night with the "heartburn"  He will often have to take several Tums to relieve his symptoms        ALLERGIES:  No Known Allergies    CURRENT MEDICATIONS:    Current Outpatient Medications:     albuterol (ProAir HFA) 90 mcg/act inhaler, Inhale 2 puffs every 6 (six) hours as needed for wheezing, Disp: 18 g, Rfl: 1   amLODIPine (NORVASC) 2 5 mg tablet, TAKE 1 TABLET BY MOUTH  DAILY, Disp: 90 tablet, Rfl: 3    atenolol (TENORMIN) 50 mg tablet, Take 1 5 tablets (75 mg total) by mouth 2 (two) times a day, Disp: 60 tablet, Rfl:     atorvastatin (LIPITOR) 40 mg tablet, TAKE 1 TABLET BY MOUTH  DAILY AT BEDTIME, Disp: 90 tablet, Rfl: 3    Diclofenac Sodium (VOLTAREN) 1 %, , Disp: , Rfl:     fexofenadine (ALLEGRA) 180 MG tablet, Take 180 mg by mouth daily, Disp: , Rfl:     FLUoxetine (PROzac) 20 mg capsule, TAKE 1 CAPSULE BY MOUTH  DAILY, Disp: 90 capsule, Rfl: 3    montelukast (SINGULAIR) 10 mg tablet, TAKE 1 TABLET BY MOUTH DAILY AT BEDTIME, Disp: 90 tablet, Rfl: 1    pantoprazole (PROTONIX) 40 mg tablet, Take 1 tablet (40 mg total) by mouth daily, Disp: 30 tablet, Rfl: 1    aspirin 81 mg chewable tablet, Chew 81 mg daily (Patient not taking: Reported on 9/7/2022), Disp: , Rfl:     ACTIVE PROBLEM LIST:  Patient Active Problem List   Diagnosis    Benign essential hypertension    Hyperlipidemia    Glaucoma    Anxiety disorder    Atrial fibrillation (HCC)       PAST MEDICAL HISTORY:  Past Medical History:   Diagnosis Date    Retinal hemorrhage, left     last assessed: 9/2/2015       PAST SURGICAL HISTORY:  Past Surgical History:   Procedure Laterality Date    LITHOTRIPSY         FAMILY HISTORY:  History reviewed  No pertinent family history      SOCIAL HISTORY:  Social History     Socioeconomic History    Marital status: /Civil Union     Spouse name: Not on file    Number of children: Not on file    Years of education: Not on file    Highest education level: Not on file   Occupational History    Occupation: retired   Tobacco Use    Smoking status: Former Smoker    Smokeless tobacco: Never Used   Vaping Use    Vaping Use: Never used   Substance and Sexual Activity    Alcohol use: Never    Drug use: No    Sexual activity: Not Currently     Comment: denied: histroy of high risk sexual behavior   Other Topics Concern    Not on file   Social History Narrative    Not on file     Social Determinants of Health     Financial Resource Strain: Not on file   Food Insecurity: Not on file   Transportation Needs: Not on file   Physical Activity: Not on file   Stress: Not on file   Social Connections: Not on file   Intimate Partner Violence: Not on file   Housing Stability: Not on file       Review of Systems   Constitutional: Negative for activity change, chills, fatigue and fever  HENT: Positive for congestion, postnasal drip and rhinorrhea  Negative for sinus pressure, sinus pain, sneezing, sore throat and trouble swallowing  Eyes: Negative for discharge, redness and itching  Respiratory: Positive for cough  Negative for chest tightness, shortness of breath and wheezing  Cardiovascular: Negative for chest pain, palpitations and leg swelling  Gastrointestinal: Negative for abdominal pain, blood in stool, constipation, diarrhea, nausea and vomiting  As per HPI   Genitourinary: Negative for difficulty urinating  Musculoskeletal: Negative for arthralgias and myalgias  Skin: Negative for rash  Allergic/Immunologic: Negative for immunocompromised state  Neurological: Negative for dizziness, syncope, weakness, light-headedness and headaches  Hematological: Negative for adenopathy  Does not bruise/bleed easily  Psychiatric/Behavioral: Positive for sleep disturbance (  Secondary to cough)  Negative for dysphoric mood  The patient is not nervous/anxious  Objective:  Vitals:    09/07/22 1436 09/07/22 1458   BP: 132/80    BP Location: Left arm    Patient Position: Sitting    Cuff Size: Adult    Pulse: (!) 108 80   Resp: 16    Temp: 99 °F (37 2 °C)    TempSrc: Tympanic    SpO2: 96%    Weight: 82 8 kg (182 lb 9 6 oz)    Height: 5' 7" (1 702 m)      Body mass index is 28 6 kg/m²  Physical Exam  Vitals and nursing note reviewed  Constitutional:       General: He is not in acute distress  Appearance: He is well-developed  He is not ill-appearing  HENT:      Head: Normocephalic and atraumatic  Nose: Nose normal       Comments: Cannot find the area of the nose bleed in right naris  Suspect posterior bleed     Mouth/Throat:      Mouth: Mucous membranes are moist    Eyes:      Extraocular Movements: Extraocular movements intact  Conjunctiva/sclera: Conjunctivae normal       Pupils: Pupils are equal, round, and reactive to light  Neck:      Thyroid: No thyromegaly  Vascular: No carotid bruit or JVD  Cardiovascular:      Rate and Rhythm: Normal rate and regular rhythm  Heart sounds: Normal heart sounds  Pulmonary:      Effort: Pulmonary effort is normal  No respiratory distress  Comments: Crackles present right base  Previous chest x-ray was negative  Abdominal:      Tenderness: There is abdominal tenderness ( epigastrium)  Musculoskeletal:      Cervical back: Neck supple  Right lower leg: No edema  Left lower leg: No edema  Lymphadenopathy:      Cervical: No cervical adenopathy  Skin:     General: Skin is warm and dry  Findings: No rash  Neurological:      General: No focal deficit present  Mental Status: He is alert and oriented to person, place, and time  Mental status is at baseline  Psychiatric:         Mood and Affect: Mood normal          Behavior: Behavior normal            RESULTS:    No results found for this or any previous visit (from the past 1008 hour(s))  This note was created with voice recognition software  Phonic, grammatical and spelling errors may be present within the note as a result

## 2022-09-26 ENCOUNTER — APPOINTMENT (OUTPATIENT)
Dept: LAB | Facility: CLINIC | Age: 82
End: 2022-09-26
Payer: MEDICARE

## 2022-09-26 DIAGNOSIS — E78.2 MIXED HYPERLIPIDEMIA: ICD-10-CM

## 2022-09-26 LAB
ALBUMIN SERPL BCP-MCNC: 3.1 G/DL (ref 3.5–5)
ALP SERPL-CCNC: 128 U/L (ref 46–116)
ALT SERPL W P-5'-P-CCNC: 17 U/L (ref 12–78)
ANION GAP SERPL CALCULATED.3IONS-SCNC: 7 MMOL/L (ref 4–13)
AST SERPL W P-5'-P-CCNC: 13 U/L (ref 5–45)
BILIRUB SERPL-MCNC: 0.44 MG/DL (ref 0.2–1)
BUN SERPL-MCNC: 23 MG/DL (ref 5–25)
CALCIUM ALBUM COR SERPL-MCNC: 9.8 MG/DL (ref 8.3–10.1)
CALCIUM SERPL-MCNC: 9.1 MG/DL (ref 8.3–10.1)
CHLORIDE SERPL-SCNC: 111 MMOL/L (ref 96–108)
CHOLEST SERPL-MCNC: 137 MG/DL
CO2 SERPL-SCNC: 24 MMOL/L (ref 21–32)
CREAT SERPL-MCNC: 1.34 MG/DL (ref 0.6–1.3)
GFR SERPL CREATININE-BSD FRML MDRD: 48 ML/MIN/1.73SQ M
GLUCOSE P FAST SERPL-MCNC: 96 MG/DL (ref 65–99)
HDLC SERPL-MCNC: 50 MG/DL
LDLC SERPL CALC-MCNC: 61 MG/DL (ref 0–100)
NONHDLC SERPL-MCNC: 87 MG/DL
POTASSIUM SERPL-SCNC: 4.6 MMOL/L (ref 3.5–5.3)
PROT SERPL-MCNC: 6.8 G/DL (ref 6.4–8.4)
SODIUM SERPL-SCNC: 142 MMOL/L (ref 135–147)
TRIGL SERPL-MCNC: 132 MG/DL

## 2022-09-26 PROCEDURE — 80053 COMPREHEN METABOLIC PANEL: CPT

## 2022-09-26 PROCEDURE — 80061 LIPID PANEL: CPT

## 2022-09-26 PROCEDURE — 36415 COLL VENOUS BLD VENIPUNCTURE: CPT

## 2022-10-05 ENCOUNTER — OFFICE VISIT (OUTPATIENT)
Dept: INTERNAL MEDICINE CLINIC | Facility: CLINIC | Age: 82
End: 2022-10-05
Payer: MEDICARE

## 2022-10-05 VITALS
WEIGHT: 182 LBS | SYSTOLIC BLOOD PRESSURE: 124 MMHG | DIASTOLIC BLOOD PRESSURE: 80 MMHG | OXYGEN SATURATION: 96 % | HEIGHT: 67 IN | TEMPERATURE: 98.1 F | HEART RATE: 116 BPM | BODY MASS INDEX: 28.56 KG/M2

## 2022-10-05 DIAGNOSIS — J32.9 CHRONIC SINUSITIS, UNSPECIFIED LOCATION: Primary | ICD-10-CM

## 2022-10-05 DIAGNOSIS — R05.3 CHRONIC COUGH: ICD-10-CM

## 2022-10-05 DIAGNOSIS — E78.2 MIXED HYPERLIPIDEMIA: ICD-10-CM

## 2022-10-05 DIAGNOSIS — R09.82 POST-NASAL DRIP: ICD-10-CM

## 2022-10-05 DIAGNOSIS — I10 BENIGN ESSENTIAL HYPERTENSION: ICD-10-CM

## 2022-10-05 PROCEDURE — 99214 OFFICE O/P EST MOD 30 MIN: CPT | Performed by: PHYSICIAN ASSISTANT

## 2022-10-05 NOTE — PROGRESS NOTES
Assessment/Plan:   Patient Instructions   Chronic cough, chronic postnasal drip, chronic sinus symptoms that have not responded to several courses of antibiotic, and treatment for allergies  Will ask for CT of sinuses, then review with patient when complete  Will schedule follow-up in 4 months with repeat labs for that visit, sooner as needed  Use triple antibiotic ointment inside your nose  Quality Measures:   Depression Screening and Follow-up Plan: Patient was screened for depression during today's encounter  They screened negative with a PHQ-2 score of 0  Return in about 4 months (around 2/5/2023) for Regular Visit + Medicare Annual Wellness  Diagnoses and all orders for this visit:    Chronic sinusitis, unspecified location  -     CT sinus wo contrast; Future    Post-nasal drip  -     CT sinus wo contrast; Future    Chronic cough  -     CT sinus wo contrast; Future    Benign essential hypertension  -     CBC and differential; Future  -     Comprehensive metabolic panel; Future    Mixed hyperlipidemia  -     Lipid panel; Future        Subjective:      Patient ID: Felicity Shaikh is a 80 y o  male  Follow-up, labs reviewed with patient    Since last visit patient did see ENT for ongoing intermittent nose bleeds  ENT note reviewed  Bleeder was cauterized  He has not had any bleeding since  Little soreness of the nose  Also patient for over 4 months has been treated with several courses of antibiotic, nasal sprays, allergy medications, none of which have helped his ongoing cough, postnasal drip and nasal congestion  He was also treated with a course of PPI for suspected GERD and this did not help  Discussed CT scanning and despite patient being claustrophobic and nervous he will try to have this done  Hypertension:  Good control on his current medication  [place] cp, palp, sob, edema, HA, dizziness, diaphoresis, syncope, visual disturbance      Hyperlipidemia:  Since his atorvastatin was increased, good control  On atorvastatin 40 mg daily  Tolerating without difficulty  Continue current dose  ALLERGIES:  No Known Allergies    CURRENT MEDICATIONS:    Current Outpatient Medications:     amLODIPine (NORVASC) 2 5 mg tablet, TAKE 1 TABLET BY MOUTH  DAILY, Disp: 90 tablet, Rfl: 3    atenolol (TENORMIN) 50 mg tablet, Take 1 5 tablets (75 mg total) by mouth 2 (two) times a day, Disp: 60 tablet, Rfl:     atorvastatin (LIPITOR) 40 mg tablet, TAKE 1 TABLET BY MOUTH  DAILY AT BEDTIME, Disp: 90 tablet, Rfl: 3    Diclofenac Sodium (VOLTAREN) 1 %, , Disp: , Rfl:     FLUoxetine (PROzac) 20 mg capsule, TAKE 1 CAPSULE BY MOUTH  DAILY, Disp: 90 capsule, Rfl: 3    ACTIVE PROBLEM LIST:  Patient Active Problem List   Diagnosis    Benign essential hypertension    Hyperlipidemia    Glaucoma    Anxiety disorder    Atrial fibrillation (HCC)       PAST MEDICAL HISTORY:  Past Medical History:   Diagnosis Date    Nosebleed     Retinal hemorrhage, left     last assessed: 9/2/2015       PAST SURGICAL HISTORY:  Past Surgical History:   Procedure Laterality Date    LITHOTRIPSY      TONSILLECTOMY         FAMILY HISTORY:  History reviewed  No pertinent family history      SOCIAL HISTORY:  Social History     Socioeconomic History    Marital status: /Civil Union     Spouse name: Not on file    Number of children: Not on file    Years of education: Not on file    Highest education level: Not on file   Occupational History    Occupation: retired   Tobacco Use    Smoking status: Former Smoker    Smokeless tobacco: Never Used   Vaping Use    Vaping Use: Never used   Substance and Sexual Activity    Alcohol use: Never    Drug use: No    Sexual activity: Not Currently     Comment: denied: histroy of high risk sexual behavior   Other Topics Concern    Not on file   Social History Narrative    Not on file     Social Determinants of Health     Financial Resource Strain: Not on file Food Insecurity: Not on file   Transportation Needs: Not on file   Physical Activity: Not on file   Stress: Not on file   Social Connections: Not on file   Intimate Partner Violence: Not on file   Housing Stability: Not on file       Review of Systems   Constitutional: Negative for activity change, chills, fatigue and fever  HENT: Positive for congestion, postnasal drip, rhinorrhea, sinus pressure and sneezing  Eyes: Negative for discharge, redness and itching  Respiratory: Positive for cough and wheezing  Negative for chest tightness and shortness of breath  Cardiovascular: Negative for chest pain, palpitations and leg swelling  Gastrointestinal: Negative for abdominal pain  Genitourinary: Negative for difficulty urinating  Musculoskeletal: Negative for arthralgias and myalgias  Skin: Negative for rash  Allergic/Immunologic: Negative for immunocompromised state  Neurological: Negative for dizziness, syncope, weakness, light-headedness and headaches  Hematological: Negative for adenopathy  Does not bruise/bleed easily  Psychiatric/Behavioral: Negative for dysphoric mood  The patient is not nervous/anxious  Objective:  Vitals:    10/05/22 1446   BP: 124/80   BP Location: Left arm   Patient Position: Sitting   Cuff Size: Large   Pulse: (!) 116   Temp: 98 1 °F (36 7 °C)   TempSrc: Tympanic   SpO2: 96%   Weight: 82 6 kg (182 lb)   Height: 5' 7" (1 702 m)     Body mass index is 28 51 kg/m²  Physical Exam  Vitals and nursing note reviewed  Constitutional:       General: He is not in acute distress  Appearance: He is well-developed  HENT:      Head: Normocephalic and atraumatic  Comments: HEENT-injected conjunctivae, TMs dull with fluid behind, nasal mucosa hyperemic with yellow/green mucoid drainage, injected red posterior pharynx with yellow/green post nasal drainage, tender maxillary sinus region  Eyes:      Extraocular Movements: Extraocular movements intact  Pupils: Pupils are equal, round, and reactive to light  Neck:      Thyroid: No thyromegaly  Vascular: No carotid bruit or JVD  Cardiovascular:      Rate and Rhythm: Normal rate and regular rhythm  Heart sounds: Normal heart sounds  Pulmonary:      Effort: Pulmonary effort is normal  No respiratory distress  Breath sounds: Normal breath sounds  Musculoskeletal:      Cervical back: Neck supple  Right lower leg: No edema  Left lower leg: No edema  Lymphadenopathy:      Cervical: No cervical adenopathy  Skin:     General: Skin is warm and dry  Findings: No rash  Neurological:      General: No focal deficit present  Mental Status: He is alert and oriented to person, place, and time  Mental status is at baseline  Psychiatric:         Mood and Affect: Mood normal          Behavior: Behavior normal            RESULTS:    Recent Results (from the past 1008 hour(s))   Comprehensive metabolic panel    Collection Time: 09/26/22 11:10 AM   Result Value Ref Range    Sodium 142 135 - 147 mmol/L    Potassium 4 6 3 5 - 5 3 mmol/L    Chloride 111 (H) 96 - 108 mmol/L    CO2 24 21 - 32 mmol/L    ANION GAP 7 4 - 13 mmol/L    BUN 23 5 - 25 mg/dL    Creatinine 1 34 (H) 0 60 - 1 30 mg/dL    Glucose, Fasting 96 65 - 99 mg/dL    Calcium 9 1 8 3 - 10 1 mg/dL    Corrected Calcium 9 8 8 3 - 10 1 mg/dL    AST 13 5 - 45 U/L    ALT 17 12 - 78 U/L    Alkaline Phosphatase 128 (H) 46 - 116 U/L    Total Protein 6 8 6 4 - 8 4 g/dL    Albumin 3 1 (L) 3 5 - 5 0 g/dL    Total Bilirubin 0 44 0 20 - 1 00 mg/dL    eGFR 48 ml/min/1 73sq m   Lipid panel    Collection Time: 09/26/22 11:10 AM   Result Value Ref Range    Cholesterol 137 See Comment mg/dL    Triglycerides 132 See Comment mg/dL    HDL, Direct 50 >=40 mg/dL    LDL Calculated 61 0 - 100 mg/dL    Non-HDL-Chol (CHOL-HDL) 87 mg/dl       This note was created with voice recognition software    Phonic, grammatical and spelling errors may be present within the note as a result

## 2022-10-05 NOTE — PATIENT INSTRUCTIONS
Chronic cough, chronic postnasal drip, chronic sinus symptoms that have not responded to several courses of antibiotic, and treatment for allergies  Will ask for CT of sinuses, then review with patient when complete  Will schedule follow-up in 4 months with repeat labs for that visit, sooner as needed  Use triple antibiotic ointment inside your nose

## 2022-10-14 ENCOUNTER — HOSPITAL ENCOUNTER (OUTPATIENT)
Dept: CT IMAGING | Facility: CLINIC | Age: 82
Discharge: HOME | End: 2022-10-14
Payer: MEDICARE

## 2022-10-14 DIAGNOSIS — J32.9 CHRONIC SINUSITIS, UNSPECIFIED LOCATION: ICD-10-CM

## 2022-10-14 DIAGNOSIS — R09.82 POST-NASAL DRIP: ICD-10-CM

## 2022-10-14 DIAGNOSIS — R05.3 CHRONIC COUGH: ICD-10-CM

## 2022-10-14 PROCEDURE — G1004 CDSM NDSC: HCPCS

## 2022-10-14 PROCEDURE — 70486 CT MAXILLOFACIAL W/O DYE: CPT

## 2022-10-20 ENCOUNTER — TELEPHONE (OUTPATIENT)
Dept: INTERNAL MEDICINE CLINIC | Facility: CLINIC | Age: 82
End: 2022-10-20

## 2022-10-20 NOTE — TELEPHONE ENCOUNTER
Pt called on his ct sinus result and how concerned he should be on the nasal cyst there?      Please reach back at [phone]

## 2022-10-21 NOTE — TELEPHONE ENCOUNTER
FYI  Patient called and had a few questions  Read message that is under imaging to him  He was satisfied with that and did make an appt with an ENT

## 2022-12-30 ENCOUNTER — OFFICE VISIT (OUTPATIENT)
Dept: INTERNAL MEDICINE CLINIC | Facility: CLINIC | Age: 82
End: 2022-12-30

## 2022-12-30 ENCOUNTER — HOSPITAL ENCOUNTER (OUTPATIENT)
Dept: RADIOLOGY | Facility: HOSPITAL | Age: 82
End: 2022-12-30

## 2022-12-30 VITALS
RESPIRATION RATE: 14 BRPM | SYSTOLIC BLOOD PRESSURE: 136 MMHG | HEIGHT: 67 IN | WEIGHT: 180 LBS | DIASTOLIC BLOOD PRESSURE: 80 MMHG | HEART RATE: 116 BPM | BODY MASS INDEX: 28.25 KG/M2 | OXYGEN SATURATION: 98 %

## 2022-12-30 DIAGNOSIS — R05.1 ACUTE COUGH: ICD-10-CM

## 2022-12-30 DIAGNOSIS — R05.1 ACUTE COUGH: Primary | ICD-10-CM

## 2022-12-30 PROBLEM — J34.1 NASAL SINUS CYST: Status: ACTIVE | Noted: 2022-10-14

## 2022-12-30 RX ORDER — CEFUROXIME AXETIL 500 MG/1
500 TABLET ORAL EVERY 12 HOURS SCHEDULED
Qty: 20 TABLET | Refills: 0 | Status: SHIPPED | OUTPATIENT
Start: 2022-12-30 | End: 2023-01-09

## 2022-12-30 NOTE — LETTER
[address] [place] [address]  [address] [facility] [address]      January 13, 2023    MRN: [id number]     Phone: [phone]     Dear Mr Min Hough recently had a(n) IMG DIAGNOSTIC IMAGING Nithya Pean performed on 12/30/2022 at  [address] [place] [address] that was requested by Nata Brownlee MD  The study was reviewed by a radiologist, which is a physician who specializes in medical imaging  The radiologist issued a report describing his or her findings  In that report there was a finding that the radiologist felt warranted further discussion with your health care provider and that discussion would be beneficial to you  The results were sent to Nata Brownlee MD on 01/01/2023  3:40 PM  We recommend that you contact Nata Brownlee MD at [phone] or set up an appointment to discuss the results of the imaging test  If you have already heard from Nata Brownlee MD regarding the results of your study, you can disregard this letter  This letter is not meant to alarm you, but intended to encourage you to follow-up on your results with the provider that sent you for the imaging study  In addition, we have enclosed answers to frequently asked questions by other patients who have also received a letter to review results with their health care provider (see page two)  Thank you for choosing [address] [place] [address] for your medical imaging needs  FREQUENTLY ASKED QUESTIONS    1  Why am I receiving this letter? [facility] requires us to notify patients who have findings on imaging exams that may require more testing or follow-up with a health professional within the next 3 months          2  How serious is the finding on the imaging test?  This letter is sent to all patients who may need follow-up or more testing within the next 3 months  Receiving this letter does not necessarily mean you have a life-threatening imaging finding or disease  Recommendations in the radiologist’s imaging report are general in nature and it is up to your healthcare provider to say whether those recommendations make sense for your situation  You are strongly encouraged to talk to your health care provider about the results and ask whether additional steps need to be taken  3  Where can I get a copy of the final report for my recent radiology exam?  To get a full copy of the report you can access your records online at [email or web address] or please contact [facility] Medical Records Department at [phone] Monday through Friday between 8 am and 6 pm          4  What do I need to do now? Please contact your health care provider who requested the imaging study to discuss what further actions (if any) are needed  You may have already heard from (your ordering provider) in regard to this test in which case you can disregard this letter  NOTICE IN ACCORDANCE WITH THE [place] STATE “PATIENT TEST RESULT INFORMATION ACT OF 2018”    You are receiving this notice as a result of a determination by your diagnostic imaging service that further discussions of your test results are warranted and would be beneficial to you  The complete results of your test or tests have been or will be sent to the health care practitioner that ordered the test or tests  It is recommended that you contact your health care practitioner to discuss your results as soon as possible

## 2022-12-30 NOTE — PROGRESS NOTES
Assessment/Plan:     Unclear if this cough is longstanding or now has changed  He appears to have rhonchi at the right base so we will send him for chest x-ray  We will start him on antibiotics for presumed pneumonia  If no pneumonia or heart failure, consider starting steroid inhaler and PFTs next  Quality Measures:       Return if symptoms worsen or fail to improve  No problem-specific Assessment & Plan notes found for this encounter  Diagnoses and all orders for this visit:    Acute cough  -     XR chest pa & lateral; Future  -     cefuroxime (CEFTIN) 500 mg tablet; Take 1 tablet (500 mg total) by mouth every 12 (twelve) hours for 10 days        Subjective:      Patient ID: Yandy Moody is a 80 y o  male  Patient comes in today because he has been seeing an ENT specialist for cough and sinus issues  He reports that the postnasal drip has improved markedly and his sinuses are not bothering him like before  However, the cough persists  He states he has had coughing off and on since earlier in the year  He has no history of heart failure  No history of lung problems  Could not say for sure whether the cough has changed recently  But despite all of the ENT treatments, it persists        ALLERGIES:  No Known Allergies    CURRENT MEDICATIONS:    Current Outpatient Medications:   •  amLODIPine (NORVASC) 2 5 mg tablet, TAKE 1 TABLET BY MOUTH  DAILY, Disp: 90 tablet, Rfl: 3  •  aspirin (ECOTRIN LOW STRENGTH) 81 mg EC tablet, , Disp: , Rfl:   •  atenolol (TENORMIN) 50 mg tablet, Take 1 5 tablets (75 mg total) by mouth 2 (two) times a day, Disp: 60 tablet, Rfl:   •  atorvastatin (LIPITOR) 40 mg tablet, TAKE 1 TABLET BY MOUTH  DAILY AT BEDTIME, Disp: 90 tablet, Rfl: 3  •  cefuroxime (CEFTIN) 500 mg tablet, Take 1 tablet (500 mg total) by mouth every 12 (twelve) hours for 10 days, Disp: 20 tablet, Rfl: 0  •  FLUoxetine (PROzac) 20 mg capsule, TAKE 1 CAPSULE BY MOUTH  DAILY, Disp: 90 capsule, Rfl: 3  •  gabapentin (Neurontin) 100 mg capsule, Take 1 capsule (100 mg total) by mouth 3 (three) times a day, Disp: 90 capsule, Rfl: 3  •  Nasal Wash (Alkalol) SOLN, 1 oz into each nostril 2 (two) times a day as needed (for phlegm in throat) OR GARGLE TWICE A DAY , Disp: 473 mL, Rfl: 11  •  predniSONE 20 mg tablet, 3 tablets each morning x 5 days, 2 tablets each morning x 3 days, 1 tablet each morning x 2 days  , Disp: 23 tablet, Rfl: 0    ACTIVE PROBLEM LIST:  Patient Active Problem List   Diagnosis   • Benign essential hypertension   • Hyperlipidemia   • Anxiety disorder   • Atrial fibrillation (HCC)   • Nasal sinus cyst       PAST MEDICAL HISTORY:  Past Medical History:   Diagnosis Date   • Nosebleed    • Retinal hemorrhage, left     last assessed: 9/2/2015       PAST SURGICAL HISTORY:  Past Surgical History:   Procedure Laterality Date   • LITHOTRIPSY     • TONSILLECTOMY         FAMILY HISTORY:  History reviewed  No pertinent family history      SOCIAL HISTORY:  Social History     Socioeconomic History   • Marital status: /Civil Union     Spouse name: Not on file   • Number of children: Not on file   • Years of education: Not on file   • Highest education level: Not on file   Occupational History   • Occupation: retired   Tobacco Use   • Smoking status: Former   • Smokeless tobacco: Never   Vaping Use   • Vaping Use: Never used   Substance and Sexual Activity   • Alcohol use: Never   • Drug use: No   • Sexual activity: Not Currently     Comment: denied: histroy of high risk sexual behavior   Other Topics Concern   • Not on file   Social History Narrative   • Not on file     Social Determinants of Health     Financial Resource Strain: Not on file   Food Insecurity: Not on file   Transportation Needs: Not on file   Physical Activity: Not on file   Stress: Not on file   Social Connections: Not on file   Intimate Partner Violence: Not on file   Housing Stability: Not on file       Review of Systems   Respiratory: Positive for cough  Negative for shortness of breath and wheezing  Cardiovascular: Negative for chest pain  Gastrointestinal: Negative for abdominal pain  Objective:  Vitals:    12/30/22 1057   BP: 136/80   BP Location: Left arm   Patient Position: Sitting   Pulse: (!) 116   Resp: 14   SpO2: 98%   Weight: 81 6 kg (180 lb)   Height: 5' 7" (1 702 m)     Body mass index is 28 19 kg/m²  Physical Exam  Vitals and nursing note reviewed  Constitutional:       Appearance: He is well-developed  Cardiovascular:      Rate and Rhythm: Normal rate and regular rhythm  Heart sounds: Normal heart sounds  Pulmonary:      Effort: Pulmonary effort is normal       Breath sounds: Examination of the right-lower field reveals rhonchi  Rhonchi present  No wheezing or rales  Abdominal:      Palpations: Abdomen is soft  Tenderness: There is no abdominal tenderness  Neurological:      Mental Status: He is alert and oriented to person, place, and time  RESULTS:    No results found for this or any previous visit (from the past 1008 hour(s))  This note was created with voice recognition software  Phonic, grammatical and spelling errors may be present within the note as a result

## 2023-01-03 DIAGNOSIS — R93.89 ABNORMAL CXR: ICD-10-CM

## 2023-01-03 DIAGNOSIS — R05.3 CHRONIC COUGH: Primary | ICD-10-CM

## 2023-01-03 RX ORDER — FLUTICASONE PROPIONATE 50 MCG
1 BLISTER, WITH INHALATION DEVICE INHALATION 2 TIMES DAILY
Qty: 60 BLISTER | Refills: 0 | Status: SHIPPED | OUTPATIENT
Start: 2023-01-03 | End: 2023-02-02

## 2023-01-05 ENCOUNTER — TELEPHONE (OUTPATIENT)
Dept: INTERNAL MEDICINE CLINIC | Facility: CLINIC | Age: 83
End: 2023-01-05

## 2023-01-05 NOTE — TELEPHONE ENCOUNTER
Should pt stop his ceftin he asks since no pneumonia or fluid on chest xray      I believe he is scheduling his ct scan chest also

## 2023-01-22 ENCOUNTER — HOSPITAL ENCOUNTER (OUTPATIENT)
Dept: CT IMAGING | Facility: HOSPITAL | Age: 83
Discharge: HOME | End: 2023-01-22
Attending: INTERNAL MEDICINE

## 2023-01-22 DIAGNOSIS — R93.89 ABNORMAL CXR: ICD-10-CM

## 2023-01-22 DIAGNOSIS — R05.3 CHRONIC COUGH: ICD-10-CM

## 2023-01-26 ENCOUNTER — TELEPHONE (OUTPATIENT)
Dept: INTERNAL MEDICINE CLINIC | Facility: CLINIC | Age: 83
End: 2023-01-26

## 2023-01-26 NOTE — TELEPHONE ENCOUNTER
Pt would like results of a ct chest he did a few days ago please if dr Vicky Agustin has any words on it

## 2023-01-27 DIAGNOSIS — R05.3 CHRONIC COUGH: Primary | ICD-10-CM

## 2023-01-27 DIAGNOSIS — J84.10 PULMONARY FIBROSIS DETERMINED BY HIGH RESOLUTION COMPUTED TOMOGRAPHY (HCC): ICD-10-CM

## 2023-02-02 ENCOUNTER — TELEPHONE (OUTPATIENT)
Dept: PULMONOLOGY | Facility: CLINIC | Age: 83
End: 2023-02-02

## 2023-02-06 ENCOUNTER — OFFICE VISIT (OUTPATIENT)
Dept: INTERNAL MEDICINE CLINIC | Facility: CLINIC | Age: 83
End: 2023-02-06

## 2023-02-06 VITALS
BODY MASS INDEX: 28.79 KG/M2 | SYSTOLIC BLOOD PRESSURE: 122 MMHG | HEART RATE: 90 BPM | RESPIRATION RATE: 20 BRPM | TEMPERATURE: 98.3 F | HEIGHT: 67 IN | DIASTOLIC BLOOD PRESSURE: 80 MMHG | OXYGEN SATURATION: 96 % | WEIGHT: 183.4 LBS

## 2023-02-06 DIAGNOSIS — I48.91 ATRIAL FIBRILLATION, UNSPECIFIED TYPE (HCC): ICD-10-CM

## 2023-02-06 DIAGNOSIS — J84.10 PULMONARY FIBROSIS DETERMINED BY HIGH RESOLUTION COMPUTED TOMOGRAPHY (HCC): ICD-10-CM

## 2023-02-06 DIAGNOSIS — E78.2 MIXED HYPERLIPIDEMIA: ICD-10-CM

## 2023-02-06 DIAGNOSIS — R05.3 CHRONIC COUGH: ICD-10-CM

## 2023-02-06 DIAGNOSIS — I10 BENIGN ESSENTIAL HYPERTENSION: ICD-10-CM

## 2023-02-06 DIAGNOSIS — Z00.00 MEDICARE ANNUAL WELLNESS VISIT, SUBSEQUENT: Primary | ICD-10-CM

## 2023-02-06 DIAGNOSIS — Z13.31 DEPRESSION SCREEN: ICD-10-CM

## 2023-02-06 RX ORDER — FLUTICASONE PROPIONATE 50 MCG
1 BLISTER, WITH INHALATION DEVICE INHALATION 2 TIMES DAILY
Qty: 180 BLISTER | Refills: 1 | Status: SHIPPED | OUTPATIENT
Start: 2023-02-06 | End: 2023-03-08

## 2023-02-06 NOTE — PATIENT INSTRUCTIONS
Start fluticasone Diskus 50 mcg 1 puff twice a day  Let me know in a few weeks the effectiveness  Have pulmonary function studies done for your upcoming pulmonary evaluation  No medication changes at this time  We will get together late April to review blood work that you will be doing  Medicare Preventive Visit Patient Instructions  Thank you for completing your Welcome to Medicare Visit or Medicare Annual Wellness Visit today  Your next wellness visit will be due in one year (2/7/2024)  The screening/preventive services that you may require over the next 5-10 years are detailed below  Some tests may not apply to you based off risk factors and/or age  Screening tests ordered at today's visit but not completed yet may show as past due  Also, please note that scanned in results may not display below  Preventive Screenings:  Service Recommendations Previous Testing/Comments   Colorectal Cancer Screening  Colonoscopy    Fecal Occult Blood Test (FOBT)/Fecal Immunochemical Test (FIT)  Fecal DNA/Cologuard Test  Flexible Sigmoidoscopy Age: 39-70 years old   Colonoscopy: every 10 years (May be performed more frequently if at higher risk)  OR  FOBT/FIT: every 1 year  OR  Cologuard: every 3 years  OR  Sigmoidoscopy: every 5 years  Screening may be recommended earlier than age 39 if at higher risk for colorectal cancer  Also, an individualized decision between you and your healthcare provider will decide whether screening between the ages of 74-80 would be appropriate   Colonoscopy: Not on file  FOBT/FIT: Not on file  Cologuard: Not on file  Sigmoidoscopy: Not on file          Prostate Cancer Screening Individualized decision between patient and health care provider in men between ages of 53-78   Medicare will cover every 12 months beginning on the day after your 50th birthday PSA: 0 7 ng/mL           Hepatitis C Screening Once for adults born between [facility]  More frequently in patients at high risk for Hepatitis C Hep C Antibody: Not on file        Diabetes Screening 1-2 times per year if you're at risk for diabetes or have pre-diabetes Fasting glucose: 96 mg/dL (9/26/2022)  A1C: 5 6 (5/17/2019)      Cholesterol Screening Once every 5 years if you don't have a lipid disorder  May order more often based on risk factors  Lipid panel: 09/26/2022         Other Preventive Screenings Covered by Medicare:  Abdominal Aortic Aneurysm (AAA) Screening: covered once if your at risk  You're considered to be at risk if you have a family history of AAA or a male between the age of 73-68 who smoking at least 100 cigarettes in your lifetime  Lung Cancer Screening: covers low dose CT scan once per year if you meet all of the following conditions: (1) Age 50-69; (2) No signs or symptoms of lung cancer; (3) Current smoker or have quit smoking within the last 15 years; (4) You have a tobacco smoking history of at least 20 pack years (packs per day x number of years you smoked); (5) You get a written order from a healthcare provider  Glaucoma Screening: covered annually if you're considered high risk: (1) You have diabetes OR (2) Family history of glaucoma OR (3)  aged 48 and older OR (3)  American aged 72 and older  Osteoporosis Screening: covered every 2 years if you meet one of the following conditions: (1) Have a vertebral abnormality; (2) On glucocorticoid therapy for more than 3 months; (3) Have primary hyperparathyroidism; (4) On osteoporosis medications and need to assess response to drug therapy  HIV Screening: covered annually if you're between the age of 12-76  Also covered annually if you are younger than 13 and older than 72 with risk factors for HIV infection  For pregnant patients, it is covered up to 3 times per pregnancy      Immunizations:  Immunization Recommendations   Influenza Vaccine Annual influenza vaccination during flu season is recommended for all persons aged >= 6 months who do not have contraindications   Pneumococcal Vaccine   * Pneumococcal conjugate vaccine = PCV13 (Prevnar 13), PCV15 (Vaxneuvance), PCV20 (Prevnar 20)  * Pneumococcal polysaccharide vaccine = PPSV23 (Pneumovax) Adults 2364 years old: 1-3 doses may be recommended based on certain risk factors  Adults 72 years old: 1-2 doses may be recommended based off what pneumonia vaccine you previously received   Hepatitis B Vaccine 3 dose series if at intermediate or high risk (ex: diabetes, end stage renal disease, liver disease)   Tetanus (Td) Vaccine - COST NOT COVERED BY MEDICARE PART B Following completion of primary series, a booster dose should be given every 10 years to maintain immunity against tetanus  Td may also be given as tetanus wound prophylaxis  Tdap Vaccine - COST NOT COVERED BY MEDICARE PART B Recommended at least once for all adults  For pregnant patients, recommended with each pregnancy  Shingles Vaccine (Shingrix) - COST NOT COVERED BY MEDICARE PART B  2 shot series recommended in those aged 48 and above     Health Maintenance Due:  There are no preventive care reminders to display for this patient  Immunizations Due:      Topic Date Due    Pneumococcal Vaccine: 65+ Years (1 - PCV) Never done    COVID-19 Vaccine (3 - Booster for Moderna series) 05/04/2021    Influenza Vaccine (1) Never done     Advance Directives   What are advance directives? Advance directives are legal documents that state your wishes and plans for medical care  These plans are made ahead of time in case you lose your ability to make decisions for yourself  Advance directives can apply to any medical decision, such as the treatments you want, and if you want to donate organs  What are the types of advance directives? There are many types of advance directives, and each state has rules about how to use them  You may choose a combination of any of the following:  Living will: This is a written record of the treatment you want   You can also choose which treatments you do not want, which to limit, and which to stop at a certain time  This includes surgery, medicine, IV fluid, and tube feedings  [facility] power of  for healthcare [place] [facility]): This is a written record that states who you want to make healthcare choices for you when you are unable to make them for yourself  This person, called a proxy, is usually a family member or a friend  You may choose more than 1 proxy  Do not resuscitate (DNR) order:  A DNR order is used in case your heart stops beating or you stop breathing  It is a request not to have certain forms of treatment, such as CPR  A DNR order may be included in other types of advance directives  Medical directive: This covers the care that you want if you are in a coma, near death, or unable to make decisions for yourself  You can list the treatments you want for each condition  Treatment may include pain medicine, surgery, blood transfusions, dialysis, IV or tube feedings, and a ventilator (breathing machine)  Values history: This document has questions about your views, beliefs, and how you feel and think about life  This information can help others choose the care that you would choose  Why are advance directives important? An advance directive helps you control your care  Although spoken wishes may be used, it is better to have your wishes written down  Spoken wishes can be misunderstood, or not followed  Treatments may be given even if you do not want them  An advance directive may make it easier for your family to make difficult choices about your care  Weight Management   Why it is important to manage your weight:  Being overweight increases your risk of health conditions such as heart disease, high blood pressure, type 2 diabetes, and certain types of cancer  It can also increase your risk for osteoarthritis, sleep apnea, and other respiratory problems  Aim for a slow, steady weight loss   Even a small amount of weight loss can lower your risk of health problems  How to lose weight safely:  A safe and healthy way to lose weight is to eat fewer calories and get regular exercise  You can lose up about 1 pound a week by decreasing the number of calories you eat by 500 calories each day  Healthy meal plan for weight management:  A healthy meal plan includes a variety of foods, contains fewer calories, and helps you stay healthy  A healthy meal plan includes the following:  Eat whole-grain foods more often  A healthy meal plan should contain fiber  Fiber is the part of grains, fruits, and vegetables that is not broken down by your body  Whole-grain foods are healthy and provide extra fiber in your diet  Some examples of whole-grain foods are whole-wheat breads and pastas, oatmeal, brown rice, and bulgur  Eat a variety of vegetables every day  Include dark, leafy greens such as spinach, kale, yeny greens, and mustard greens  Eat yellow and orange vegetables such as carrots, sweet potatoes, and winter squash  Eat a variety of fruits every day  Choose fresh or canned fruit (canned in its own juice or light syrup) instead of juice  Fruit juice has very little or no fiber  Eat low-fat dairy foods  Drink fat-free (skim) milk or 1% milk  Eat fat-free yogurt and low-fat cottage cheese  Try low-fat cheeses such as mozzarella and other reduced-fat cheeses  Choose meat and other protein foods that are low in fat  Choose beans or other legumes such as split peas or lentils  Choose fish, skinless poultry (chicken or turkey), or lean cuts of red meat (beef or pork)  Before you cook meat or poultry, cut off any visible fat  Use less fat and oil  Try baking foods instead of frying them  Add less fat, such as margarine, sour cream, regular salad dressing and mayonnaise to foods  Eat fewer high-fat foods  Some examples of high-fat foods include french fries, doughnuts, ice cream, and cakes  Eat fewer sweets    Limit foods and drinks that are high in sugar  This includes candy, cookies, regular soda, and sweetened drinks  Exercise:  Exercise at least 30 minutes per day on most days of the week  Some examples of exercise include walking, biking, dancing, and swimming  You can also fit in more physical activity by taking the stairs instead of the elevator or parking farther away from stores  Ask your healthcare provider about the best exercise plan for you  © Copyright [employer] 2018 Information is for End User's use only and may not be sold, redistributed or otherwise used for commercial purposes   All illustrations and images included in CareNotes® are the copyrighted property of [employer]  or [address] [place] [address]

## 2023-02-06 NOTE — PROGRESS NOTES
Assessment and Plan:   Start fluticasone Diskus 50 mcg 1 puff twice a day  Let me know in a few weeks the effectiveness  Have pulmonary function studies done for your upcoming pulmonary evaluation  No medication changes at this time  We will get together late April to review blood work that you will be doing  Problem List Items Addressed This Visit        Cardiovascular and Mediastinum    Benign essential hypertension    Atrial fibrillation ([address] )       Other    Hyperlipidemia   Other Visit Diagnoses     Medicare annual wellness visit, subsequent    -  Primary    Depression screen        Chronic cough        Relevant Medications    fluticasone (Flovent Diskus) 50 mcg/actuation diskus inhaler    Other Relevant Orders    Complete PFT with post bronchodilator    Pulmonary fibrosis determined by high resolution computed tomography (HCC)        Relevant Medications    fluticasone (Flovent Diskus) 50 mcg/actuation diskus inhaler    Other Relevant Orders    Complete PFT with post bronchodilator        BMI Counseling: Body mass index is 28 72 kg/m²  The BMI is above normal  Nutrition recommendations include decreasing portion sizes, encouraging healthy choices of fruits and vegetables, consuming healthier snacks, moderation in carbohydrate intake and reducing intake of cholesterol  Rationale for BMI follow-up plan is due to patient being overweight or obese  Depression Screening and Follow-up Plan: Patient was screened for depression during today's encounter  They screened negative with a PHQ-2 score of 0  Preventive health issues were discussed with patient, and age appropriate screening tests were ordered as noted in patient's After Visit Summary  Personalized health advice and appropriate referrals for health education or preventive services given if needed, as noted in patient's After Visit Summary  History of Present Illness:     Patient presents for a Medicare Wellness Visit    Questionnaire has been reviewed, clarified, and updated with the patient today  Follow-up, no labs done for this visit  Hypertension: Good control on current medications  [place] cp, palp, sob, edema, HA, dizziness, diaphoresis, syncope, visual disturbance  Chronic atrial fibrillation: Follows with cardiology  Had difficulty with anticoagulants causing severe nosebleeds  His cardiologist would like him to consider the Watchman procedure  Currently only on baby aspirin 1 daily  On beta-blocker for rate control  Chronic cough: CT of chest has shown possible pulmonary fibrosis  Patient has been referred to pulmonary  Patient did not get fluticasone inhaler filled  We did discuss the importance of trying this prior to getting into pulmonary to give them more information  I have also put in an order for pulmonary function studies  At this point cough remains unchanged  Worse at night  Associated with wheezing  Associated short of breath  Patient Care Team:  Burke Littlejohn MD as PCP - General     Review of Systems:     Review of Systems   Constitutional: Positive for fatigue  Negative for activity change, chills and fever  HENT: Negative for congestion  Eyes: Negative for discharge  Respiratory: Positive for cough and wheezing  Negative for chest tightness and shortness of breath  Cardiovascular: Negative for chest pain, palpitations and leg swelling  Gastrointestinal: Negative for abdominal pain, blood in stool, constipation, diarrhea, nausea and vomiting  Endocrine: Negative for polydipsia, polyphagia and polyuria  Genitourinary: Negative for difficulty urinating  Musculoskeletal: Negative for arthralgias and myalgias  Skin: Negative for rash  Allergic/Immunologic: Negative for immunocompromised state  Neurological: Negative for dizziness, syncope, weakness, light-headedness and headaches  Hematological: Negative for adenopathy  Does not bruise/bleed easily     Psychiatric/Behavioral: Positive for sleep disturbance (Secondary to cough)  Negative for dysphoric mood and suicidal ideas  The patient is not nervous/anxious  Problem List:     Patient Active Problem List   Diagnosis   • Benign essential hypertension   • Hyperlipidemia   • Anxiety disorder   • Atrial fibrillation [facility])   • Nasal sinus cyst      Past Medical and Surgical History:     Past Medical History:   Diagnosis Date   • Nosebleed    • Retinal hemorrhage, left     last assessed: 9/2/2015     Past Surgical History:   Procedure Laterality Date   • LITHOTRIPSY     • TONSILLECTOMY        Family History:     History reviewed  No pertinent family history  Social History:     Social History     Socioeconomic History   • Marital status: /Civil Union     Spouse name: None   • Number of children: None   • Years of education: None   • Highest education level: None   Occupational History   • Occupation: retired   Tobacco Use   • Smoking status: Former   • Smokeless tobacco: Never   Vaping Use   • Vaping Use: Never used   Substance and Sexual Activity   • Alcohol use: Never   • Drug use: No   • Sexual activity: Not Currently     Comment: denied: histroy of high risk sexual behavior   Other Topics Concern   • None   Social History Narrative   • None     Social Determinants of Health     Financial Resource Strain: Low Risk    • Difficulty of Paying Living Expenses: Not hard at all   Food Insecurity: Not on file   Transportation Needs: No Transportation Needs   • Lack of Transportation (Medical): No   • Lack of Transportation (Non-Medical):  No   Physical Activity: Not on file   Stress: Not on file   Social Connections: Not on file   Intimate Partner Violence: Not on file   Housing Stability: Not on file      Medications and Allergies:     Current Outpatient Medications   Medication Sig Dispense Refill   • amLODIPine (NORVASC) 2 5 mg tablet TAKE 1 TABLET BY MOUTH  DAILY 90 tablet 3   • aspirin (ECOTRIN LOW STRENGTH) 81 mg EC tablet • atenolol (TENORMIN) 50 mg tablet Take 1 5 tablets (75 mg total) by mouth 2 (two) times a day 60 tablet    • atorvastatin (LIPITOR) 40 mg tablet TAKE 1 TABLET BY MOUTH  DAILY AT BEDTIME 90 tablet 3   • FLUoxetine (PROzac) 20 mg capsule TAKE 1 CAPSULE BY MOUTH  DAILY 90 capsule 3   • fluticasone (Flovent Diskus) 50 mcg/actuation diskus inhaler Inhale 1 puff 2 (two) times a day Rinse mouth after use  180 blister 1     No current facility-administered medications for this visit  No Known Allergies   Immunizations:     Immunization History   Administered Date(s) Administered   • COVID-19 MODERNA VACC 0 5 ML IM 02/10/2021, 03/09/2021   • Tdap 10/16/2014      Health Maintenance: There are no preventive care reminders to display for this patient  Topic Date Due   • Pneumococcal Vaccine: 65+ Years (1 - PCV) Never done   • COVID-19 Vaccine (3 - Booster for Moderna series) 05/04/2021   • Influenza Vaccine (1) Never done      Medicare Screening Tests and Risk Assessments:     Owen Vargas is here for his Subsequent Wellness visit  Last Medicare Wellness visit information reviewed, patient interviewed and updates made to the record today  Health Risk Assessment:   Patient rates overall health as fair  Patient feels that their physical health rating is same  Patient is satisfied with their life  Eyesight was rated as slightly worse  Hearing was rated as slightly worse  Patient feels that their emotional and mental health rating is same  Patients states they are never, rarely angry  Patient states they are often unusually tired/fatigued  Pain experienced in the last 7 days has been none  Patient states that he has experienced no weight loss or gain in last 6 months  Depression Screening:   PHQ-2 Score: 0      Fall Risk Screening: In the past year, patient has experienced: no history of falling in past year      Home Safety:  Patient does not have trouble with stairs inside or outside of their home   Patient has working smoke alarms and has working carbon monoxide detector  Home safety hazards include: none  Nutrition:   Current diet is Regular and No Added Salt  Medications:   Patient is not currently taking any over-the-counter supplements  Patient is able to manage medications  Activities of Daily Living (ADLs)/Instrumental Activities of Daily Living (IADLs):   Walk and transfer into and out of bed and chair?: Yes  Dress and groom yourself?: Yes    Bathe or shower yourself?: Yes    Feed yourself?  Yes  Do your laundry/housekeeping?: Yes  Manage your money, pay your bills and track your expenses?: Yes  Make your own meals?: Yes    Do your own shopping?: Yes    Previous Hospitalizations:   Any hospitalizations or ED visits within the last 12 months?: No      Advance Care Planning:   Living will: No    Durable POA for healthcare: No    Advanced directive: No    Advanced directive counseling given: Yes    Five wishes given: Yes    End of Life Decisions reviewed with patient: Yes      Cognitive Screening:   Provider or family/friend/caregiver concerned regarding cognition?: No    PREVENTIVE SCREENINGS      Cardiovascular Screening:    General: Screening Not Indicated, History Lipid Disorder and Screening Current      Diabetes Screening:     General: Screening Current      Colorectal Cancer Screening:     General: Screening Not Indicated      Prostate Cancer Screening:    General: Screening Not Indicated      Osteoporosis Screening:    General: Screening Not Indicated      Abdominal Aortic Aneurysm (AAA) Screening:    Risk factors include: tobacco use        General: Screening Not Indicated      Lung Cancer Screening:     General: Screening Not Indicated      Hepatitis C Screening:    General: Screening Not Indicated    Screening, Brief Intervention, and Referral to Treatment (SBIRT)    Screening  Typical number of drinks in a day: 0  Typical number of drinks in a week: 2  Interpretation: Low risk drinking behavior  Single Item Drug Screening:  How often have you used an illegal drug (including marijuana) or a prescription medication for non-medical reasons in the past year? never    Single Item Drug Screen Score: 0  Interpretation: Negative screen for possible drug use disorder    Brief Intervention  Alcohol & drug use screenings were reviewed  No concerns regarding substance use disorder identified  No results found  Physical Exam:     /80 (BP Location: Left arm, Patient Position: Sitting)   Pulse 90   Temp 98 3 °F (36 8 °C) (Tympanic)   Resp 20   Ht 5' 7" (1 702 m)   Wt 83 2 kg (183 lb 6 4 oz)   SpO2 96%   BMI 28 72 kg/m²     Physical Exam  Vitals and nursing note reviewed  Constitutional:       General: He is not in acute distress  Appearance: Normal appearance  He is well-developed  He is not ill-appearing  HENT:      Head: Normocephalic and atraumatic  Nose: Nose normal    Eyes:      Extraocular Movements: Extraocular movements intact  Conjunctiva/sclera: Conjunctivae normal       Pupils: Pupils are equal, round, and reactive to light  Neck:      Thyroid: No thyromegaly  Vascular: No carotid bruit or JVD  Cardiovascular:      Rate and Rhythm: Normal rate  Rhythm irregularly irregular  Heart sounds: No murmur heard  Pulmonary:      Effort: Pulmonary effort is normal  No respiratory distress  Comments: Audible late inspiratory wheezes right upper and mid base  Decreased breath sounds throughout  Prolonged expiratory phase  Chest:      Chest wall: No tenderness  Abdominal:      Palpations: There is no hepatomegaly or splenomegaly  Musculoskeletal:         General: No swelling  Normal range of motion  Cervical back: Normal range of motion and neck supple  Right lower leg: No edema  Left lower leg: No edema  Lymphadenopathy:      Cervical: No cervical adenopathy  Skin:     General: Skin is warm and dry  Findings: No rash  Neurological:      General: No focal deficit present  Mental Status: He is alert and oriented to person, place, and time  Mental status is at baseline  Cranial Nerves: No cranial nerve deficit  Sensory: No sensory deficit  Motor: No weakness  Coordination: Coordination normal       Gait: Gait normal       Deep Tendon Reflexes: Reflexes normal    Psychiatric:         Mood and Affect: Mood normal          Behavior: Behavior normal          Thought Content:  Thought content normal          Judgment: Judgment normal           Sarbjit Marquez PA-C

## 2023-03-16 ENCOUNTER — HOSPITAL ENCOUNTER (OUTPATIENT)
Dept: PULMONOLOGY | Facility: HOSPITAL | Age: 83
End: 2023-03-16

## 2023-03-16 DIAGNOSIS — J84.10 PULMONARY FIBROSIS DETERMINED BY HIGH RESOLUTION COMPUTED TOMOGRAPHY (HCC): ICD-10-CM

## 2023-03-16 DIAGNOSIS — R05.3 CHRONIC COUGH: ICD-10-CM

## 2023-03-16 RX ORDER — ALBUTEROL SULFATE 2.5 MG/3ML
2.5 SOLUTION RESPIRATORY (INHALATION) ONCE
Status: COMPLETED | OUTPATIENT
Start: 2023-03-16 | End: 2023-03-16

## 2023-03-16 RX ADMIN — ALBUTEROL SULFATE 2.5 MG: 2.5 SOLUTION RESPIRATORY (INHALATION) at 13:03

## 2023-04-04 ENCOUNTER — CONSULT (OUTPATIENT)
Age: 83
End: 2023-04-04

## 2023-04-04 VITALS
HEART RATE: 114 BPM | WEIGHT: 176.8 LBS | HEIGHT: 67 IN | TEMPERATURE: 98.2 F | BODY MASS INDEX: 27.75 KG/M2 | OXYGEN SATURATION: 97 % | DIASTOLIC BLOOD PRESSURE: 84 MMHG | SYSTOLIC BLOOD PRESSURE: 124 MMHG

## 2023-04-04 DIAGNOSIS — J84.10 PULMONARY FIBROSIS DETERMINED BY HIGH RESOLUTION COMPUTED TOMOGRAPHY (HCC): ICD-10-CM

## 2023-04-04 DIAGNOSIS — R05.3 CHRONIC COUGH: ICD-10-CM

## 2023-04-04 PROBLEM — J34.1 NASAL SINUS CYST: Status: RESOLVED | Noted: 2022-10-14 | Resolved: 2023-04-04

## 2023-04-04 RX ORDER — BUDESONIDE, GLYCOPYRROLATE, AND FORMOTEROL FUMARATE 160; 9; 4.8 UG/1; UG/1; UG/1
2 AEROSOL, METERED RESPIRATORY (INHALATION) 2 TIMES DAILY
Qty: 32.1 G | Refills: 3 | Status: SHIPPED | OUTPATIENT
Start: 2023-04-04

## 2023-04-04 RX ORDER — BUDESONIDE, GLYCOPYRROLATE, AND FORMOTEROL FUMARATE 160; 9; 4.8 UG/1; UG/1; UG/1
2 AEROSOL, METERED RESPIRATORY (INHALATION) 2 TIMES DAILY
Refills: 0 | Status: CANCELLED | COMMUNITY
Start: 2023-04-04 | End: 2023-05-02

## 2023-04-04 NOTE — PROGRESS NOTES
Consultation - Pulmonary Medicine   Brandon Coppola 80 y o  male MRN: [id number]    Physician Requesting Consult: Dr Megan Rees  Reason for Consult: Cough, pulmonary fibrosis    Chronic cough  · Multifactorial likely due to a component of pulmonary fibrosis but also due to chronic postnasal drip  · He has tried multiple agents in the past but is not using any nasal sprays and is hesitant to do so due to problems with recurrent nosebleeds in the past   He did briefly try Flonase but had a nosebleed and stopped  · Unwilling to try ipratropium nasal spray  · Failed Tessalon, cough suppression, 2 brief courses of systemic steroids, antibiotics, and gabapentin  · Recommended Breztri 160 which will combine high-dose ICS with LAMA and LABA  I did explain to him that it may help the cough but it likely will not eliminate it without addressing the nasal drip further  Pulmonary fibrosis determined by high resolution computed tomography [facility])  · Will need follow-up imaging and pulmonary function test, would be reasonable to repeat in 1 year unless new or worsening symptoms  · Has mild subpleural disease with some traction bronchiectasis on imaging  Pulmonary function relatively preserved other than a moderate reduction in diffusion  · We will continue with symptom surveillance at this time  He will be followed up in 2 to 3 months  If I am not available, will follow up with physician assistant  ______________________________________________________________________    HPI:    Brandon Coppola is a 80 y o  male who presents for evaluation of a chronic cough  He has been following with his physicians for quite some time and symptoms have been over 1 year  He has also seen ear nose and throat  During the course of evaluation and management of this he has had 2 courses of steroid tapers, 2 courses of antibiotics including Levaquin and a cephalosporin, Tessalon, cough suppressants, allergy tablets, and Flonase    He has also tried gabapentin for over a month without any benefit  He does not have any evidence of chronic sinusitis but does have chronic nasal drip and produces whitish phlegm with the cough  He has not had any hemoptysis  He has only tried Flonase on a limited basis due to a nosebleed  He has had multiple recurrent nosebleeds in the past when he was on Eliquis and is very hesitant to try anything that would result in a nosebleed since he has required multiple cauterizations and nasal packings in the past    He reports no complaints of shortness of breath but is bothered predominantly by the cough  This is particularly bad at nighttime  He does not have any headaches or neurologic symptoms  No appetite or weight change  No arthralgias or myalgias  He does have history of atrial fibrillation  He is followed by Dr Jaylen Boykin  He is on 50 mg of atenolol twice daily and has an additional 25 mg that he takes at nighttime as well  Despite this, he does have a relatively consistent resting tachycardia  He is not on chronic anticoagulation due to the nosebleeds    Review of Systems:  Aside from what is mentioned in the HPI, the review of systems otherwise negative      Current Medications:    Current Outpatient Medications:   •  amLODIPine (NORVASC) 2 5 mg tablet, TAKE 1 TABLET BY MOUTH  DAILY, Disp: 90 tablet, Rfl: 3  •  aspirin (ECOTRIN LOW STRENGTH) 81 mg EC tablet, , Disp: , Rfl:   •  atenolol (TENORMIN) 50 mg tablet, Take 1 5 tablets (75 mg total) by mouth 2 (two) times a day, Disp: 60 tablet, Rfl:   •  atorvastatin (LIPITOR) 40 mg tablet, TAKE 1 TABLET BY MOUTH  DAILY AT BEDTIME, Disp: 90 tablet, Rfl: 3  •  Budeson-Glycopyrrol-Formoterol (Breztri Aerosphere) 160-9-4 8 MCG/ACT AERO, Inhale 2 puffs 2 (two) times a day Rinse mouth after use , Disp: 32 1 g, Rfl: 3  •  FLUoxetine (PROzac) 20 mg capsule, TAKE 1 CAPSULE BY MOUTH  DAILY, Disp: 90 capsule, Rfl: 3    Historical Information   Past Medical History: "  Diagnosis Date   • Nosebleed    • Retinal hemorrhage, left     last assessed: 2015     Past Surgical History:   Procedure Laterality Date   • LITHOTRIPSY     • TONSILLECTOMY       Social History   Social History     Tobacco Use   Smoking Status Former   • Packs/day: 2 00   • Years: 14 00   • Pack years: 28 00   • Types: Cigarettes   • Start date: 5   • Quit date: 1984   • Years since quittin 4   Smokeless Tobacco Never         Family History:   No significant family history of pulmonary disease    PhysicalExamination:  Vitals:   /84   Pulse (!) 114   Temp 98 2 °F (36 8 °C)   Ht 5' 7\" (1 702 m)   Wt 80 2 kg (176 lb 12 8 oz)   SpO2 97%   BMI 27 69 kg/m²   Gen:  Comfortable on room air  No conversational dyspnea  HEENT:  Conjugate gaze  sclerae anicteric  Oropharynx moist  Neck: Trachea is midline  No JVD  No adenopathy  Chest: Has bibasilar crackles  These are fairly minimal however  Does have a wet cough throughout the visit  Cardiac: Irregular, tachycardic  no murmur  Abdomen:  benign  Extremities: No edema  Neuro:  Normal speech and mentation      Diagnostic Data:  Labs: I personally reviewed the most recent laboratory data pertinent to today's visit    Lab Results   Component Value Date    WBC 6 93 2021    HGB 15 7 2021    HCT 47 5 2021    MCV 91 2021     2021     Lab Results   Component Value Date    GLUCOSE 66 2015    CALCIUM 9 1 2022     2015    K 4 6 2022    CO2 24 2022     (H) 2022    BUN 23 2022    CREATININE 1 34 (H) 2022     No results found for: IGE  Lab Results   Component Value Date    ALT 17 2022    AST 13 2022    ALKPHOS 128 (H) 2022    BILITOT 0 52 2015       PFT results: The most recent pulmonary function tests were reviewed  Complete pulmonary function test show normal spirometry, normal lung volumes    Diffusion capacity is moderately reduced " at 66% predicted    Imaging:  I personally reviewed the images on the [facility] system pertinent to today's visit  CAT scan of the chest which was done with high-resolution protocol does show some peripheral fibrotic changes most prominent at the base with some mild traction bronchiectasis    Ihor Mcardle, MD

## 2023-04-04 NOTE — ASSESSMENT & PLAN NOTE
· Multifactorial likely due to a component of pulmonary fibrosis but also due to chronic postnasal drip  · He has tried multiple agents in the past but is not using any nasal sprays and is hesitant to do so due to problems with recurrent nosebleeds in the past   He did briefly try Flonase but had a nosebleed and stopped  · Unwilling to try ipratropium nasal spray  · Failed Tessalon, cough suppression, 2 brief courses of systemic steroids, antibiotics, and gabapentin  · Recommended Breztri 160 which will combine high-dose ICS with LAMA and LABA  I did explain to him that it may help the cough but it likely will not eliminate it without addressing the nasal drip further

## 2023-05-06 ENCOUNTER — APPOINTMENT (OUTPATIENT)
Age: 83
End: 2023-05-06

## 2023-05-06 DIAGNOSIS — I10 BENIGN ESSENTIAL HYPERTENSION: ICD-10-CM

## 2023-05-06 DIAGNOSIS — E78.2 MIXED HYPERLIPIDEMIA: ICD-10-CM

## 2023-05-06 LAB
ALBUMIN SERPL BCP-MCNC: 3.2 G/DL (ref 3.5–5)
ALP SERPL-CCNC: 103 U/L (ref 46–116)
ALT SERPL W P-5'-P-CCNC: 20 U/L (ref 12–78)
ANION GAP SERPL CALCULATED.3IONS-SCNC: 2 MMOL/L (ref 4–13)
AST SERPL W P-5'-P-CCNC: 20 U/L (ref 5–45)
BASOPHILS # BLD AUTO: 0.07 THOUSANDS/ÂΜL (ref 0–0.1)
BASOPHILS NFR BLD AUTO: 1 % (ref 0–1)
BILIRUB SERPL-MCNC: 0.82 MG/DL (ref 0.2–1)
BUN SERPL-MCNC: 20 MG/DL (ref 5–25)
CALCIUM ALBUM COR SERPL-MCNC: 9.7 MG/DL (ref 8.3–10.1)
CALCIUM SERPL-MCNC: 9.1 MG/DL (ref 8.3–10.1)
CHLORIDE SERPL-SCNC: 109 MMOL/L (ref 96–108)
CHOLEST SERPL-MCNC: 148 MG/DL
CO2 SERPL-SCNC: 26 MMOL/L (ref 21–32)
CREAT SERPL-MCNC: 1.12 MG/DL (ref 0.6–1.3)
EOSINOPHIL # BLD AUTO: 0.15 THOUSAND/ÂΜL (ref 0–0.61)
EOSINOPHIL NFR BLD AUTO: 2 % (ref 0–6)
ERYTHROCYTE [DISTWIDTH] IN BLOOD BY AUTOMATED COUNT: 14.4 % (ref 11.6–15.1)
GFR SERPL CREATININE-BSD FRML MDRD: 60 ML/MIN/1.73SQ M
GLUCOSE P FAST SERPL-MCNC: 102 MG/DL (ref 65–99)
HCT VFR BLD AUTO: 47.7 % (ref 36.5–49.3)
HDLC SERPL-MCNC: 67 MG/DL
HGB BLD-MCNC: 15.5 G/DL (ref 12–17)
IMM GRANULOCYTES # BLD AUTO: 0.04 THOUSAND/UL (ref 0–0.2)
IMM GRANULOCYTES NFR BLD AUTO: 1 % (ref 0–2)
LDLC SERPL CALC-MCNC: 68 MG/DL (ref 0–100)
LYMPHOCYTES # BLD AUTO: 2.59 THOUSANDS/ÂΜL (ref 0.6–4.47)
LYMPHOCYTES NFR BLD AUTO: 32 % (ref 14–44)
MCH RBC QN AUTO: 28.8 PG (ref 26.8–34.3)
MCHC RBC AUTO-ENTMCNC: 32.5 G/DL (ref 31.4–37.4)
MCV RBC AUTO: 89 FL (ref 82–98)
MONOCYTES # BLD AUTO: 0.97 THOUSAND/ÂΜL (ref 0.17–1.22)
MONOCYTES NFR BLD AUTO: 12 % (ref 4–12)
NEUTROPHILS # BLD AUTO: 4.39 THOUSANDS/ÂΜL (ref 1.85–7.62)
NEUTS SEG NFR BLD AUTO: 52 % (ref 43–75)
NONHDLC SERPL-MCNC: 81 MG/DL
NRBC BLD AUTO-RTO: 0 /100 WBCS
PLATELET # BLD AUTO: 292 THOUSANDS/UL (ref 149–390)
PMV BLD AUTO: 8.9 FL (ref 8.9–12.7)
POTASSIUM SERPL-SCNC: 4.3 MMOL/L (ref 3.5–5.3)
PROT SERPL-MCNC: 7 G/DL (ref 6.4–8.4)
RBC # BLD AUTO: 5.39 MILLION/UL (ref 3.88–5.62)
SODIUM SERPL-SCNC: 137 MMOL/L (ref 135–147)
TRIGL SERPL-MCNC: 65 MG/DL
WBC # BLD AUTO: 8.21 THOUSAND/UL (ref 4.31–10.16)

## 2023-05-08 ENCOUNTER — OFFICE VISIT (OUTPATIENT)
Dept: INTERNAL MEDICINE CLINIC | Facility: CLINIC | Age: 83
End: 2023-05-08

## 2023-05-08 VITALS
OXYGEN SATURATION: 98 % | SYSTOLIC BLOOD PRESSURE: 128 MMHG | HEIGHT: 67 IN | RESPIRATION RATE: 16 BRPM | WEIGHT: 172 LBS | DIASTOLIC BLOOD PRESSURE: 88 MMHG | BODY MASS INDEX: 27 KG/M2 | HEART RATE: 80 BPM

## 2023-05-08 DIAGNOSIS — R05.3 CHRONIC COUGH: ICD-10-CM

## 2023-05-08 DIAGNOSIS — I10 BENIGN ESSENTIAL HYPERTENSION: ICD-10-CM

## 2023-05-08 DIAGNOSIS — I48.91 ATRIAL FIBRILLATION, UNSPECIFIED TYPE (HCC): ICD-10-CM

## 2023-05-08 DIAGNOSIS — J84.10 PULMONARY FIBROSIS DETERMINED BY HIGH RESOLUTION COMPUTED TOMOGRAPHY (HCC): Primary | ICD-10-CM

## 2023-05-08 DIAGNOSIS — E78.2 MIXED HYPERLIPIDEMIA: ICD-10-CM

## 2023-05-08 NOTE — PATIENT INSTRUCTIONS
We will give trial of guaifenesin/DM tablets 1 twice a day  Also obtaining OTC famotidine (Pepcid) 20 mg and take 2 at bedtime  Continue other medications  Follow-up in 4 months, sooner as needed

## 2023-05-08 NOTE — PROGRESS NOTES
Assessment/Plan:   Patient Instructions   We will give trial of guaifenesin/DM tablets 1 twice a day  Also obtaining OTC famotidine (Pepcid) 20 mg and take 2 at bedtime  Continue other medications  Follow-up in 4 months, sooner as needed  Quality Measures:   Depression Screening and Follow-up Plan: Patient was screened for depression during today's encounter  They screened negative with a PHQ-2 score of 0  Return in about 4 months (around 9/8/2023) for Next scheduled follow up  Diagnoses and all orders for this visit:    Pulmonary fibrosis determined by high resolution computed tomography (HCC)  -     dextromethorphan-guaifenesin (MUCINEX DM)  MG per 12 hr tablet; Take 1 tablet by mouth every 12 (twelve) hours    Chronic cough  -     dextromethorphan-guaifenesin (MUCINEX DM)  MG per 12 hr tablet; Take 1 tablet by mouth every 12 (twelve) hours    Mixed hyperlipidemia    Benign essential hypertension  -     Comprehensive metabolic panel; Future    [facility] fibrillation, unspecified type ([facility] )        Subjective:      Patient ID: Jt Rome is a 80 y o  male  Follow-up, labs reviewed with patient, all show good control    Hypertension: Decent control on current medication  [place] cp, palp, sob, edema, HA, dizziness, diaphoresis, syncope, visual disturbance  Phonic atrial fibrillation: Intolerant to DOACs secondary to nosebleeds  On baby aspirin  Follows with cardiology  Hyperlipidemia: Good control on atorvastatin 40 mg daily  Tolerating the medication  Despite trial of multiple medications for this ongoing cough for now over 1 year plus referral to ENT plus referral to pulmonary, the only thing found so far is pulmonary fibrosis determined by high-resolution CT scan  This noted on the lower bases  Pulmonary put him on Breztri with minimal improvement  Continues to have coughing episodes that awaken him at night, and often are around mealtimes    Denies reflux symptoms  No significant reflux triggers noted  ALLERGIES:  No Known Allergies    CURRENT MEDICATIONS:    Current Outpatient Medications:   •  amLODIPine (NORVASC) 2 5 mg tablet, TAKE 1 TABLET BY MOUTH  DAILY, Disp: 90 tablet, Rfl: 3  •  aspirin (ECOTRIN LOW STRENGTH) 81 mg EC tablet, , Disp: , Rfl:   •  atenolol (TENORMIN) 50 mg tablet, Take 1 5 tablets (75 mg total) by mouth 2 (two) times a day (Patient taking differently: Take 125 mg by mouth daily), Disp: 60 tablet, Rfl:   •  atorvastatin (LIPITOR) 40 mg tablet, TAKE 1 TABLET BY MOUTH  DAILY AT BEDTIME, Disp: 90 tablet, Rfl: 3  •  Budeson-Glycopyrrol-Formoterol (Breztri Aerosphere) 160-9-4 8 MCG/ACT AERO, Inhale 2 puffs 2 (two) times a day Rinse mouth after use , Disp: 32 1 g, Rfl: 3  •  dextromethorphan-guaifenesin (MUCINEX DM)  MG per 12 hr tablet, Take 1 tablet by mouth every 12 (twelve) hours, Disp: 180 tablet, Rfl: 1  •  FLUoxetine (PROzac) 20 mg capsule, TAKE 1 CAPSULE BY MOUTH  DAILY, Disp: 90 capsule, Rfl: 3    ACTIVE PROBLEM LIST:  Patient Active Problem List   Diagnosis   • Benign essential hypertension   • Hyperlipidemia   • Anxiety disorder   • Atrial fibrillation (HCC)   • Chronic cough   • Pulmonary fibrosis determined by high resolution computed tomography [facility])       PAST MEDICAL HISTORY:  Past Medical History:   Diagnosis Date   • Nosebleed    • Retinal hemorrhage, left     last assessed: 9/2/2015       PAST SURGICAL HISTORY:  Past Surgical History:   Procedure Laterality Date   • LITHOTRIPSY     • TONSILLECTOMY         FAMILY HISTORY:  History reviewed  No pertinent family history      SOCIAL HISTORY:  Social History     Socioeconomic History   • Marital status: /Civil Union     Spouse name: Not on file   • Number of children: Not on file   • Years of education: Not on file   • Highest education level: Not on file   Occupational History   • Occupation: retired   Tobacco Use   • Smoking status: Former     Packs/day: 2 00 Years: 14 00     Pack years: 28 00     Types: Cigarettes     Start date: 5     Quit date: 1984     Years since quittin 5   • Smokeless tobacco: Never   Vaping Use   • Vaping Use: Never used   Substance and Sexual Activity   • Alcohol use: Never   • Drug use: No   • Sexual activity: Not Currently     Comment: denied: histroy of high risk sexual behavior   Other Topics Concern   • Not on file   Social History Narrative   • Not on file     Social Determinants of Health     Financial Resource Strain: Low Risk    • Difficulty of Paying Living Expenses: Not hard at all   Food Insecurity: Not on file   Transportation Needs: No Transportation Needs   • Lack of Transportation (Medical): No   • Lack of Transportation (Non-Medical): No   Physical Activity: Not on file   Stress: Not on file   Social Connections: Not on file   Intimate Partner Violence: Not on file   Housing Stability: Not on file       Review of Systems   Constitutional: Negative for activity change, chills, fatigue and fever  HENT: Positive for postnasal drip and rhinorrhea  Negative for congestion, sinus pressure and sinus pain  Eyes: Negative for discharge  Respiratory: Positive for cough and wheezing  Negative for chest tightness and shortness of breath  Cardiovascular: Negative for chest pain, palpitations and leg swelling  Gastrointestinal: Negative for abdominal pain, blood in stool, constipation, diarrhea, nausea and vomiting  Endocrine: Negative for polydipsia, polyphagia and polyuria  Genitourinary: Negative for difficulty urinating  Musculoskeletal: Negative for arthralgias and myalgias  Skin: Negative for rash  Allergic/Immunologic: Negative for immunocompromised state  Neurological: Negative for dizziness, syncope, weakness, light-headedness and headaches  Hematological: Negative for adenopathy  Does not bruise/bleed easily     Psychiatric/Behavioral: Negative for dysphoric mood, sleep disturbance and suicidal "ideas  The patient is not nervous/anxious  Objective:  Vitals:    05/08/23 1429 05/08/23 1504   BP: 128/88    BP Location: Right arm    Patient Position: Sitting    Pulse: (!) 118 80   Resp: 16    SpO2: 98%    Weight: 78 kg (172 lb)    Height: 5' 7\" (1 702 m)      Body mass index is 26 94 kg/m²  Physical Exam  Vitals and nursing note reviewed  Constitutional:       General: He is not in acute distress  Appearance: He is well-developed  HENT:      Head: Normocephalic and atraumatic  Eyes:      Extraocular Movements: Extraocular movements intact  Pupils: Pupils are equal, round, and reactive to light  Neck:      Thyroid: No thyromegaly  Vascular: No carotid bruit or JVD  Cardiovascular:      Rate and Rhythm: Normal rate  Rhythm irregularly irregular  Heart sounds: Normal heart sounds  Pulmonary:      Effort: Pulmonary effort is normal  No respiratory distress  Comments: Scattered rhonchi in the bases that clear with coughing  Few scattered wheezes that also clear with coughing  Musculoskeletal:      Cervical back: Neck supple  Right lower leg: No edema  Left lower leg: No edema  Lymphadenopathy:      Cervical: No cervical adenopathy  Skin:     General: Skin is warm and dry  Findings: No rash  Neurological:      General: No focal deficit present  Mental Status: He is alert and oriented to person, place, and time  Mental status is at baseline  Psychiatric:         Mood and Affect: Mood normal          Behavior: Behavior normal            RESULTS:    In chart    This note was created with voice recognition software  Phonic, grammatical and spelling errors may be present within the note as a result      "

## 2023-05-15 DIAGNOSIS — I10 ESSENTIAL HYPERTENSION: ICD-10-CM

## 2023-05-15 DIAGNOSIS — F41.9 ANXIETY: ICD-10-CM

## 2023-05-15 RX ORDER — AMLODIPINE BESYLATE 2.5 MG/1
TABLET ORAL
Qty: 90 TABLET | Refills: 3 | Status: SHIPPED | OUTPATIENT
Start: 2023-05-15

## 2023-05-15 RX ORDER — FLUOXETINE HYDROCHLORIDE 20 MG/1
CAPSULE ORAL
Qty: 90 CAPSULE | Refills: 3 | Status: SHIPPED | OUTPATIENT
Start: 2023-05-15

## 2023-06-06 ENCOUNTER — OFFICE VISIT (OUTPATIENT)
Age: 83
End: 2023-06-06
Payer: MEDICARE

## 2023-06-06 VITALS
TEMPERATURE: 98.3 F | BODY MASS INDEX: 26.06 KG/M2 | OXYGEN SATURATION: 98 % | DIASTOLIC BLOOD PRESSURE: 84 MMHG | SYSTOLIC BLOOD PRESSURE: 124 MMHG | HEART RATE: 102 BPM | HEIGHT: 67 IN | WEIGHT: 166 LBS

## 2023-06-06 DIAGNOSIS — J84.10 PULMONARY FIBROSIS DETERMINED BY HIGH RESOLUTION COMPUTED TOMOGRAPHY (HCC): ICD-10-CM

## 2023-06-06 DIAGNOSIS — J45.20 MILD INTERMITTENT REACTIVE AIRWAY DISEASE WITHOUT COMPLICATION: ICD-10-CM

## 2023-06-06 DIAGNOSIS — R05.3 CHRONIC COUGH: Primary | ICD-10-CM

## 2023-06-06 PROCEDURE — 99214 OFFICE O/P EST MOD 30 MIN: CPT | Performed by: PHYSICIAN ASSISTANT

## 2023-06-06 RX ORDER — MONTELUKAST SODIUM 10 MG/1
10 TABLET ORAL
Qty: 30 TABLET | Refills: 1 | Status: SHIPPED | OUTPATIENT
Start: 2023-06-06

## 2023-06-06 NOTE — PROGRESS NOTES
"Assessment/Plan:   Diagnoses and all orders for this visit:    Chronic cough  -     montelukast (SINGULAIR) 10 mg tablet; Take 1 tablet (10 mg total) by mouth daily at bedtime    Mild intermittent reactive airway disease without complication  -     montelukast (SINGULAIR) 10 mg tablet; Take 1 tablet (10 mg total) by mouth daily at bedtime    Pulmonary fibrosis determined by high resolution computed tomography [facility])    Patient is here today for follow-up  He was seen recently for complaint of cough that has been ongoing for the last 15 months or so  He has tried many different modalities, given several courses of prednisone, inhalers including most recently Breztri, gabapentin, Mucinex, recently tried on Pepcid as well  He has followed with ENT and has been trialed on some nasal sprays which have caused nosebleeds  He has not noted any significant improvement in his cough with any of the medications given to him  Notes cough is worse at night but does periodically have episodes throughout the day of coughing as well  He will stop the Comiso as he did not note any improvement and it is expensive for him, most recently was trialed on Mucinex as well as Pepcid thinking GERD could be contributing  He did not note any change in the cough with the addition of the Pepcid  We will trial him on Singulair, he has never noted significant allergies and never officially been diagnosed with asthma in the past but does note his father did have asthma  He will follow-up with us in about 3 months or sooner if no improvement in the cough  Return in about 3 months (around 9/6/2023)  All questions are answered to the patient's satisfaction and understanding  He verbalizes understanding  He is encouraged to call with any further questions or concerns  Portions of the record may have been created with voice recognition software    Occasional wrong word or \"sound a like\" substitutions may have occurred due to the " inherent limitations of voice recognition software  Read the chart carefully and recognize, using context, where substitutions have occurred  Electronically Signed by Moy Traylor PA-C    ______________________________________________________________________    Chief Complaint:   Chief Complaint   Patient presents with   • Follow-up   • Cough   • Shortness of Breath   • Wheezing       Patient ID: Margareth Anders is a 80 y o  y o  male has a past medical history of Nosebleed and Retinal hemorrhage, left  6/6/2023  Patient presents today for follow-up visit  Patient is an 72-year-old male non-smoker with past medical history of hypertension, A-fib, mild pulmonary fibrosis, chronic cough  He is here today for follow-up on his chronic cough  At his last visit he was started on Breztri  Has not noted any improvement in his cough with the addition of the Comiso  He has tried several medications over the last 15 months since the cough started  Has had courses of prednisone, albuterol inhaler, nasal sprays, gabapentin  He has followed with ENT  He was also recently tried on Mucinex and Pepcid by his PCP without any notable improvement in the cough  Reports the cough is worst at night, does periodically throughout the day have episodes of cough  He has tried several nasal sprays in the past but these have caused nosebleeds which required cauterization  Review of Systems   Constitutional: Negative  HENT: Positive for postnasal drip  Respiratory: Positive for cough  Cardiovascular: Negative  Gastrointestinal: Negative  Genitourinary: Negative  Musculoskeletal: Negative  Skin: Negative  Allergic/Immunologic: Negative  Neurological: Negative  Psychiatric/Behavioral: Negative  Smoking history: He reports that he quit smoking about 38 years ago  His smoking use included cigarettes  He started smoking about 53 years ago  He has a 28 00 pack-year smoking history   He has never "used smokeless tobacco     The following portions of the patient's history were reviewed and updated as appropriate: allergies, current medications, past family history, past medical history, past social history, past surgical history and problem list     Immunization History   Administered Date(s) Administered   • COVID-19 MODERNA VACC 0 5 ML IM 02/10/2021, 03/09/2021   • Tdap 10/16/2014     Current Outpatient Medications   Medication Sig Dispense Refill   • amLODIPine (NORVASC) 2 5 mg tablet TAKE 1 TABLET BY MOUTH  DAILY 90 tablet 3   • aspirin (ECOTRIN LOW STRENGTH) 81 mg EC tablet      • atenolol (TENORMIN) 50 mg tablet Take 1 5 tablets (75 mg total) by mouth 2 (two) times a day (Patient taking differently: Take 125 mg by mouth daily) 60 tablet    • atorvastatin (LIPITOR) 40 mg tablet TAKE 1 TABLET BY MOUTH  DAILY AT BEDTIME 90 tablet 3   • dextromethorphan-guaifenesin (MUCINEX DM)  MG per 12 hr tablet Take 1 tablet by mouth every 12 (twelve) hours 180 tablet 1   • FLUoxetine (PROzac) 20 mg capsule TAKE 1 CAPSULE BY MOUTH  DAILY 90 capsule 3   • montelukast (SINGULAIR) 10 mg tablet Take 1 tablet (10 mg total) by mouth daily at bedtime 30 tablet 1     No current facility-administered medications for this visit  Allergies: Patient has no known allergies  Objective:  Vitals:    06/06/23 1424   BP: 124/84   Pulse: 102   Temp: 98 3 °F (36 8 °C)   SpO2: 98%   Weight: 75 3 kg (166 lb)   Height: 5' 7\" (1 702 m)   Oxygen Therapy  SpO2: 98 %    Wt Readings from Last 3 Encounters:   06/06/23 75 3 kg (166 lb)   05/08/23 78 kg (172 lb)   04/04/23 80 2 kg (176 lb 12 8 oz)     Body mass index is 26 kg/m²  Physical Exam  Vitals reviewed  Constitutional:       Appearance: Normal appearance  He is well-developed  HENT:      Head: Normocephalic and atraumatic  Mouth/Throat:      Pharynx: Oropharynx is clear     Eyes:      Conjunctiva/sclera: Conjunctivae normal    Cardiovascular:      Rate and Rhythm: " Normal rate and regular rhythm  Pulmonary:      Effort: Pulmonary effort is normal       Breath sounds: Normal breath sounds  No decreased breath sounds, wheezing, rhonchi or rales  Abdominal:      General: Abdomen is flat  There is no distension  Musculoskeletal:         General: Normal range of motion  Cervical back: Normal range of motion  Right lower leg: No edema  Left lower leg: No edema  Skin:     General: Skin is warm and dry  Neurological:      Mental Status: He is alert and oriented to person, place, and time  Psychiatric:         Mood and Affect: Mood normal          Behavior: Behavior normal          Lab Review:   Lab Results   Component Value Date    BUN 20 05/06/2023    BUN 28 (H) 07/08/2015    CALCIUM 9 1 05/06/2023    CALCIUM 8 6 07/08/2015     (H) 05/06/2023     07/08/2015    CO2 26 05/06/2023    CO2 23 07/08/2015    CREATININE 1 12 05/06/2023    CREATININE 1 07 07/08/2015    GLUCOSE 66 07/08/2015    K 4 3 05/06/2023    K 4 3 07/08/2015     07/08/2015     Lab Results   Component Value Date    HCT 47 7 05/06/2023    HCT 47 3 07/08/2015    HGB 15 5 05/06/2023    HGB 15 8 07/08/2015    MCV 89 05/06/2023    MCV 89 07/08/2015     05/06/2023     07/08/2015    WBC 8 21 05/06/2023    WBC 7 22 07/08/2015       Diagnostics:  I have personally reviewed pertinent reports  and I have personally reviewed pertinent films in PACS  Reviewed CT scan and PFT  Office Spirometry Results:     ESS:    No results found

## 2023-09-27 ENCOUNTER — APPOINTMENT (OUTPATIENT)
Age: 83
End: 2023-09-27
Payer: MEDICARE

## 2023-09-27 DIAGNOSIS — I10 BENIGN ESSENTIAL HYPERTENSION: ICD-10-CM

## 2023-09-27 LAB
ALBUMIN SERPL BCP-MCNC: 3.7 G/DL (ref 3.5–5)
ALP SERPL-CCNC: 83 U/L (ref 34–104)
ALT SERPL W P-5'-P-CCNC: 12 U/L (ref 7–52)
ANION GAP SERPL CALCULATED.3IONS-SCNC: 11 MMOL/L
AST SERPL W P-5'-P-CCNC: 15 U/L (ref 13–39)
BILIRUB SERPL-MCNC: 1.04 MG/DL (ref 0.2–1)
BUN SERPL-MCNC: 21 MG/DL (ref 5–25)
CALCIUM SERPL-MCNC: 9 MG/DL (ref 8.4–10.2)
CHLORIDE SERPL-SCNC: 104 MMOL/L (ref 96–108)
CO2 SERPL-SCNC: 26 MMOL/L (ref 21–32)
CREAT SERPL-MCNC: 1.18 MG/DL (ref 0.6–1.3)
GFR SERPL CREATININE-BSD FRML MDRD: 56 ML/MIN/1.73SQ M
GLUCOSE P FAST SERPL-MCNC: 84 MG/DL (ref 65–99)
POTASSIUM SERPL-SCNC: 4.2 MMOL/L (ref 3.5–5.3)
PROT SERPL-MCNC: 6.6 G/DL (ref 6.4–8.4)
SODIUM SERPL-SCNC: 141 MMOL/L (ref 135–147)

## 2023-09-27 PROCEDURE — 80053 COMPREHEN METABOLIC PANEL: CPT

## 2023-09-27 PROCEDURE — 36415 COLL VENOUS BLD VENIPUNCTURE: CPT

## 2023-09-29 ENCOUNTER — OFFICE VISIT (OUTPATIENT)
Dept: INTERNAL MEDICINE CLINIC | Facility: CLINIC | Age: 83
End: 2023-09-29
Payer: MEDICARE

## 2023-09-29 VITALS
HEIGHT: 67 IN | RESPIRATION RATE: 20 BRPM | OXYGEN SATURATION: 97 % | TEMPERATURE: 96.6 F | HEART RATE: 114 BPM | WEIGHT: 163 LBS | BODY MASS INDEX: 25.58 KG/M2 | SYSTOLIC BLOOD PRESSURE: 134 MMHG | DIASTOLIC BLOOD PRESSURE: 82 MMHG

## 2023-09-29 DIAGNOSIS — J01.40 ACUTE NON-RECURRENT PANSINUSITIS: Primary | ICD-10-CM

## 2023-09-29 DIAGNOSIS — R05.3 CHRONIC COUGH: ICD-10-CM

## 2023-09-29 PROCEDURE — 99213 OFFICE O/P EST LOW 20 MIN: CPT | Performed by: PHYSICIAN ASSISTANT

## 2023-09-29 RX ORDER — AZELASTINE 1 MG/ML
1 SPRAY, METERED NASAL 2 TIMES DAILY
Qty: 30 ML | Refills: 1 | Status: SHIPPED | OUTPATIENT
Start: 2023-09-29

## 2023-09-29 RX ORDER — CEFDINIR 300 MG/1
300 CAPSULE ORAL EVERY 12 HOURS SCHEDULED
Qty: 20 CAPSULE | Refills: 0 | Status: SHIPPED | OUTPATIENT
Start: 2023-09-29 | End: 2023-10-09

## 2023-09-29 NOTE — PATIENT INSTRUCTIONS
Start antibiotic, take twice a day with food. Always wise to take a probiotic and or eat yogurt daily when on an antibiotic. Can give trial of Astelin nasal spray, 1 spray each nostril twice daily as I demonstrated and instructed. Use for approximately 6 days to see if we can get any improvement in the postnasal drip causing your coughing. If so we could use this intermittently. Keep me informed as to whether or not this is effective.

## 2023-09-29 NOTE — PROGRESS NOTES
Assessment/Plan:   Patient Instructions   Start antibiotic, take twice a day with food. Always wise to take a probiotic and or eat yogurt daily when on an antibiotic. Can give trial of Astelin nasal spray, 1 spray each nostril twice daily as I demonstrated and instructed. Use for approximately 6 days to see if we can get any improvement in the postnasal drip causing your coughing. If so we could use this intermittently. Keep me informed as to whether or not this is effective. Quality Measures:   Depression Screening and Follow-up Plan: Patient was screened for depression during today's encounter. They screened negative with a PHQ-2 score of 0. Return if symptoms worsen or fail to improve, for Next scheduled follow up. Diagnoses and all orders for this visit:    Acute non-recurrent pansinusitis  -     cefdinir (OMNICEF) 300 mg capsule; Take 1 capsule (300 mg total) by mouth every 12 (twelve) hours for 10 days    Chronic cough  -     azelastine (ASTELIN) 0.1 % nasal spray; 1 spray into each nostril 2 (two) times a day Use in each nostril as directed          Subjective:      Patient ID: Rand Solis is a 80 y.o. male. Acute visit    Patient with chronic postnasal drip and cough over the last year that has not been improving with any treatment modalities including antibiotics, steroids, inhaled medications, evaluations by ENT and pulmonary, presents with worsening cough, head congestion, worsening postnasal drip. He is concerned because this has been progressing over 2 weeks and in 2 weeks he has a wedding to attend. He does not want to be sick for the wedding. No documented fever or chills. No earaches.       ALLERGIES:  No Known Allergies    CURRENT MEDICATIONS:    Current Outpatient Medications:   •  amLODIPine (NORVASC) 2.5 mg tablet, TAKE 1 TABLET BY MOUTH  DAILY, Disp: 90 tablet, Rfl: 3  •  aspirin (ECOTRIN LOW STRENGTH) 81 mg EC tablet, , Disp: , Rfl:   •  atenolol (TENORMIN) 50 mg tablet, Take 1.5 tablets (75 mg total) by mouth 2 (two) times a day (Patient taking differently: Take 125 mg by mouth daily), Disp: 60 tablet, Rfl:   •  atorvastatin (LIPITOR) 40 mg tablet, TAKE 1 TABLET BY MOUTH  DAILY AT BEDTIME, Disp: 90 tablet, Rfl: 3  •  azelastine (ASTELIN) 0.1 % nasal spray, 1 spray into each nostril 2 (two) times a day Use in each nostril as directed, Disp: 30 mL, Rfl: 1  •  cefdinir (OMNICEF) 300 mg capsule, Take 1 capsule (300 mg total) by mouth every 12 (twelve) hours for 10 days, Disp: 20 capsule, Rfl: 0  •  dextromethorphan-guaifenesin (MUCINEX DM)  MG per 12 hr tablet, Take 1 tablet by mouth every 12 (twelve) hours, Disp: 180 tablet, Rfl: 1  •  FLUoxetine (PROzac) 20 mg capsule, TAKE 1 CAPSULE BY MOUTH  DAILY, Disp: 90 capsule, Rfl: 3    ACTIVE PROBLEM LIST:  Patient Active Problem List   Diagnosis   • Benign essential hypertension   • Hyperlipidemia   • Anxiety disorder   • Atrial fibrillation (HCC)   • Chronic cough   • Pulmonary fibrosis determined by high resolution computed tomography [facility])       PAST MEDICAL HISTORY:  Past Medical History:   Diagnosis Date   • Nosebleed    • Retinal hemorrhage, left     last assessed: 2015       PAST SURGICAL HISTORY:  Past Surgical History:   Procedure Laterality Date   • LITHOTRIPSY     • TONSILLECTOMY         FAMILY HISTORY:  History reviewed. No pertinent family history.     SOCIAL HISTORY:  Social History     Socioeconomic History   • Marital status: /Civil Union     Spouse name: Not on file   • Number of children: Not on file   • Years of education: Not on file   • Highest education level: Not on file   Occupational History   • Occupation: retired   Tobacco Use   • Smoking status: Former     Packs/day: 2.00     Years: 14.00     Total pack years: 28.00     Types: Cigarettes     Start date:      Quit date: 1984     Years since quittin.9   • Smokeless tobacco: Never   Vaping Use   • Vaping Use: Never used Substance and Sexual Activity   • Alcohol use: Never   • Drug use: No   • Sexual activity: Not Currently     Comment: denied: histroy of high risk sexual behavior   Other Topics Concern   • Not on file   Social History Narrative   • Not on file     Social Determinants of Health     Financial Resource Strain: Low Risk  (2/6/2023)    Overall Financial Resource Strain (CARDIA)    • Difficulty of Paying Living Expenses: Not hard at all   Food Insecurity: Not on file   Transportation Needs: No Transportation Needs (2/6/2023)    PRAPARE - Transportation    • Lack of Transportation (Medical): No    • Lack of Transportation (Non-Medical): No   Physical Activity: Not on file   Stress: Not on file   Social Connections: Not on file   Intimate Partner Violence: Not on file   Housing Stability: Not on file       Review of Systems   Constitutional: Negative for activity change, chills, fatigue and fever. HENT: Positive for congestion, postnasal drip and rhinorrhea. Negative for hearing loss, sinus pressure, sinus pain, sneezing and sore throat. Eyes: Negative for discharge. Respiratory: Positive for cough. Negative for chest tightness, shortness of breath and wheezing. Cardiovascular: Negative for chest pain, palpitations and leg swelling. Gastrointestinal: Negative for abdominal pain. Genitourinary: Negative for difficulty urinating. Musculoskeletal: Negative for arthralgias and myalgias. Skin: Negative for rash. Allergic/Immunologic: Negative for immunocompromised state. Neurological: Negative for dizziness, syncope, weakness, light-headedness and headaches. Hematological: Negative for adenopathy. Does not bruise/bleed easily. Psychiatric/Behavioral: Negative for dysphoric mood. The patient is not nervous/anxious.           Objective:  Vitals:    09/29/23 1004   BP: 134/82   BP Location: Right arm   Patient Position: Sitting   Cuff Size: Adult   Pulse: (!) 114   Resp: 20   Temp: (!) 96.6 °F (35.9 °C) TempSrc: Tympanic   SpO2: 97%   Weight: 73.9 kg (163 lb)   Height: 5' 7" (1.702 m)     Body mass index is 25.53 kg/m². Physical Exam  Vitals and nursing note reviewed. Constitutional:       General: He is not in acute distress. Appearance: He is well-developed. He is not ill-appearing. HENT:      Head: Normocephalic and atraumatic. Comments: Pale nasal mucosa     Nose: Congestion and rhinorrhea present. Mouth/Throat:      Mouth: Mucous membranes are moist.      Pharynx: Oropharynx is clear. Eyes:      Extraocular Movements: Extraocular movements intact. Conjunctiva/sclera: Conjunctivae normal.      Pupils: Pupils are equal, round, and reactive to light. Neck:      Thyroid: No thyromegaly. Vascular: No carotid bruit or JVD. Cardiovascular:      Rate and Rhythm: Normal rate and regular rhythm. Heart sounds: Normal heart sounds. Pulmonary:      Effort: Pulmonary effort is normal. No respiratory distress. Comments: Coarse breath sounds throughout, crackles present right base compared to left. Previous CT chest showed changes of fibrosis. Abdominal:      Tenderness: There is no abdominal tenderness. Musculoskeletal:      Cervical back: Neck supple. Right lower leg: No edema. Left lower leg: No edema. Lymphadenopathy:      Cervical: No cervical adenopathy. Skin:     General: Skin is warm and dry. Findings: No rash. Neurological:      General: No focal deficit present. Mental Status: He is alert and oriented to person, place, and time. Mental status is at baseline.    Psychiatric:         Mood and Affect: Mood normal.         Behavior: Behavior normal.           RESULTS:  Hemoglobin A1C   Date/Time Value Ref Range Status   05/17/2019 04:11 PM 5.6 6.5 Final   09/02/2015 09:46 AM 5.4 4.0 - 5.6 % Final     Comment:     5.7-6.4% impaired fasting glucose  >=6.5% diagnosis of diabetes  Falsely low levels are seen in conditions linked to short RBC life span  ï¿½ hemolytic anemia, and splenomegaly  Falsely elevated levels are seen in situations where there is an  increased production of RBC ï¿½ receipt of erythropoietin or blood  transfusions  Adopted from ADA-Clinical Practice Recommendations       Cholesterol   Date/Time Value Ref Range Status   05/06/2023 09:47  See Comment mg/dL Final     Comment:     Cholesterol:         Pediatric <18 Years        Desirable          <170 mg/dL      Borderline High    170-199 mg/dL      High               >=200 mg/dL        Adult >=18 Years            Desirable        <200 mg/dL      Borderline High  200-239 mg/dL      High             >239 mg/dL       Triglycerides   Date/Time Value Ref Range Status   05/06/2023 09:47 AM 65 See Comment mg/dL Final     Comment:     Triglyceride:     0-9Y            <75mg/dL     10Y-17Y         <90 mg/dL       >=18Y     Normal          <150 mg/dL     Borderline High 150-199 mg/dL     High            200-499 mg/dL        Very High       >499 mg/dL    Specimen collection should occur prior to N-Acetylcysteine or Metamizole administration due to the potential for falsely depressed results. 07/08/2015 10:28 AM 99 mg/dL Final     Comment:     TRIGLYCERIDE:       Normal                 <150 mg/dl       Borderline High       150-199 mg/dl       High                  200-499 mg/dl       Very High             >499 mg/dl  _______________________________________       HDL   Date/Time Value Ref Range Status   07/08/2015 10:28 AM 69 mg/dL Final     Comment:     HDL:       High       >59 mg/dl       Low        <41 mg/dl  ______________________________       HDL, Direct   Date/Time Value Ref Range Status   05/06/2023 09:47 AM 67 >=40 mg/dL Final     Comment:     Specimen collection should occur prior to Metamizole administration due to the potential for falsley depressed results.      LDL Calculated   Date/Time Value Ref Range Status   05/06/2023 09:47 AM 68 0 - 100 mg/dL Final     Comment:     LDL Cholesterol:     Optimal           <100 mg/dl     Near Optimal      100-129 mg/dl     Above Optimal       Borderline High 130-159 mg/dl       High            160-189 mg/dl       Very High       >189 mg/dl         This screening LDL is a calculated result. It does not have the accuracy of the Direct Measured LDL in the monitoring of patients with hyperlipidemia and/or statin therapy. Direct Measure LDL (LFG030) must be ordered separately in these patients. 07/08/2015 10:28 AM 85 0 - 100 mg/dL Final     Comment:     LDL, CALCULATED:     This screening LDL is a calculated result. It does not have the accuracy of the Direct Measured LDL in the  monitoring of patients with hyperlipidemia and/or statin therapy. Direct Measured LDL (Test Code 3126) must be ordered separately in these  patients.  ______________________________  The above 4 analytes were performed by [address] [address]       Hemoglobin   Date/Time Value Ref Range Status   05/06/2023 09:47 AM 15.5 12.0 - 17.0 g/dL Final   07/08/2015 10:28 AM 15.8 12.0 - 17.0 g/dL Final     Hematocrit   Date/Time Value Ref Range Status   05/06/2023 09:47 AM 47.7 36.5 - 49.3 % Final   07/08/2015 10:28 AM 47.3 36.5 - 49.3 % Final     Platelets   Date/Time Value Ref Range Status   05/06/2023 09:47  149 - 390 Thousands/uL Final   07/08/2015 10:28  149 - 390 Thousand/uL Final     PSA   Date/Time Value Ref Range Status   05/14/2019 10:39 AM 0.7 0.0 - 4.0 ng/mL Final     Comment:       American Urological Association Guidelines define biochemical recurrence of prostate cancer as a detectable or rising PSA value post-radical prostatectomy that is greater than or equal to 0.2 ng/mL with a second confirmatory level of greater than or equal to 0.2 ng/mL.      TSH 3RD GENERATON   Date/Time Value Ref Range Status   12/31/2021 10:19 AM 3.440 0.358 - 3.740 uIU/mL Final     Free T4   Date/Time Value Ref Range Status   12/18/2020 09:39 AM 0.79 0.76 - 1.46 ng/dL Final     Comment:     Specimen collection should occur prior to Sulfasalazine administration due to the potential for falsely elevated results. Sodium   Date/Time Value Ref Range Status   09/27/2023 10:27  135 - 147 mmol/L Final     BUN   Date/Time Value Ref Range Status   09/27/2023 10:27 AM 21 5 - 25 mg/dL Final   07/08/2015 10:28 AM 28 (H) 5 - 25 mg/dL Final     Creatinine   Date/Time Value Ref Range Status   09/27/2023 10:27 AM 1.18 0.60 - 1.30 mg/dL Final     Comment:     Standardized to IDMS reference method   07/08/2015 10:28 AM 1.07 0.60 - 1.30 mg/dL Final     Comment:     Standardized to IDMS reference method      In chart    This note was created with voice recognition software. Phonic, grammatical and spelling errors may be present within the note as a result.

## 2024-02-07 ENCOUNTER — OFFICE VISIT (OUTPATIENT)
Dept: INTERNAL MEDICINE CLINIC | Facility: CLINIC | Age: 84
End: 2024-02-07
Payer: MEDICARE

## 2024-02-07 ENCOUNTER — TELEPHONE (OUTPATIENT)
Dept: INTERNAL MEDICINE CLINIC | Facility: CLINIC | Age: 84
End: 2024-02-07

## 2024-02-07 VITALS
HEART RATE: 107 BPM | SYSTOLIC BLOOD PRESSURE: 122 MMHG | TEMPERATURE: 100.3 F | BODY MASS INDEX: 25.9 KG/M2 | HEIGHT: 67 IN | DIASTOLIC BLOOD PRESSURE: 72 MMHG | WEIGHT: 165 LBS | OXYGEN SATURATION: 96 %

## 2024-02-07 DIAGNOSIS — R05.1 ACUTE COUGH: ICD-10-CM

## 2024-02-07 DIAGNOSIS — J84.10 PULMONARY FIBROSIS DETERMINED BY HIGH RESOLUTION COMPUTED TOMOGRAPHY (HCC): ICD-10-CM

## 2024-02-07 DIAGNOSIS — R50.9 FEVER, UNSPECIFIED FEVER CAUSE: Primary | ICD-10-CM

## 2024-02-07 LAB
SARS-COV-2 AG UPPER RESP QL IA: NEGATIVE
SL AMB POCT RAPID FLU A: NEGATIVE
SL AMB POCT RAPID FLU B: NEGATIVE
VALID CONTROL: NORMAL

## 2024-02-07 PROCEDURE — 87811 SARS-COV-2 COVID19 W/OPTIC: CPT | Performed by: INTERNAL MEDICINE

## 2024-02-07 PROCEDURE — 87804 INFLUENZA ASSAY W/OPTIC: CPT | Performed by: INTERNAL MEDICINE

## 2024-02-07 PROCEDURE — 99214 OFFICE O/P EST MOD 30 MIN: CPT | Performed by: INTERNAL MEDICINE

## 2024-02-07 RX ORDER — CEFUROXIME AXETIL 500 MG/1
500 TABLET ORAL EVERY 12 HOURS SCHEDULED
Qty: 20 TABLET | Refills: 0 | Status: SHIPPED | OUTPATIENT
Start: 2024-02-07 | End: 2024-02-17

## 2024-02-07 RX ORDER — FEXOFENADINE HCL AND PSEUDOEPHEDRINE HCI 180; 240 MG/1; MG/1
1 TABLET, EXTENDED RELEASE ORAL DAILY
COMMUNITY

## 2024-02-07 RX ORDER — FAMOTIDINE 20 MG/1
20 TABLET, FILM COATED ORAL DAILY
COMMUNITY

## 2024-02-07 NOTE — PROGRESS NOTES
Assessment/Plan:     Acute exacerbation now.  Will treat with antibiotics.  The hemoptysis is probably from the coughing and just superficial vessels.  But if he is not improving in about 2 days with the antibiotics, he will go for the chest x-ray.     Quality Measures:       No follow-ups on file.    No problem-specific Assessment & Plan notes found for this encounter.       Diagnoses and all orders for this visit:    Fever, unspecified fever cause  -     cefuroxime (CEFTIN) 500 mg tablet; Take 1 tablet (500 mg total) by mouth every 12 (twelve) hours for 10 days  -     XR chest pa & lateral; Future  -     POCT Rapid Covid Ag  -     POCT rapid flu A and B    Acute cough  -     cefuroxime (CEFTIN) 500 mg tablet; Take 1 tablet (500 mg total) by mouth every 12 (twelve) hours for 10 days  -     XR chest pa & lateral; Future  -     POCT Rapid Covid Ag  -     POCT rapid flu A and B    Pulmonary fibrosis determined by high resolution computed tomography (HCC)    Other orders  -     famotidine (PEPCID) 20 mg tablet; Take 20 mg by mouth daily  -     fexofenadine-pseudoephedrine (ALLEGRA-D 24) 180-240 MG per 24 hr tablet; Take 1 tablet by mouth daily          Subjective:      Patient ID: Raj Krause is a 83 y.o. male.    Patient comes in today complaining of worsening of his chronic cough.  Now productive.  Also getting some flecks of blood after coughing severely through the night.  He has been trying his usual medicines.  Has a fever now.  He did a COVID test this morning which was negative.  We tested him for COVID and flu here.  Both negative.  Feels fatigued.  Definite change in his status within the last week.        ALLERGIES:  No Known Allergies    CURRENT MEDICATIONS:    Current Outpatient Medications:   •  amLODIPine (NORVASC) 2.5 mg tablet, TAKE 1 TABLET BY MOUTH  DAILY, Disp: 90 tablet, Rfl: 3  •  aspirin (ECOTRIN LOW STRENGTH) 81 mg EC tablet, , Disp: , Rfl:   •  atenolol (TENORMIN) 50 mg tablet, Take 1.5  tablets (75 mg total) by mouth 2 (two) times a day (Patient taking differently: Take 125 mg by mouth daily), Disp: 60 tablet, Rfl:   •  atorvastatin (LIPITOR) 40 mg tablet, TAKE 1 TABLET BY MOUTH  DAILY AT BEDTIME, Disp: 90 tablet, Rfl: 3  •  cefuroxime (CEFTIN) 500 mg tablet, Take 1 tablet (500 mg total) by mouth every 12 (twelve) hours for 10 days, Disp: 20 tablet, Rfl: 0  •  dextromethorphan-guaifenesin (MUCINEX DM)  MG per 12 hr tablet, Take 1 tablet by mouth every 12 (twelve) hours, Disp: 180 tablet, Rfl: 1  •  famotidine (PEPCID) 20 mg tablet, Take 20 mg by mouth daily, Disp: , Rfl:   •  fexofenadine-pseudoephedrine (ALLEGRA-D 24) 180-240 MG per 24 hr tablet, Take 1 tablet by mouth daily, Disp: , Rfl:   •  FLUoxetine (PROzac) 20 mg capsule, TAKE 1 CAPSULE BY MOUTH  DAILY, Disp: 90 capsule, Rfl: 3  •  azelastine (ASTELIN) 0.1 % nasal spray, 1 spray into each nostril 2 (two) times a day Use in each nostril as directed (Patient not taking: Reported on 2/7/2024), Disp: 30 mL, Rfl: 1    ACTIVE PROBLEM LIST:  Patient Active Problem List   Diagnosis   • Benign essential hypertension   • Hyperlipidemia   • Anxiety disorder   • Atrial fibrillation (HCC)   • Chronic cough   • Pulmonary fibrosis determined by high resolution computed tomography (HCC)       PAST MEDICAL HISTORY:  Past Medical History:   Diagnosis Date   • Nosebleed    • Retinal hemorrhage, left     last assessed: 9/2/2015       PAST SURGICAL HISTORY:  Past Surgical History:   Procedure Laterality Date   • LITHOTRIPSY     • TONSILLECTOMY         FAMILY HISTORY:  No family history on file.    SOCIAL HISTORY:  Social History     Socioeconomic History   • Marital status: /Civil Union     Spouse name: Not on file   • Number of children: Not on file   • Years of education: Not on file   • Highest education level: Not on file   Occupational History   • Occupation: retired   Tobacco Use   • Smoking status: Former     Current packs/day: 0.00      "Average packs/day: 2.0 packs/day for 14.8 years (29.7 ttl pk-yrs)     Types: Cigarettes     Start date:      Quit date: 1984     Years since quittin.2   • Smokeless tobacco: Never   Vaping Use   • Vaping status: Never Used   Substance and Sexual Activity   • Alcohol use: Never   • Drug use: No   • Sexual activity: Not Currently     Comment: denied: histroy of high risk sexual behavior   Other Topics Concern   • Not on file   Social History Narrative   • Not on file     Social Determinants of Health     Financial Resource Strain: Low Risk  (2023)    Overall Financial Resource Strain (CARDIA)    • Difficulty of Paying Living Expenses: Not hard at all   Food Insecurity: Not on file   Transportation Needs: No Transportation Needs (2023)    PRAPARE - Transportation    • Lack of Transportation (Medical): No    • Lack of Transportation (Non-Medical): No   Physical Activity: Not on file   Stress: Not on file   Social Connections: Not on file   Intimate Partner Violence: Not on file   Housing Stability: Not on file       Review of Systems   Constitutional:  Positive for fever.   HENT:  Positive for congestion.    Respiratory:  Positive for cough.          Objective:  Vitals:    24 1311   BP: 122/72   BP Location: Left arm   Patient Position: Sitting   Cuff Size: Standard   Pulse: (!) 107   Temp: 100.3 °F (37.9 °C)   SpO2: 96%   Weight: 74.8 kg (165 lb)   Height: 5' 7\" (1.702 m)     Body mass index is 25.84 kg/m².     Physical Exam  Vitals and nursing note reviewed.   Constitutional:       Appearance: Normal appearance. He is ill-appearing.   Pulmonary:      Effort: Pulmonary effort is normal.      Breath sounds: Wheezing present. No rhonchi or rales.   Neurological:      Mental Status: He is alert.           RESULTS:  Hemoglobin A1C   Date/Time Value Ref Range Status   2019 04:11 PM 5.6 6.5 Final   2015 09:46 AM 5.4 4.0 - 5.6 % Final     Comment:     5.7-6.4% impaired fasting " glucose  >=6.5% diagnosis of diabetes  Falsely low levels are seen in conditions linked to short RBC life span  ï¿½ hemolytic anemia, and splenomegaly  Falsely elevated levels are seen in situations where there is an  increased production of RBC ï¿½ receipt of erythropoietin or blood  transfusions  Adopted from ADA-Clinical Practice Recommendations       Cholesterol   Date/Time Value Ref Range Status   05/06/2023 09:47  See Comment mg/dL Final     Comment:     Cholesterol:         Pediatric <18 Years        Desirable          <170 mg/dL      Borderline High    170-199 mg/dL      High               >=200 mg/dL        Adult >=18 Years            Desirable        <200 mg/dL      Borderline High  200-239 mg/dL      High             >239 mg/dL       Triglycerides   Date/Time Value Ref Range Status   05/06/2023 09:47 AM 65 See Comment mg/dL Final     Comment:     Triglyceride:     0-9Y            <75mg/dL     10Y-17Y         <90 mg/dL       >=18Y     Normal          <150 mg/dL     Borderline High 150-199 mg/dL     High            200-499 mg/dL        Very High       >499 mg/dL    Specimen collection should occur prior to N-Acetylcysteine or Metamizole administration due to the potential for falsely depressed results.   07/08/2015 10:28 AM 99 mg/dL Final     Comment:     TRIGLYCERIDE:       Normal                 <150 mg/dl       Borderline High       150-199 mg/dl       High                  200-499 mg/dl       Very High             >499 mg/dl  _______________________________________       HDL   Date/Time Value Ref Range Status   07/08/2015 10:28 AM 69 mg/dL Final     Comment:     HDL:       High       >59 mg/dl       Low        <41 mg/dl  ______________________________       HDL, Direct   Date/Time Value Ref Range Status   05/06/2023 09:47 AM 67 >=40 mg/dL Final     Comment:     Specimen collection should occur prior to Metamizole administration due to the potential for falsley depressed results.     LDL Calculated    Date/Time Value Ref Range Status   05/06/2023 09:47 AM 68 0 - 100 mg/dL Final     Comment:     LDL Cholesterol:     Optimal           <100 mg/dl     Near Optimal      100-129 mg/dl     Above Optimal       Borderline High 130-159 mg/dl       High            160-189 mg/dl       Very High       >189 mg/dl         This screening LDL is a calculated result.   It does not have the accuracy of the Direct Measured LDL in the monitoring of patients with hyperlipidemia and/or statin therapy.   Direct Measure LDL (EPT682) must be ordered separately in these patients.   07/08/2015 10:28 AM 85 0 - 100 mg/dL Final     Comment:     LDL, CALCULATED:     This screening LDL is a calculated result.  It does not have the accuracy of the Direct Measured LDL in the  monitoring of patients with hyperlipidemia and/or statin therapy.  Direct Measured LDL (Test Code 3126) must be ordered separately in these  patients.  ______________________________  The above 4 analytes were performed by [facility]  [address] [place], [place] [address] [address]       Hemoglobin   Date/Time Value Ref Range Status   05/06/2023 09:47 AM 15.5 12.0 - 17.0 g/dL Final   07/08/2015 10:28 AM 15.8 12.0 - 17.0 g/dL Final     Hematocrit   Date/Time Value Ref Range Status   05/06/2023 09:47 AM 47.7 36.5 - 49.3 % Final   07/08/2015 10:28 AM 47.3 36.5 - 49.3 % Final     Platelets   Date/Time Value Ref Range Status   05/06/2023 09:47  149 - 390 Thousands/uL Final   07/08/2015 10:28  149 - 390 Thousand/uL Final     PSA   Date/Time Value Ref Range Status   05/14/2019 10:39 AM 0.7 0.0 - 4.0 ng/mL Final     Comment:       American Urological Association Guidelines define biochemical recurrence of prostate cancer as a detectable or rising PSA value post-radical prostatectomy that is greater than or equal to 0.2 ng/mL with a second confirmatory level of greater than or equal to 0.2 ng/mL.     TSH 3RD GENERATON   Date/Time Value Ref Range Status   12/31/2021 10:19 AM 3.440  0.358 - 3.740 uIU/mL Final     Free T4   Date/Time Value Ref Range Status   12/18/2020 09:39 AM 0.79 0.76 - 1.46 ng/dL Final     Comment:     Specimen collection should occur prior to Sulfasalazine administration due to the potential for falsely elevated results.     Sodium   Date/Time Value Ref Range Status   09/27/2023 10:27  135 - 147 mmol/L Final   04/18/2019 03:44  135 - 145 mmol/L Final     BUN   Date/Time Value Ref Range Status   09/27/2023 10:27 AM 21 5 - 25 mg/dL Final   04/18/2019 03:44 PM 22 7 - 28 mg/dL Final     Creatinine   Date/Time Value Ref Range Status   09/27/2023 10:27 AM 1.18 0.60 - 1.30 mg/dL Final     Comment:     Standardized to IDMS reference method   06/15/2020 11:27 AM 0.98 0.53 - 1.30 mg/dL Final      In chart    This note was created with voice recognition software.  Phonic, grammatical and spelling errors may be present within the note as a result.

## 2024-02-07 NOTE — TELEPHONE ENCOUNTER
Wife called about having him get something for the cough?     She says that's the main reason he came

## 2024-03-13 DIAGNOSIS — E78.2 MIXED HYPERLIPIDEMIA: ICD-10-CM

## 2024-03-14 RX ORDER — ATORVASTATIN CALCIUM 40 MG/1
TABLET, FILM COATED ORAL
Qty: 90 TABLET | Refills: 3 | Status: SHIPPED | OUTPATIENT
Start: 2024-03-14

## 2024-05-24 DIAGNOSIS — F41.9 ANXIETY: ICD-10-CM

## 2024-05-24 DIAGNOSIS — I10 ESSENTIAL HYPERTENSION: ICD-10-CM

## 2024-05-25 RX ORDER — FLUOXETINE HYDROCHLORIDE 20 MG/1
CAPSULE ORAL
Qty: 90 CAPSULE | Refills: 1 | Status: SHIPPED | OUTPATIENT
Start: 2024-05-25

## 2024-05-25 RX ORDER — AMLODIPINE BESYLATE 2.5 MG/1
TABLET ORAL
Qty: 90 TABLET | Refills: 1 | Status: SHIPPED | OUTPATIENT
Start: 2024-05-25

## 2024-06-05 ENCOUNTER — RA CDI HCC (OUTPATIENT)
Dept: OTHER | Facility: HOSPITAL | Age: 84
End: 2024-06-05

## 2024-06-12 ENCOUNTER — OFFICE VISIT (OUTPATIENT)
Dept: INTERNAL MEDICINE CLINIC | Facility: CLINIC | Age: 84
End: 2024-06-12
Payer: MEDICARE

## 2024-06-12 VITALS
WEIGHT: 156 LBS | DIASTOLIC BLOOD PRESSURE: 72 MMHG | BODY MASS INDEX: 24.48 KG/M2 | HEART RATE: 117 BPM | SYSTOLIC BLOOD PRESSURE: 122 MMHG | OXYGEN SATURATION: 96 % | HEIGHT: 67 IN | TEMPERATURE: 97 F

## 2024-06-12 DIAGNOSIS — R05.3 CHRONIC COUGH: Primary | ICD-10-CM

## 2024-06-12 DIAGNOSIS — I10 BENIGN ESSENTIAL HYPERTENSION: ICD-10-CM

## 2024-06-12 DIAGNOSIS — E78.2 MIXED HYPERLIPIDEMIA: ICD-10-CM

## 2024-06-12 DIAGNOSIS — R44.1 VISUAL HALLUCINATION: ICD-10-CM

## 2024-06-12 DIAGNOSIS — J84.10 PULMONARY FIBROSIS DETERMINED BY HIGH RESOLUTION COMPUTED TOMOGRAPHY (HCC): ICD-10-CM

## 2024-06-12 PROCEDURE — 99214 OFFICE O/P EST MOD 30 MIN: CPT | Performed by: INTERNAL MEDICINE

## 2024-06-12 PROCEDURE — G2211 COMPLEX E/M VISIT ADD ON: HCPCS | Performed by: INTERNAL MEDICINE

## 2024-06-12 NOTE — PROGRESS NOTES
Ambulatory Visit  Name: Raj Krause      : 1940      MRN: [id number]  Encounter Provider: Jj Jerez MD  Encounter Date: 2024   Encounter department: [facility] INTERNAL MEDICINE [place]    Assessment & Plan   1. Chronic cough  Assessment & Plan:  He has had an extensive workup.  I suggested he restart Mucinex DM or similar and stay on it if it helps.  He does not want to go back to the lung doctor again.  Only other option I suggested was an allergist.  He will think about that.  2. Benign essential hypertension  Assessment & Plan:  Due for labs.  Continue atenolol  Orders:  -     CBC and differential; Future  -     Comprehensive metabolic panel; Future  -     TSH, 3rd generation with Free T4 reflex; Future  -     Lipid panel; Future  3. Pulmonary fibrosis determined by high resolution computed tomography (HCC)  Assessment & Plan:  Will need to encourage him to follow-up with pulmonary for this.  4. Mixed hyperlipidemia  Assessment & Plan:  Has been controlled but overdue for labs.  5. Visual hallucination   Unclear what this was.  But will start with labs.     History of Present Illness     Patient comes in today because he continues to have trouble with his cough.  He has been to pulmonary who found pulmonary fibrosis.  He has been to ENT.  He has seen both of them several times.  Multiple medications.  Multiple treatments.  Nothing working.  He states some things will work and then when he stops them the cough comes back.  Asking what else could be done.  He also had an episode where he was seeing things coming down off the ceiling fan 1 night.  He was not on any new medicines.  He had not had excessive alcohol.  He does not think he was falling asleep.  It felt very real to him.  He called his wife upstairs and there was nothing there.  She tried to get him to go to the ER but he declined.  He has had no further episodes since.  He does not think he was having a coughing fit before  "hand.  No other symptoms besides the hallucination.  And again, nothing since.        Review of Systems   Respiratory:  Negative for shortness of breath.    Cardiovascular:  Negative for chest pain.   Gastrointestinal:  Negative for abdominal pain.       Objective     /72 (BP Location: Left arm, Patient Position: Sitting, Cuff Size: Standard)   Pulse (!) 117   Temp (!) 97 °F (36.1 °C) (Tympanic)   Ht 5' 7\" (1.702 m)   Wt 70.8 kg (156 lb)   SpO2 96%   BMI 24.43 kg/m²     Physical Exam  Vitals and nursing note reviewed.   Constitutional:       Appearance: Normal appearance.   Pulmonary:      Comments: Episodic coughing  Neurological:      Mental Status: He is alert.       Administrative Statements     "

## 2024-06-12 NOTE — ASSESSMENT & PLAN NOTE
He has had an extensive workup.  I suggested he restart Mucinex DM or similar and stay on it if it helps.  He does not want to go back to the lung doctor again.  Only other option I suggested was an allergist.  He will think about that.

## 2024-06-12 NOTE — PATIENT INSTRUCTIONS
Would suggest trying Mucinex DM or Robitussin DM 12 hour tablets or similar.  If it helps, stay on it.  The goal is to block the symptoms.  We could also consider evaluation with an allergist.

## 2024-06-21 ENCOUNTER — APPOINTMENT (OUTPATIENT)
Dept: LAB | Facility: HOSPITAL | Age: 84
End: 2024-06-21
Payer: MEDICARE

## 2024-06-21 DIAGNOSIS — I10 BENIGN ESSENTIAL HYPERTENSION: ICD-10-CM

## 2024-06-21 LAB
ALBUMIN SERPL BCG-MCNC: 3.6 G/DL (ref 3.5–5)
ALP SERPL-CCNC: 99 U/L (ref 34–104)
ALT SERPL W P-5'-P-CCNC: 16 U/L (ref 7–52)
ANION GAP SERPL CALCULATED.3IONS-SCNC: 9 MMOL/L (ref 4–13)
AST SERPL W P-5'-P-CCNC: 20 U/L (ref 13–39)
BASOPHILS # BLD AUTO: 0.05 THOUSANDS/ÂΜL (ref 0–0.1)
BASOPHILS NFR BLD AUTO: 1 % (ref 0–1)
BILIRUB SERPL-MCNC: 0.69 MG/DL (ref 0.2–1)
BUN SERPL-MCNC: 17 MG/DL (ref 5–25)
CALCIUM SERPL-MCNC: 9.1 MG/DL (ref 8.4–10.2)
CHLORIDE SERPL-SCNC: 103 MMOL/L (ref 96–108)
CHOLEST SERPL-MCNC: 133 MG/DL
CO2 SERPL-SCNC: 27 MMOL/L (ref 21–32)
CREAT SERPL-MCNC: 1.26 MG/DL (ref 0.6–1.3)
EOSINOPHIL # BLD AUTO: 0.09 THOUSAND/ÂΜL (ref 0–0.61)
EOSINOPHIL NFR BLD AUTO: 1 % (ref 0–6)
ERYTHROCYTE [DISTWIDTH] IN BLOOD BY AUTOMATED COUNT: 14.3 % (ref 11.6–15.1)
GFR SERPL CREATININE-BSD FRML MDRD: 52 ML/MIN/1.73SQ M
GLUCOSE P FAST SERPL-MCNC: 95 MG/DL (ref 65–99)
HCT VFR BLD AUTO: 47 % (ref 36.5–49.3)
HDLC SERPL-MCNC: 52 MG/DL
HGB BLD-MCNC: 15 G/DL (ref 12–17)
IMM GRANULOCYTES # BLD AUTO: 0.03 THOUSAND/UL (ref 0–0.2)
IMM GRANULOCYTES NFR BLD AUTO: 0 % (ref 0–2)
LDLC SERPL CALC-MCNC: 63 MG/DL (ref 0–100)
LYMPHOCYTES # BLD AUTO: 1.6 THOUSANDS/ÂΜL (ref 0.6–4.47)
LYMPHOCYTES NFR BLD AUTO: 22 % (ref 14–44)
MCH RBC QN AUTO: 28.7 PG (ref 26.8–34.3)
MCHC RBC AUTO-ENTMCNC: 31.9 G/DL (ref 31.4–37.4)
MCV RBC AUTO: 90 FL (ref 82–98)
MONOCYTES # BLD AUTO: 0.75 THOUSAND/ÂΜL (ref 0.17–1.22)
MONOCYTES NFR BLD AUTO: 11 % (ref 4–12)
NEUTROPHILS # BLD AUTO: 4.64 THOUSANDS/ÂΜL (ref 1.85–7.62)
NEUTS SEG NFR BLD AUTO: 65 % (ref 43–75)
NONHDLC SERPL-MCNC: 81 MG/DL
NRBC BLD AUTO-RTO: 0 /100 WBCS
PLATELET # BLD AUTO: 262 THOUSANDS/UL (ref 149–390)
PMV BLD AUTO: 8.6 FL (ref 8.9–12.7)
POTASSIUM SERPL-SCNC: 4.6 MMOL/L (ref 3.5–5.3)
PROT SERPL-MCNC: 6.9 G/DL (ref 6.4–8.4)
RBC # BLD AUTO: 5.22 MILLION/UL (ref 3.88–5.62)
SODIUM SERPL-SCNC: 139 MMOL/L (ref 135–147)
TRIGL SERPL-MCNC: 90 MG/DL
TSH SERPL DL<=0.05 MIU/L-ACNC: 3.06 UIU/ML (ref 0.45–4.5)
WBC # BLD AUTO: 7.16 THOUSAND/UL (ref 4.31–10.16)

## 2024-06-21 PROCEDURE — 85025 COMPLETE CBC W/AUTO DIFF WBC: CPT

## 2024-06-21 PROCEDURE — 80053 COMPREHEN METABOLIC PANEL: CPT

## 2024-06-21 PROCEDURE — 80061 LIPID PANEL: CPT

## 2024-06-21 PROCEDURE — 36415 COLL VENOUS BLD VENIPUNCTURE: CPT

## 2024-06-21 PROCEDURE — 84443 ASSAY THYROID STIM HORMONE: CPT

## 2024-07-23 ENCOUNTER — OFFICE VISIT (OUTPATIENT)
Dept: INTERNAL MEDICINE CLINIC | Facility: CLINIC | Age: 84
End: 2024-07-23
Payer: MEDICARE

## 2024-07-23 VITALS
HEART RATE: 85 BPM | SYSTOLIC BLOOD PRESSURE: 118 MMHG | DIASTOLIC BLOOD PRESSURE: 74 MMHG | BODY MASS INDEX: 25.83 KG/M2 | WEIGHT: 164.6 LBS | HEIGHT: 67 IN | OXYGEN SATURATION: 100 % | TEMPERATURE: 97.5 F | RESPIRATION RATE: 18 BRPM

## 2024-07-23 DIAGNOSIS — Z00.00 MEDICARE ANNUAL WELLNESS VISIT, SUBSEQUENT: Primary | ICD-10-CM

## 2024-07-23 DIAGNOSIS — E78.2 MIXED HYPERLIPIDEMIA: ICD-10-CM

## 2024-07-23 DIAGNOSIS — R05.3 CHRONIC COUGH: ICD-10-CM

## 2024-07-23 PROCEDURE — 99214 OFFICE O/P EST MOD 30 MIN: CPT | Performed by: INTERNAL MEDICINE

## 2024-07-23 PROCEDURE — G0439 PPPS, SUBSEQ VISIT: HCPCS | Performed by: INTERNAL MEDICINE

## 2024-07-23 RX ORDER — GUAIFENESIN 200 MG/10ML
200 LIQUID ORAL 3 TIMES DAILY PRN
COMMUNITY
End: 2024-07-23

## 2024-07-23 RX ORDER — GUAIFENESIN 600 MG/1
600 TABLET, EXTENDED RELEASE ORAL EVERY 12 HOURS SCHEDULED
Qty: 60 TABLET | Refills: 3 | Status: SHIPPED | OUTPATIENT
Start: 2024-07-23

## 2024-07-23 RX ORDER — GUAIFENESIN 600 MG/1
1200 TABLET, EXTENDED RELEASE ORAL EVERY 12 HOURS SCHEDULED
COMMUNITY
End: 2024-07-23

## 2024-07-23 RX ORDER — GUAIFENESIN 200 MG/10ML
200 LIQUID ORAL
Qty: 120 ML | Refills: 3 | Status: SHIPPED | OUTPATIENT
Start: 2024-07-23

## 2024-07-23 NOTE — ASSESSMENT & PLAN NOTE
Doing very well with current meds.  Continue.  He can try and decrease the liquid Robitussin dose slightly.  But if this regimen works for him, just continue it.

## 2024-07-23 NOTE — PROGRESS NOTES
Ambulatory Visit  Name: Raj Krause      : 1940      MRN: [id number]  Encounter Provider: Jj Jerez MD  Encounter Date: 2024   Encounter department: [facility] INTERNAL MEDICINE [place]    Assessment & Plan   1. Medicare annual wellness visit, subsequent  2. Chronic cough  3. Mixed hyperlipidemia  -     CBC and differential; Future  -     Comprehensive metabolic panel; Future  -     Lipid panel; Future       Preventive health issues were discussed with patient, and age appropriate screening tests were ordered as noted in patient's After Visit Summary. Personalized health advice and appropriate referrals for health education or preventive services given if needed, as noted in patient's After Visit Summary.    History of Present Illness     Patient comes in today for follow-up and Medicare wellness.  His blood work was very good.  Cholesterol is controlled.  He is taking his medicines as directed.  After his last visit, he did take my advice and started taking the Mucinex routinely.  He takes the Mucinex extended release twice a day and 1 dose of Robitussin at bedtime.  That has worked well.  The cough is gone.  All of the chest stuff is gone.  He states he still has the postnasal drip and head congestion but overall way better than what he has been dealing with.  He is quite happy with the result.       Patient Care Team:  Jj Jerez MD as PCP - General    Review of Systems   Respiratory:  Negative for shortness of breath.    Cardiovascular:  Negative for chest pain.   Gastrointestinal:  Negative for abdominal pain.     Medical History Reviewed by provider this encounter:       Annual Wellness Visit Questionnaire   Last Medicare Wellness visit information reviewed, patient interviewed and updates made to the record today.      Health Risk Assessment:   Patient rates overall health as fair. Patient feels that their physical health rating is same. Patient is satisfied with their life.  Eyesight was rated as slightly worse. Hearing was rated as slightly worse. Patient feels that their emotional and mental health rating is same. Patients states they are sometimes angry. Patient states they are often unusually tired/fatigued. Pain experienced in the last 7 days has been none. Patient states that he has experienced no weight loss or gain in last 6 months.     Depression Screening:   PHQ-2 Score: 3  PHQ-9 Score: 13      Fall Risk Screening:   In the past year, patient has experienced: no history of falling in past year      Home Safety:  Patient has trouble with stairs inside or outside of their home. Patient has working smoke alarms and has no working carbon monoxide detector. Home safety hazards include: none.     Nutrition:   Current diet is Low Carb.     Medications:   Patient is not currently taking any over-the-counter supplements. Patient is able to manage medications.     Activities of Daily Living (ADLs)/Instrumental Activities of Daily Living (IADLs):   Walk and transfer into and out of bed and chair?: Yes  Dress and groom yourself?: Yes    Bathe or shower yourself?: Yes    Feed yourself? Yes  Do your laundry/housekeeping?: Yes  Manage your money, pay your bills and track your expenses?: Yes  Make your own meals?: Yes    Do your own shopping?: Yes    Previous Hospitalizations:   Any hospitalizations or ED visits within the last 12 months?: No      Advance Care Planning:   Living will: Yes    Durable POA for healthcare: Yes    Advanced directive: Yes    Advanced directive counseling given: Yes      Cognitive Screening:   Provider or family/friend/caregiver concerned regarding cognition?: No    PREVENTIVE SCREENINGS      Cardiovascular Screening:    General: Screening Not Indicated and History Lipid Disorder      Diabetes Screening:     General: Screening Current      Colorectal Cancer Screening:     General: Screening Not Indicated      Prostate Cancer Screening:    General: Screening Not  Indicated      Osteoporosis Screening:    General: Screening Not Indicated      Abdominal Aortic Aneurysm (AAA) Screening:    Risk factors include: tobacco use        General: Screening Not Indicated      Lung Cancer Screening:     General: Screening Not Indicated      Hepatitis C Screening:    General: Screening Not Indicated    Screening, Brief Intervention, and Referral to Treatment (SBIRT)    Screening  Typical number of drinks in a day: 1  Typical number of drinks in a week: 5  Interpretation: Low risk drinking behavior.    AUDIT-C Screenin) How often did you have a drink containing alcohol in the past year? never  2) How many drinks did you have on a typical day when you were drinking in the past year? 0  3) How often did you have 6 or more drinks on one occasion in the past year? never    AUDIT-C Score: 0  Interpretation: Score 0-3 (male): Negative screen for alcohol misuse    Single Item Drug Screening:  How often have you used an illegal drug (including marijuana) or a prescription medication for non-medical reasons in the past year? never    Single Item Drug Screen Score: 0  Interpretation: Negative screen for possible drug use disorder    Brief Intervention  Alcohol & drug use screenings were reviewed. No concerns regarding substance use disorder identified.     Social Determinants of Health     Financial Resource Strain: Low Risk  (2023)    Overall Financial Resource Strain (CARDIA)    • Difficulty of Paying Living Expenses: Not hard at all   Food Insecurity: No Food Insecurity (2024)    Hunger Vital Sign    • Worried About Running Out of Food in the Last Year: Never true    • Ran Out of Food in the Last Year: Never true   Transportation Needs: No Transportation Needs (2024)    PRAPARE - Transportation    • Lack of Transportation (Medical): No    • Lack of Transportation (Non-Medical): No   Housing Stability: Low Risk  (2024)    Housing Stability Vital Sign    • Unable to Pay for  "Housing in the Last Year: No    • Number of Times Moved in the Last Year: 1    • Homeless in the Last Year: No   Utilities: Not At Risk (7/23/2024)    [facility] Utilities    • Threatened with loss of utilities: No     No results found.    Objective     /74 (BP Location: Left arm, Patient Position: Sitting, Cuff Size: Standard)   Pulse 85   Temp 97.5 °F (36.4 °C) (Tympanic)   Resp 18   Ht 5' 7\" (1.702 m)   Wt 74.7 kg (164 lb 9.6 oz)   SpO2 100%   BMI 25.78 kg/m²     Physical Exam  Vitals and nursing note reviewed.   Cardiovascular:      Rate and Rhythm: Normal rate and regular rhythm.   Pulmonary:      Effort: Pulmonary effort is normal.      Breath sounds: Normal breath sounds.   Abdominal:      General: Abdomen is flat.      Tenderness: There is no abdominal tenderness.   Musculoskeletal:      Right lower leg: No edema.      Left lower leg: No edema.   Neurological:      Mental Status: He is alert and oriented to person, place, and time.   Psychiatric:         Behavior: Behavior normal.         Thought Content: Thought content normal.         Judgment: Judgment normal.           "

## 2024-07-23 NOTE — PATIENT INSTRUCTIONS
Medicare Preventive Visit Patient Instructions  Thank you for completing your Welcome to Medicare Visit or Medicare Annual Wellness Visit today. Your next wellness visit will be due in one year (7/24/2025).  The screening/preventive services that you may require over the next 5-10 years are detailed below. Some tests may not apply to you based off risk factors and/or age. Screening tests ordered at today's visit but not completed yet may show as past due. Also, please note that scanned in results may not display below.  Preventive Screenings:  Service Recommendations Previous Testing/Comments   Colorectal Cancer Screening  Colonoscopy    Fecal Occult Blood Test (FOBT)/Fecal Immunochemical Test (FIT)  Fecal DNA/Cologuard Test  Flexible Sigmoidoscopy Age: 45-75 years old   Colonoscopy: every 10 years (May be performed more frequently if at higher risk)  OR  FOBT/FIT: every 1 year  OR  Cologuard: every 3 years  OR  Sigmoidoscopy: every 5 years  Screening may be recommended earlier than age 45 if at higher risk for colorectal cancer. Also, an individualized decision between you and your healthcare provider will decide whether screening between the ages of 76-85 would be appropriate. Colonoscopy: Not on file  FOBT/FIT: Not on file  Cologuard: Not on file  Sigmoidoscopy: Not on file          Prostate Cancer Screening Individualized decision between patient and health care provider in men between ages of 55-69   Medicare will cover every 12 months beginning on the day after your 50th birthday PSA: No results in last 5 years     Screening Not Indicated     Hepatitis C Screening Once for adults born between 1945 and 1965  More frequently in patients at high risk for Hepatitis C Hep C Antibody: Not on file        Diabetes Screening 1-2 times per year if you're at risk for diabetes or have pre-diabetes Fasting glucose: 95 mg/dL (6/21/2024)  A1C: No results in last 5 years (No results in last 5 years)  Screening Current    Cholesterol Screening Once every 5 years if you don't have a lipid disorder. May order more often based on risk factors. Lipid panel: 06/21/2024  Screening Not Indicated  History Lipid Disorder      Other Preventive Screenings Covered by Medicare:  Abdominal Aortic Aneurysm (AAA) Screening: covered once if your at risk. You're considered to be at risk if you have a family history of AAA or a male between the age of 65-75 who smoking at least 100 cigarettes in your lifetime.  Lung Cancer Screening: covers low dose CT scan once per year if you meet all of the following conditions: (1) Age 55-77; (2) No signs or symptoms of lung cancer; (3) Current smoker or have quit smoking within the last 15 years; (4) You have a tobacco smoking history of at least 20 pack years (packs per day x number of years you smoked); (5) You get a written order from a healthcare provider.  Glaucoma Screening: covered annually if you're considered high risk: (1) You have diabetes OR (2) Family history of glaucoma OR (3)  aged 50 and older OR (4)  American aged 65 and older  Osteoporosis Screening: covered every 2 years if you meet one of the following conditions: (1) Have a vertebral abnormality; (2) On glucocorticoid therapy for more than 3 months; (3) Have primary hyperparathyroidism; (4) On osteoporosis medications and need to assess response to drug therapy.  HIV Screening: covered annually if you're between the age of 15-65. Also covered annually if you are younger than 15 and older than 65 with risk factors for HIV infection. For pregnant patients, it is covered up to 3 times per pregnancy.    Immunizations:  Immunization Recommendations   Influenza Vaccine Annual influenza vaccination during flu season is recommended for all persons aged >= 6 months who do not have contraindications   Pneumococcal Vaccine   * Pneumococcal conjugate vaccine = PCV13 (Prevnar 13), PCV15 (Vaxneuvance), PCV20 (Prevnar 20)  *  Pneumococcal polysaccharide vaccine = PPSV23 (Pneumovax) Adults 19-63 yo with certain risk factors or if 65+ yo  If never received any pneumonia vaccine: recommend Prevnar 20 (PCV20)  Give PCV20 if previously received 1 dose of PCV13 or PPSV23   Hepatitis B Vaccine 3 dose series if at intermediate or high risk (ex: diabetes, end stage renal disease, liver disease)   Respiratory syncytial virus (RSV) Vaccine - COVERED BY MEDICARE PART D  * RSVPreF3 (Arexvy) CDC recommends that adults 60 years of age and older may receive a single dose of RSV vaccine using shared clinical decision-making (SCDM)   Tetanus (Td) Vaccine - COST NOT COVERED BY MEDICARE PART B Following completion of primary series, a booster dose should be given every 10 years to maintain immunity against tetanus. Td may also be given as tetanus wound prophylaxis.   Tdap Vaccine - COST NOT COVERED BY MEDICARE PART B Recommended at least once for all adults. For pregnant patients, recommended with each pregnancy.   Shingles Vaccine (Shingrix) - COST NOT COVERED BY MEDICARE PART B  2 shot series recommended in those 19 years and older who have or will have weakened immune systems or those 50 years and older     Health Maintenance Due:  There are no preventive care reminders to display for this patient.  Immunizations Due:      Topic Date Due   • Pneumococcal Vaccine: 65+ Years (1 of 1 - PCV) Never done   • COVID-19 Vaccine (3 - 2023-24 season) 09/01/2023   • Influenza Vaccine (1) 09/01/2024     Advance Directives   What are advance directives?  Advance directives are legal documents that state your wishes and plans for medical care. These plans are made ahead of time in case you lose your ability to make decisions for yourself. Advance directives can apply to any medical decision, such as the treatments you want, and if you want to donate organs.   What are the types of advance directives?  There are many types of advance directives, and each state has rules  about how to use them. You may choose a combination of any of the following:  Living will:  This is a written record of the treatment you want. You can also choose which treatments you do not want, which to limit, and which to stop at a certain time. This includes surgery, medicine, IV fluid, and tube feedings.   Durable power of  for healthcare (DPAHC):  This is a written record that states who you want to make healthcare choices for you when you are unable to make them for yourself. This person, called a proxy, is usually a family member or a friend. You may choose more than 1 proxy.  Do not resuscitate (DNR) order:  A DNR order is used in case your heart stops beating or you stop breathing. It is a request not to have certain forms of treatment, such as CPR. A DNR order may be included in other types of advance directives.  Medical directive:  This covers the care that you want if you are in a coma, near death, or unable to make decisions for yourself. You can list the treatments you want for each condition. Treatment may include pain medicine, surgery, blood transfusions, dialysis, IV or tube feedings, and a ventilator (breathing machine).  Values history:  This document has questions about your views, beliefs, and how you feel and think about life. This information can help others choose the care that you would choose.  Why are advance directives important?  An advance directive helps you control your care. Although spoken wishes may be used, it is better to have your wishes written down. Spoken wishes can be misunderstood, or not followed. Treatments may be given even if you do not want them. An advance directive may make it easier for your family to make difficult choices about your care.   Depression   Depression  is a medical condition that causes feelings of sadness or hopelessness that do not go away. Depression may cause you to lose interest in things you used to enjoy. These feelings may  interfere with your daily life.  Call your local emergency number (911 in the [place]) if:   You think about harming yourself or someone else.  You have done something on purpose to hurt yourself.  The following resources are available at any time to help you, if needed:   National Suicide Prevention Lifeline: 1-481.891.5870 (7-142-142-TALK)   Suicide Hotline: 1-585.499.5311 (7-754-ZSJSLDF)   For a list of international numbers: https://save.org/find-help/international-resources/  Treatment for depression may include medicine to relieve depression. Medicine is often used together with therapy. Therapy is a way for you to talk about your feelings and anything that may be causing depression. Therapy can be done alone or in a group. It may also be done with family members or a significant other.  Get regular physical activity.    Create a regular sleep schedule.    Eat a variety of healthy foods.    Do not drink alcohol or use drugs.     Weight Management   Why it is important to manage your weight:  Being overweight increases your risk of health conditions such as heart disease, high blood pressure, type 2 diabetes, and certain types of cancer. It can also increase your risk for osteoarthritis, sleep apnea, and other respiratory problems. Aim for a slow, steady weight loss. Even a small amount of weight loss can lower your risk of health problems.  How to lose weight safely:  A safe and healthy way to lose weight is to eat fewer calories and get regular exercise. You can lose up about 1 pound a week by decreasing the number of calories you eat by 500 calories each day.   Healthy meal plan for weight management:  A healthy meal plan includes a variety of foods, contains fewer calories, and helps you stay healthy. A healthy meal plan includes the following:  Eat whole-grain foods more often.  A healthy meal plan should contain fiber. Fiber is the part of grains, fruits, and vegetables that is not broken down by your body.  Whole-grain foods are healthy and provide extra fiber in your diet. Some examples of whole-grain foods are whole-wheat breads and pastas, oatmeal, brown rice, and bulgur.  Eat a variety of vegetables every day.  Include dark, leafy greens such as spinach, kale, yeny greens, and mustard greens. Eat yellow and orange vegetables such as carrots, sweet potatoes, and winter squash.   Eat a variety of fruits every day.  Choose fresh or canned fruit (canned in its own juice or light syrup) instead of juice. Fruit juice has very little or no fiber.  Eat low-fat dairy foods.  Drink fat-free (skim) milk or 1% milk. Eat fat-free yogurt and low-fat cottage cheese. Try low-fat cheeses such as mozzarella and other reduced-fat cheeses.  Choose meat and other protein foods that are low in fat.  Choose beans or other legumes such as split peas or lentils. Choose fish, skinless poultry (chicken or turkey), or lean cuts of red meat (beef or pork). Before you cook meat or poultry, cut off any visible fat.   Use less fat and oil.  Try baking foods instead of frying them. Add less fat, such as margarine, sour cream, regular salad dressing and mayonnaise to foods. Eat fewer high-fat foods. Some examples of high-fat foods include french fries, doughnuts, ice cream, and cakes.  Eat fewer sweets.  Limit foods and drinks that are high in sugar. This includes candy, cookies, regular soda, and sweetened drinks.  Exercise:  Exercise at least 30 minutes per day on most days of the week. Some examples of exercise include walking, biking, dancing, and swimming. You can also fit in more physical activity by taking the stairs instead of the elevator or parking farther away from stores. Ask your healthcare provider about the best exercise plan for you.      © Copyright [employer] 2018 Information is for End User's use only and may not be sold, redistributed or otherwise used for commercial purposes. All illustrations and images included in  CareNotes® are the copyrighted property of [employer] or [employer]

## 2024-12-06 ENCOUNTER — TELEPHONE (OUTPATIENT)
Dept: ADMINISTRATIVE | Facility: OTHER | Age: 84
End: 2024-12-06

## 2024-12-06 NOTE — TELEPHONE ENCOUNTER
12/06/24 10:50 AM    Patient contacted to bring Advance Directive, POLST, or Living Will document to next scheduled pcp visit.[facility] Department left message.    Thank you.  Esdras Pearce MA  [facility]

## 2024-12-11 ENCOUNTER — OFFICE VISIT (OUTPATIENT)
Dept: INTERNAL MEDICINE CLINIC | Facility: CLINIC | Age: 84
End: 2024-12-11
Payer: MEDICARE

## 2024-12-11 VITALS
SYSTOLIC BLOOD PRESSURE: 118 MMHG | OXYGEN SATURATION: 95 % | TEMPERATURE: 97.9 F | WEIGHT: 163.6 LBS | BODY MASS INDEX: 25.68 KG/M2 | HEART RATE: 125 BPM | HEIGHT: 67 IN | RESPIRATION RATE: 18 BRPM | DIASTOLIC BLOOD PRESSURE: 70 MMHG

## 2024-12-11 DIAGNOSIS — B35.1 ONYCHOMYCOSIS: Primary | ICD-10-CM

## 2024-12-11 DIAGNOSIS — I48.91 ATRIAL FIBRILLATION, UNSPECIFIED TYPE (HCC): ICD-10-CM

## 2024-12-11 DIAGNOSIS — H61.23 BILATERAL IMPACTED CERUMEN: ICD-10-CM

## 2024-12-11 PROCEDURE — 99214 OFFICE O/P EST MOD 30 MIN: CPT | Performed by: INTERNAL MEDICINE

## 2024-12-11 PROCEDURE — G2211 COMPLEX E/M VISIT ADD ON: HCPCS | Performed by: INTERNAL MEDICINE

## 2024-12-11 RX ORDER — CICLOPIROX 80 MG/ML
SOLUTION TOPICAL
Qty: 18 ML | Refills: 3 | Status: SHIPPED | OUTPATIENT
Start: 2024-12-11

## 2024-12-11 NOTE — PROGRESS NOTES
"Name: Raj Krause      : 1940      MRN: [id number]  Encounter Provider: Jj Jerez MD  Encounter Date: 2024   Encounter department: [facility] [place]  :  Assessment & Plan  Onychomycosis  Will try the Penlac.  His wife is also going to try and get him an appoint with podiatry.  Orders:  •  ciclopirox (PENLAC) 8 % solution; Apply topically daily at bedtime  •  Ambulatory Referral to Dermatology; Future    Bilateral impacted cerumen  Start with Debrox and then reassess.  Also reassess if he still hears the opera music in his ear at follow-up.  With the reported weight loss, may need to consider dementia workup, especially Lewy body.       Atrial fibrillation, unspecified type (HCC)  Continue follow-up with cardiology.              History of Present Illness     Patient comes in today with a few concerns.  States his hearing is decreased.  But also for almost a year now, he has been having trouble with his nails on his hands and feet.  Nails become deformed, discolored, and several then fall off, regrowing to repeat the process.  He has never mentioned this before.  His wife also told the staff that he is hearing things and becoming forgetful.  He states that he hears male 's in his right ear when he listens very closely.  He does not see anybody.  No hallucinations.      Review of Systems   Respiratory:  Negative for shortness of breath.    Cardiovascular:  Negative for chest pain.   Gastrointestinal:  Negative for abdominal pain.       Objective   /70 (BP Location: Left arm, Patient Position: Sitting, Cuff Size: Standard)   Pulse (!) 125   Temp 97.9 °F (36.6 °C) (Tympanic)   Resp 18   Ht 5' 7\" (1.702 m)   Wt 74.2 kg (163 lb 9.6 oz)   SpO2 95%   BMI 25.62 kg/m²      Physical Exam  Vitals and nursing note reviewed.   Constitutional:       Appearance: Normal appearance.   HENT:      Right Ear: There is impacted cerumen.      Left Ear: There is " impacted cerumen.   Neurological:      Mental Status: He is alert.

## 2024-12-17 ENCOUNTER — TELEPHONE (OUTPATIENT)
Age: 84
End: 2024-12-17

## 2024-12-17 NOTE — TELEPHONE ENCOUNTER
Pt wife called stating that patient has been using the ciclopirox ear drops to loosen his wax since appointment on 12/11. She states that patient is complaining of being completely deaf after receiving the drops. Wife would like him to be evaluated sooner then 12/27 due to this issue. Appointment booked with Dr Paul tomorrow at 220pm.

## 2024-12-18 ENCOUNTER — OFFICE VISIT (OUTPATIENT)
Dept: INTERNAL MEDICINE CLINIC | Facility: CLINIC | Age: 84
End: 2024-12-18
Payer: MEDICARE

## 2024-12-18 VITALS
BODY MASS INDEX: 25.62 KG/M2 | SYSTOLIC BLOOD PRESSURE: 124 MMHG | DIASTOLIC BLOOD PRESSURE: 74 MMHG | HEIGHT: 67 IN | HEART RATE: 101 BPM | OXYGEN SATURATION: 97 %

## 2024-12-18 DIAGNOSIS — H61.23 BILATERAL HEARING LOSS DUE TO CERUMEN IMPACTION: Primary | ICD-10-CM

## 2024-12-18 PROCEDURE — 69210 REMOVE IMPACTED EAR WAX UNI: CPT | Performed by: PHYSICIAN ASSISTANT

## 2024-12-18 NOTE — PROGRESS NOTES
Ear cerumen removal    Date/Time: 12/18/2024 2:20 PM    Performed by: Andrew Paul PA-C  Authorized by: Andrew Paul PA-C  Universal Protocol:  procedure performed by consultantConsent: Verbal consent obtained.  Risks and benefits: risks, benefits and alternatives were discussed  Consent given by: patient  Patient understanding: patient states understanding of the procedure being performed  Patient identity confirmed: verbally with patient    Patient location:  Clinic  Procedure details:     Local anesthetic:  None    Location:  Both ears    Procedure type: irrigation with instrumentation      Instrumentation: forceps and loop      Approach:  External and natural orifice    Visualization (free text):  Otoscope    Equipment used:  Loop  Post-procedure details:     Complication:  None    Hearing quality:  Improved    Patient tolerance of procedure:  Tolerated well, no immediate complications  Comments:      Postprocedure both tympanic membranes were well-visualized and appeared within normal limits.

## 2025-01-22 DIAGNOSIS — E78.2 MIXED HYPERLIPIDEMIA: ICD-10-CM

## 2025-01-22 RX ORDER — ATORVASTATIN CALCIUM 40 MG/1
40 TABLET, FILM COATED ORAL
Qty: 90 TABLET | Refills: 1 | Status: SHIPPED | OUTPATIENT
Start: 2025-01-22

## 2025-01-24 ENCOUNTER — TELEPHONE (OUTPATIENT)
Dept: ADMINISTRATIVE | Facility: OTHER | Age: 85
End: 2025-01-24

## 2025-01-24 ENCOUNTER — TELEPHONE (OUTPATIENT)
Age: 85
End: 2025-01-24

## 2025-01-24 NOTE — TELEPHONE ENCOUNTER
01/24/25 10:39 AM    Patient contacted to bring Advance Directive, POLST, or Living Will document to next scheduled pcp visit.[facility] Department left message.    Thank you.  Esdras Pearce MA  [facility]

## 2025-01-24 NOTE — TELEPHONE ENCOUNTER
Pt wife called asking about patients medications for his nail fungus, ciclopirox (PENLAC) 8 % solution   . She states she is wondering how long Dr Jerez would want him to be taking it. She states they went through the first bottle quickly because he has spilled some out. She is going to call [facility] to refill but was unsure how long he is to be using it. Please let Lacy know at [phone]

## 2025-01-28 ENCOUNTER — APPOINTMENT (OUTPATIENT)
Dept: LAB | Facility: HOSPITAL | Age: 85
End: 2025-01-28
Attending: INTERNAL MEDICINE
Payer: MEDICARE

## 2025-01-28 ENCOUNTER — OFFICE VISIT (OUTPATIENT)
Dept: INTERNAL MEDICINE CLINIC | Facility: CLINIC | Age: 85
End: 2025-01-28
Payer: MEDICARE

## 2025-01-28 VITALS
BODY MASS INDEX: 25.05 KG/M2 | WEIGHT: 159.6 LBS | OXYGEN SATURATION: 98 % | HEART RATE: 110 BPM | HEIGHT: 67 IN | RESPIRATION RATE: 18 BRPM | DIASTOLIC BLOOD PRESSURE: 80 MMHG | SYSTOLIC BLOOD PRESSURE: 118 MMHG

## 2025-01-28 DIAGNOSIS — H25.13 AGE-RELATED NUCLEAR CATARACT OF BOTH EYES: ICD-10-CM

## 2025-01-28 DIAGNOSIS — Z01.818 PRE-OP EXAMINATION: Primary | ICD-10-CM

## 2025-01-28 DIAGNOSIS — E78.2 MIXED HYPERLIPIDEMIA: ICD-10-CM

## 2025-01-28 LAB
ALBUMIN SERPL BCG-MCNC: 3.8 G/DL (ref 3.5–5)
ALP SERPL-CCNC: 102 U/L (ref 34–104)
ALT SERPL W P-5'-P-CCNC: 14 U/L (ref 7–52)
ANION GAP SERPL CALCULATED.3IONS-SCNC: 8 MMOL/L (ref 4–13)
AST SERPL W P-5'-P-CCNC: 18 U/L (ref 13–39)
BASOPHILS # BLD AUTO: 0.06 THOUSANDS/ΜL (ref 0–0.1)
BASOPHILS NFR BLD AUTO: 1 % (ref 0–1)
BILIRUB SERPL-MCNC: 1.05 MG/DL (ref 0.2–1)
BUN SERPL-MCNC: 21 MG/DL (ref 5–25)
CALCIUM SERPL-MCNC: 9.5 MG/DL (ref 8.4–10.2)
CHLORIDE SERPL-SCNC: 105 MMOL/L (ref 96–108)
CHOLEST SERPL-MCNC: 170 MG/DL (ref ?–200)
CO2 SERPL-SCNC: 26 MMOL/L (ref 21–32)
CREAT SERPL-MCNC: 1.25 MG/DL (ref 0.6–1.3)
EOSINOPHIL # BLD AUTO: 0.14 THOUSAND/ΜL (ref 0–0.61)
EOSINOPHIL NFR BLD AUTO: 2 % (ref 0–6)
ERYTHROCYTE [DISTWIDTH] IN BLOOD BY AUTOMATED COUNT: 13.6 % (ref 11.6–15.1)
GFR SERPL CREATININE-BSD FRML MDRD: 52 ML/MIN/1.73SQ M
GLUCOSE P FAST SERPL-MCNC: 112 MG/DL (ref 65–99)
HCT VFR BLD AUTO: 46.4 % (ref 36.5–49.3)
HDLC SERPL-MCNC: 57 MG/DL
HGB BLD-MCNC: 14.9 G/DL (ref 12–17)
IMM GRANULOCYTES # BLD AUTO: 0.02 THOUSAND/UL (ref 0–0.2)
IMM GRANULOCYTES NFR BLD AUTO: 0 % (ref 0–2)
LDLC SERPL CALC-MCNC: 91 MG/DL (ref 0–100)
LYMPHOCYTES # BLD AUTO: 1.88 THOUSANDS/ΜL (ref 0.6–4.47)
LYMPHOCYTES NFR BLD AUTO: 27 % (ref 14–44)
MCH RBC QN AUTO: 29.6 PG (ref 26.8–34.3)
MCHC RBC AUTO-ENTMCNC: 32.1 G/DL (ref 31.4–37.4)
MCV RBC AUTO: 92 FL (ref 82–98)
MONOCYTES # BLD AUTO: 0.73 THOUSAND/ΜL (ref 0.17–1.22)
MONOCYTES NFR BLD AUTO: 11 % (ref 4–12)
NEUTROPHILS # BLD AUTO: 4.02 THOUSANDS/ΜL (ref 1.85–7.62)
NEUTS SEG NFR BLD AUTO: 59 % (ref 43–75)
NONHDLC SERPL-MCNC: 113 MG/DL
NRBC BLD AUTO-RTO: 0 /100 WBCS
PLATELET # BLD AUTO: 203 THOUSANDS/UL (ref 149–390)
PMV BLD AUTO: 8.8 FL (ref 8.9–12.7)
POTASSIUM SERPL-SCNC: 4.3 MMOL/L (ref 3.5–5.3)
PROT SERPL-MCNC: 6.8 G/DL (ref 6.4–8.4)
RBC # BLD AUTO: 5.03 MILLION/UL (ref 3.88–5.62)
SODIUM SERPL-SCNC: 139 MMOL/L (ref 135–147)
TRIGL SERPL-MCNC: 110 MG/DL (ref ?–150)
WBC # BLD AUTO: 6.85 THOUSAND/UL (ref 4.31–10.16)

## 2025-01-28 PROCEDURE — 80061 LIPID PANEL: CPT

## 2025-01-28 PROCEDURE — 99214 OFFICE O/P EST MOD 30 MIN: CPT | Performed by: INTERNAL MEDICINE

## 2025-01-28 PROCEDURE — G2211 COMPLEX E/M VISIT ADD ON: HCPCS | Performed by: INTERNAL MEDICINE

## 2025-01-28 PROCEDURE — 80053 COMPREHEN METABOLIC PANEL: CPT

## 2025-01-28 PROCEDURE — 85025 COMPLETE CBC W/AUTO DIFF WBC: CPT

## 2025-01-28 PROCEDURE — 36415 COLL VENOUS BLD VENIPUNCTURE: CPT

## 2025-01-28 NOTE — PROGRESS NOTES
Pre-operative Clearance  Name: Raj Krause      : 1940      MRN: [id number]  Encounter Provider: Jj Jerez MD  Encounter Date: 2025   Encounter department: [facility] [place]    Assessment & Plan  Pre-op examination         Age-related nuclear cataract of both eyes         Pre-operative Clearance:     Revised Cardiac Risk Index:  RCI RISK CLASS I (0 risk factors, risk of major cardiac complications approximately 0.5%)    Clearance:  Patient is medically optimized (CLEARED) for proposed surgery without any additional cardiac testing.       Labs are within acceptable limits.    Medication Instructions:   - Avoid herbs or non-directed vitamins one week prior to surgery    - May take tylenol for pain up until the night before surgery    - Antidepressants: Continue to take this medication on your normal schedule.  - Beta blockers:  Continue to take this medication on your normal schedule.  - Calcium channel blockers: Continue to take this medication on your normal schedule.  - H2 Blocker: Continue to take this medication on your normal schedule.  - Hyperlipidemia meds: Continue to take this medication on your normal schedule.       History of Present Illness     Pre-op Exam  Surgery: Cataract extraction  Anticipated Date of Surgery: ,   Surgeon: Dr Enciso    Patient comes in today for preop evaluation for bilateral cataract surgery secondary to worsening vision.    Previous history of bleeding disorders or clots?: No  Previous Anesthesia reaction?: No  Prolonged steroid use in the last 6 months?: No    Assessment of Cardiac Risk:   - Unstable or severe angina or MI in the last 6 weeks or history of stent placement in the last year?: No   - Decompensated heart failure (e.g. New onset heart failure, NYHA  Class IV heart failure, or worsening existing heart failure)?: No  - Significant arrhythmias such as high grade AV block, symptomatic ventricular arrhythmia, newly  recognized ventricular tachycardia, supraventricular tachycardia with resting heart rate >100, or symptomatic bradycardia?: No  - Severe heart valve disease including aortic stenosis or symptomatic mitral stenosis?: No      Pre-operative Risk Factors:  Elevated-risk surgery: No    History of cerebrovascular disease: No    History of ischemic heart disease: No  Pre-operative treatment with insulin: No  Pre-operative creatinine >2 mg/dL: No    History of congestive heart failure: No    Medications of Perioperative Concern:   Anti-platelet (aspirin, clopidogrel, ticagrelor, prasugrel, cilostazol, dipyridamole), Calcium channel blockers and Beta blockers    Review of Systems   Respiratory:  Negative for shortness of breath.    Cardiovascular:  Negative for chest pain.   Gastrointestinal:  Negative for abdominal pain.     Past Medical History   Past Medical History:   Diagnosis Date   • Nosebleed    • Retinal hemorrhage, left     last assessed: 2015     Past Surgical History:   Procedure Laterality Date   • LITHOTRIPSY     • TONSILLECTOMY       History reviewed. No pertinent family history.  Social History     Tobacco Use   • Smoking status: Former     Current packs/day: 0.00     Average packs/day: 2.0 packs/day for 14.8 years (29.7 ttl pk-yrs)     Types: Cigarettes     Start date:      Quit date: 1984     Years since quittin.2   • Smokeless tobacco: Never   Vaping Use   • Vaping status: Never Used   Substance and Sexual Activity   • Alcohol use: Never   • Drug use: No   • Sexual activity: Not Currently     Comment: denied: histroy of high risk sexual behavior     Current Outpatient Medications on File Prior to Visit   Medication Sig   • amLODIPine (NORVASC) 2.5 mg tablet TAKE 1 TABLET BY MOUTH DAILY   • aspirin (ECOTRIN LOW STRENGTH) 81 mg EC tablet    • atenolol (TENORMIN) 50 mg tablet Take 1.5 tablets (75 mg total) by mouth 2 (two) times a day   • atorvastatin (LIPITOR) 40 mg tablet TAKE 1 TABLET BY  "MOUTH DAILY AT  BEDTIME   • ciclopirox (PENLAC) 8 % solution Apply topically daily at bedtime   • famotidine (PEPCID) 20 mg tablet Take 20 mg by mouth daily   • FLUoxetine (PROzac) 20 mg capsule TAKE 1 CAPSULE BY MOUTH DAILY   • guaiFENesin (MUCINEX) 600 mg 12 hr tablet Take 1 tablet (600 mg total) by mouth every 12 (twelve) hours (Patient not taking: Reported on 12/11/2024)   • guaiFENesin (ROBITUSSIN) 100 MG/5ML oral liquid Take 10 mL (200 mg total) by mouth daily at bedtime (Patient not taking: Reported on 12/11/2024)   • [DISCONTINUED] fexofenadine-pseudoephedrine (ALLEGRA-D 24) 180-240 MG per 24 hr tablet Take 1 tablet by mouth daily (Patient not taking: Reported on 12/11/2024)     No Known Allergies  Objective   /80 (BP Location: Left arm, Patient Position: Sitting, Cuff Size: Adult)   Pulse (!) 110   Resp 18   Ht 5' 7\" (1.702 m)   Wt 72.4 kg (159 lb 9.6 oz)   SpO2 98%   BMI 25.00 kg/m²     Physical Exam  Vitals and nursing note reviewed.   Constitutional:       Appearance: Normal appearance. He is well-developed.   HENT:      Right Ear: External ear normal.      Left Ear: External ear normal.      Mouth/Throat:      Pharynx: No oropharyngeal exudate.   Eyes:      Conjunctiva/sclera: Conjunctivae normal.      Pupils: Pupils are equal, round, and reactive to light.   Neck:      Vascular: No carotid bruit.   Cardiovascular:      Rate and Rhythm: Normal rate and regular rhythm.      Heart sounds: Normal heart sounds.   Pulmonary:      Effort: Pulmonary effort is normal.      Breath sounds: Normal breath sounds. No wheezing or rales.   Abdominal:      Palpations: Abdomen is soft.      Tenderness: There is no abdominal tenderness.   Musculoskeletal:      Right lower leg: No edema.      Left lower leg: No edema.   Lymphadenopathy:      Cervical: No cervical adenopathy.   Skin:     Findings: No rash.   Neurological:      Mental Status: He is alert and oriented to person, place, and time.           Jj " MD [facility]

## 2025-01-30 DIAGNOSIS — I10 ESSENTIAL HYPERTENSION: ICD-10-CM

## 2025-01-30 RX ORDER — AMLODIPINE BESYLATE 2.5 MG/1
2.5 TABLET ORAL DAILY
Qty: 90 TABLET | Refills: 1 | Status: SHIPPED | OUTPATIENT
Start: 2025-01-30

## 2025-02-03 DIAGNOSIS — F41.9 ANXIETY: ICD-10-CM

## 2025-02-05 DIAGNOSIS — F41.9 ANXIETY: ICD-10-CM

## 2025-02-05 NOTE — TELEPHONE ENCOUNTER
- This is NOT a duplicate. Patient needs this medication to be sent to his mail pharmacy    Reason for call:   [x] Refill   [] Prior Auth  [] Other:     Office:   [x] PCP/Provider -   [] Specialty/Provider -     Medication:   - Fluoxetine 20mg- take 1 capsule by mouth daily      Pharmacy: [facility]    Does the patient have enough for 3 days?   [] Yes   [x] No - Send as HP to POD

## 2025-03-10 ENCOUNTER — TELEPHONE (OUTPATIENT)
Dept: ADMINISTRATIVE | Facility: OTHER | Age: 85
End: 2025-03-10

## 2025-03-10 NOTE — TELEPHONE ENCOUNTER
03/10/25 1:44 PM    Patient contacted to bring Advance Directive, POLST, or Living Will document to next scheduled pcp visit.[facility] Department left message.    Thank you.  Olga Cline MA  [facility]

## 2025-03-11 ENCOUNTER — CONSULT (OUTPATIENT)
Dept: INTERNAL MEDICINE CLINIC | Facility: CLINIC | Age: 85
End: 2025-03-11
Payer: MEDICARE

## 2025-03-11 VITALS
OXYGEN SATURATION: 96 % | HEART RATE: 103 BPM | WEIGHT: 160 LBS | SYSTOLIC BLOOD PRESSURE: 136 MMHG | RESPIRATION RATE: 17 BRPM | HEIGHT: 67 IN | BODY MASS INDEX: 25.11 KG/M2 | DIASTOLIC BLOOD PRESSURE: 78 MMHG

## 2025-03-11 DIAGNOSIS — H25.13 AGE-RELATED NUCLEAR CATARACT OF BOTH EYES: ICD-10-CM

## 2025-03-11 DIAGNOSIS — Z01.818 PRE-OP EXAMINATION: Primary | ICD-10-CM

## 2025-03-11 PROCEDURE — G2211 COMPLEX E/M VISIT ADD ON: HCPCS | Performed by: INTERNAL MEDICINE

## 2025-03-11 PROCEDURE — 99214 OFFICE O/P EST MOD 30 MIN: CPT | Performed by: INTERNAL MEDICINE

## 2025-03-11 NOTE — PROGRESS NOTES
Pre-operative Clearance  Name: Raj Krause      : 1940      MRN: [id number]  Encounter Provider: Jj Jerez MD  Encounter Date: 3/11/2025   Encounter department: [facility] [place]    Assessment & Plan  Pre-op examination         Age-related nuclear cataract of both eyes         Pre-operative Clearance:     Revised Cardiac Risk Index:  RCI RISK CLASS I (0 risk factors, risk of major cardiac complications approximately 0.5%)    Clearance:  Patient is medically optimized (CLEARED) for proposed surgery without any additional cardiac testing.       From our perspective he is cleared for the planned procedure.  However, the paperwork from the surgery center is suggesting he will require cardiac clearance because of his A-fib.  He is going to clarify this with his surgeons office and then if need to make an appointment with his cardiologist.    Medication Instructions:   - Avoid herbs or non-directed vitamins one week prior to surgery    - Avoid aspirin containing medications or non-steroidal anti-inflammatory drugs one week preceding surgery    - May take tylenol for pain up until the night before surgery    - Antidepressants: Continue to take this medication on your normal schedule.  - Beta blockers:  Continue to take this medication on your normal schedule.  - Calcium channel blockers: Continue to take this medication on your normal schedule.  - H2 Blocker: Continue to take this medication on your normal schedule.  - Hyperlipidemia meds: Continue to take this medication on your normal schedule.       History of Present Illness     Pre-op Exam  Surgery: Cataract extraction  Anticipated Date of Surgery: 3/18,   Surgeon: Dr Enciso    Patient comes in today for preop evaluation for bilateral cataract surgery secondary to worsening vision.  He does have A-fib which has been well-controlled for a while.  He did have trouble with the blood thinners causing significant nosebleeds.  He  now just takes a baby aspirin.  He does follow routinely with a cardiologist, Dr. Ocampo.  Reports no new complaints today.    Previous history of bleeding disorders or clots?: No  Previous Anesthesia reaction?: No  Prolonged steroid use in the last 6 months?: No    Assessment of Cardiac Risk:   - Unstable or severe angina or MI in the last 6 weeks or history of stent placement in the last year?: No   - Decompensated heart failure (e.g. New onset heart failure, NYHA  Class IV heart failure, or worsening existing heart failure)?: No  - Significant arrhythmias such as high grade AV block, symptomatic ventricular arrhythmia, newly recognized ventricular tachycardia, supraventricular tachycardia with resting heart rate >100, or symptomatic bradycardia?: No  - Severe heart valve disease including aortic stenosis or symptomatic mitral stenosis?: No      Pre-operative Risk Factors:  Elevated-risk surgery: No    History of cerebrovascular disease: No    History of ischemic heart disease: No  Pre-operative treatment with insulin: No  Pre-operative creatinine >2 mg/dL: No    History of congestive heart failure: No    Medications of Perioperative Concern:   Anti-platelet (aspirin, clopidogrel, ticagrelor, prasugrel, cilostazol, dipyridamole), Calcium channel blockers and Beta blockers    Review of Systems   Respiratory:  Negative for shortness of breath.    Cardiovascular:  Negative for chest pain.   Gastrointestinal:  Negative for abdominal pain.     Past Medical History   Past Medical History:   Diagnosis Date   • Nosebleed    • Retinal hemorrhage, left     last assessed: 9/2/2015     Past Surgical History:   Procedure Laterality Date   • LITHOTRIPSY     • TONSILLECTOMY       History reviewed. No pertinent family history.  Social History     Tobacco Use   • Smoking status: Former     Current packs/day: 0.00     Average packs/day: 2.0 packs/day for 14.8 years (29.7 ttl pk-yrs)     Types: Cigarettes     Start date: 1970      "Quit date: 1984     Years since quittin.3   • Smokeless tobacco: Never   Vaping Use   • Vaping status: Never Used   Substance and Sexual Activity   • Alcohol use: Never   • Drug use: No   • Sexual activity: Not Currently     Comment: denied: histroy of high risk sexual behavior     Current Outpatient Medications on File Prior to Visit   Medication Sig   • amLODIPine (NORVASC) 2.5 mg tablet Take 1 tablet (2.5 mg total) by mouth daily   • aspirin (ECOTRIN LOW STRENGTH) 81 mg EC tablet    • atenolol (TENORMIN) 50 mg tablet Take 1.5 tablets (75 mg total) by mouth 2 (two) times a day   • atorvastatin (LIPITOR) 40 mg tablet TAKE 1 TABLET BY MOUTH DAILY AT  BEDTIME   • ciclopirox (PENLAC) 8 % solution Apply topically daily at bedtime   • famotidine (PEPCID) 20 mg tablet Take 20 mg by mouth daily   • FLUoxetine (PROzac) 20 mg capsule Take 1 capsule (20 mg total) by mouth daily   • guaiFENesin (MUCINEX) 600 mg 12 hr tablet Take 1 tablet (600 mg total) by mouth every 12 (twelve) hours (Patient not taking: Reported on 3/11/2025)   • guaiFENesin (ROBITUSSIN) 100 MG/5ML oral liquid Take 10 mL (200 mg total) by mouth daily at bedtime (Patient not taking: Reported on 3/11/2025)     No Known Allergies  Objective   /78 (BP Location: Left arm, Patient Position: Sitting, Cuff Size: Adult)   Pulse 103   Resp 17   Ht 5' 7\" (1.702 m)   Wt 72.6 kg (160 lb)   SpO2 96%   BMI 25.06 kg/m²     Physical Exam  Vitals and nursing note reviewed.   Constitutional:       Appearance: Normal appearance. He is well-developed.   HENT:      Right Ear: External ear normal.      Left Ear: External ear normal.      Mouth/Throat:      Pharynx: No oropharyngeal exudate.   Eyes:      Conjunctiva/sclera: Conjunctivae normal.      Pupils: Pupils are equal, round, and reactive to light.   Neck:      Vascular: No carotid bruit.   Cardiovascular:      Rate and Rhythm: Normal rate and regular rhythm.      Heart sounds: Normal heart sounds. "   Pulmonary:      Effort: Pulmonary effort is normal.      Breath sounds: Normal breath sounds. No wheezing or rales.   Abdominal:      Palpations: Abdomen is soft.      Tenderness: There is no abdominal tenderness.   Musculoskeletal:      Right lower leg: No edema.      Left lower leg: No edema.   Lymphadenopathy:      Cervical: No cervical adenopathy.   Skin:     Findings: No rash.   Neurological:      Mental Status: He is alert and oriented to person, place, and time.           Jj Jerez MD

## 2025-06-16 DIAGNOSIS — E78.2 MIXED HYPERLIPIDEMIA: ICD-10-CM

## 2025-06-17 RX ORDER — ATORVASTATIN CALCIUM 40 MG/1
40 TABLET, FILM COATED ORAL
Qty: 90 TABLET | Refills: 1 | Status: SHIPPED | OUTPATIENT
Start: 2025-06-17

## 2025-06-23 DIAGNOSIS — I10 ESSENTIAL HYPERTENSION: ICD-10-CM

## 2025-06-24 RX ORDER — AMLODIPINE BESYLATE 2.5 MG/1
2.5 TABLET ORAL DAILY
Qty: 90 TABLET | Refills: 1 | Status: SHIPPED | OUTPATIENT
Start: 2025-06-24

## 2025-07-07 ENCOUNTER — OFFICE VISIT (OUTPATIENT)
Dept: INTERNAL MEDICINE CLINIC | Facility: CLINIC | Age: 85
End: 2025-07-07
Payer: MEDICARE

## 2025-07-07 VITALS
WEIGHT: 162 LBS | BODY MASS INDEX: 25.43 KG/M2 | DIASTOLIC BLOOD PRESSURE: 68 MMHG | OXYGEN SATURATION: 96 % | HEART RATE: 107 BPM | RESPIRATION RATE: 18 BRPM | SYSTOLIC BLOOD PRESSURE: 114 MMHG | TEMPERATURE: 98.7 F | HEIGHT: 67 IN

## 2025-07-07 DIAGNOSIS — J84.10 PULMONARY FIBROSIS DETERMINED BY HIGH RESOLUTION COMPUTED TOMOGRAPHY (HCC): ICD-10-CM

## 2025-07-07 DIAGNOSIS — R41.0 CONFUSION: Primary | ICD-10-CM

## 2025-07-07 DIAGNOSIS — F41.9 ANXIETY: ICD-10-CM

## 2025-07-07 DIAGNOSIS — I48.91 ATRIAL FIBRILLATION, UNSPECIFIED TYPE (HCC): ICD-10-CM

## 2025-07-07 DIAGNOSIS — R41.3 MEMORY LOSS OR IMPAIRMENT: ICD-10-CM

## 2025-07-07 PROCEDURE — 99215 OFFICE O/P EST HI 40 MIN: CPT | Performed by: INTERNAL MEDICINE

## 2025-07-07 PROCEDURE — G2211 COMPLEX E/M VISIT ADD ON: HCPCS | Performed by: INTERNAL MEDICINE

## 2025-07-07 RX ORDER — FLUOXETINE 10 MG/1
10 CAPSULE ORAL DAILY
Qty: 30 CAPSULE | Refills: 3 | Status: SHIPPED | OUTPATIENT
Start: 2025-07-07

## 2025-07-07 RX ORDER — ALBUTEROL SULFATE 90 UG/1
2 INHALANT RESPIRATORY (INHALATION) 3 TIMES DAILY PRN
Qty: 18 G | Refills: 1 | Status: SHIPPED | OUTPATIENT
Start: 2025-07-07

## 2025-07-07 NOTE — PATIENT INSTRUCTIONS
Stop Robitussin  Stop Mucinex  Reduce the Fluoxetine (new prescription)  Try the inhaler definitely at bedtime

## 2025-07-07 NOTE — PROGRESS NOTES
Name: Raj Krause      : 1940      MRN: [id number]  Encounter Provider: Jj Jerez MD  Encounter Date: 2025   Encounter department: [facility]IC[facility] [place]  :  Assessment & Plan  Confusion  Unclear etiology.  Will first adjust several medicines and remove anything that may cause confusion.  Stop the cough medicines.  Reduce the Prozac.       Memory loss or impairment  With his hallucinations, need to consider Lewy body dementia.  Traveling is an issue for them.  They will only see specialist locally.  I told him he should not be driving while this is being sorted out  Orders:  •  CBC and differential; Future  •  Comprehensive metabolic panel; Future  •  TSH, 3rd generation; Future  •  Vitamin B12; Future  •  Folate; Future    Anxiety  Reduce the dose of medicine.  Orders:  •  FLUoxetine (PROzac) 10 mg capsule; Take 1 capsule (10 mg total) by mouth daily    Pulmonary fibrosis determined by high resolution computed tomography (HCC)  Since his wife noted shortness of breath, we will try an albuterol inhaler but they are reluctant to go back to pulmonary because several medicines were tried with no relief.    Orders:  •  albuterol (Ventolin HFA) 90 mcg/act inhaler; Inhale 2 puffs 3 (three) times a day as needed for shortness of breath    Atrial fibrillation, unspecified type (HCC)  Possibly not well-controlled and possible other reason for the shortness of breath at times.  They report he has an appointment with cardiology next week.  But I told his wife that if he has continued issues, she should take him to the ER.              History of Present Illness   Patient came in today with his wife, same-day appointment.  He appears to have more confusion although he questions it.  His wife notes that he seems more forgetful.  Still has episodes where he is even hallucinating or hearing music being played.  She is not sure how he is taking his medicines.  When she mentioned that, he  "noted that he was no longer getting much relief with the coughing with the guaifenesin products.  Initially he was alternating the 2 but it sounds like lately he has been taking both.  She notes that he seems short of breath.  She came home 1 day and he states he was short of breath and had some chest pain.  She felt his chest and the heart rate was pretty high.  He refused to go to the hospital.  She really does not drive and he is not driving lately.  Their community does have a van service.      Review of Systems   Respiratory:  Positive for shortness of breath.    Psychiatric/Behavioral:  Positive for confusion.        Objective   /68 (BP Location: Left arm, Patient Position: Sitting, Cuff Size: Adult)   Pulse (!) 107   Temp 98.7 °F (37.1 °C) (Tympanic)   Resp 18   Ht 5' 7\" (1.702 m)   Wt 73.5 kg (162 lb)   SpO2 96%   BMI 25.37 kg/m²      Physical Exam  Vitals and nursing note reviewed.   Constitutional:       Appearance: He is well-developed.     Neurological:      Mental Status: He is alert.     Psychiatric:      Comments:  he answers questions but admits he is confused.  Was visibly irritated with his wife as she relayed some things.       Administrative Statements   I have spent a total time of 40 minutes in caring for this patient on the day of the visit/encounter including Instructions for management, Patient and family education, Importance of tx compliance, Risk factor reductions, Counseling / Coordination of care, Documenting in the medical record, Reviewing/placing orders in the medical record (including tests, medications, and/or procedures), and Obtaining or reviewing history  .  "

## 2025-07-08 ENCOUNTER — APPOINTMENT (OUTPATIENT)
Dept: LAB | Facility: HOSPITAL | Age: 85
End: 2025-07-08
Attending: INTERNAL MEDICINE
Payer: MEDICARE

## 2025-07-08 DIAGNOSIS — R41.3 MEMORY LOSS OR IMPAIRMENT: ICD-10-CM

## 2025-07-08 LAB
ALBUMIN SERPL BCG-MCNC: 3.7 G/DL (ref 3.5–5)
ALP SERPL-CCNC: 183 U/L (ref 34–104)
ALT SERPL W P-5'-P-CCNC: 107 U/L (ref 7–52)
ANION GAP SERPL CALCULATED.3IONS-SCNC: 8 MMOL/L (ref 4–13)
AST SERPL W P-5'-P-CCNC: 33 U/L (ref 13–39)
BASOPHILS # BLD AUTO: 0.07 THOUSANDS/ÂΜL (ref 0–0.1)
BASOPHILS NFR BLD AUTO: 1 % (ref 0–1)
BILIRUB SERPL-MCNC: 1.1 MG/DL (ref 0.2–1)
BUN SERPL-MCNC: 25 MG/DL (ref 5–25)
CALCIUM SERPL-MCNC: 9 MG/DL (ref 8.4–10.2)
CHLORIDE SERPL-SCNC: 108 MMOL/L (ref 96–108)
CO2 SERPL-SCNC: 26 MMOL/L (ref 21–32)
CREAT SERPL-MCNC: 1.05 MG/DL (ref 0.6–1.3)
EOSINOPHIL # BLD AUTO: 0.14 THOUSAND/ÂΜL (ref 0–0.61)
EOSINOPHIL NFR BLD AUTO: 2 % (ref 0–6)
ERYTHROCYTE [DISTWIDTH] IN BLOOD BY AUTOMATED COUNT: 14.9 % (ref 11.6–15.1)
FOLATE SERPL-MCNC: 7.9 NG/ML
GFR SERPL CREATININE-BSD FRML MDRD: 64 ML/MIN/1.73SQ M
GLUCOSE SERPL-MCNC: 132 MG/DL (ref 65–140)
HCT VFR BLD AUTO: 42.4 % (ref 36.5–49.3)
HGB BLD-MCNC: 13.5 G/DL (ref 12–17)
IMM GRANULOCYTES # BLD AUTO: 0.06 THOUSAND/UL (ref 0–0.2)
IMM GRANULOCYTES NFR BLD AUTO: 1 % (ref 0–2)
LYMPHOCYTES # BLD AUTO: 1.13 THOUSANDS/ÂΜL (ref 0.6–4.47)
LYMPHOCYTES NFR BLD AUTO: 12 % (ref 14–44)
MCH RBC QN AUTO: 29 PG (ref 26.8–34.3)
MCHC RBC AUTO-ENTMCNC: 31.8 G/DL (ref 31.4–37.4)
MCV RBC AUTO: 91 FL (ref 82–98)
MONOCYTES # BLD AUTO: 0.9 THOUSAND/ÂΜL (ref 0.17–1.22)
MONOCYTES NFR BLD AUTO: 10 % (ref 4–12)
NEUTROPHILS # BLD AUTO: 7.02 THOUSANDS/ÂΜL (ref 1.85–7.62)
NEUTS SEG NFR BLD AUTO: 74 % (ref 43–75)
NRBC BLD AUTO-RTO: 0 /100 WBCS
PLATELET # BLD AUTO: 206 THOUSANDS/UL (ref 149–390)
PMV BLD AUTO: 8.6 FL (ref 8.9–12.7)
POTASSIUM SERPL-SCNC: 3.7 MMOL/L (ref 3.5–5.3)
PROT SERPL-MCNC: 6.7 G/DL (ref 6.4–8.4)
RBC # BLD AUTO: 4.65 MILLION/UL (ref 3.88–5.62)
SODIUM SERPL-SCNC: 142 MMOL/L (ref 135–147)
TSH SERPL DL<=0.05 MIU/L-ACNC: 3.11 UIU/ML (ref 0.45–4.5)
VIT B12 SERPL-MCNC: 291 PG/ML (ref 180–914)
WBC # BLD AUTO: 9.32 THOUSAND/UL (ref 4.31–10.16)

## 2025-07-08 PROCEDURE — 80053 COMPREHEN METABOLIC PANEL: CPT

## 2025-07-08 PROCEDURE — 82746 ASSAY OF FOLIC ACID SERUM: CPT

## 2025-07-08 PROCEDURE — 36415 COLL VENOUS BLD VENIPUNCTURE: CPT

## 2025-07-08 PROCEDURE — 82607 VITAMIN B-12: CPT

## 2025-07-08 PROCEDURE — 84443 ASSAY THYROID STIM HORMONE: CPT

## 2025-07-08 PROCEDURE — 85025 COMPLETE CBC W/AUTO DIFF WBC: CPT

## 2025-07-09 ENCOUNTER — TELEPHONE (OUTPATIENT)
Age: 85
End: 2025-07-09

## 2025-07-09 NOTE — TELEPHONE ENCOUNTER
I was on the phone with patients spouse, Lacy,  regarding blood work and she wanted me to mention to provider about patients possible fall they discussed during last office visit.      Lacy stated that the patient did in fact fall.  Patient had bruises on his forearm and his head.  He also had a scrape along his hairline.    Lacy just wanted to keep you posted.    Please advise, thank you.

## 2025-07-18 ENCOUNTER — APPOINTMENT (OUTPATIENT)
Dept: LAB | Facility: HOSPITAL | Age: 85
End: 2025-07-18
Payer: MEDICARE

## 2025-07-18 DIAGNOSIS — R74.01 TRANSAMINITIS: ICD-10-CM

## 2025-07-18 LAB
ALBUMIN SERPL BCG-MCNC: 3.6 G/DL (ref 3.5–5)
ALP SERPL-CCNC: 130 U/L (ref 34–104)
ALT SERPL W P-5'-P-CCNC: 19 U/L (ref 7–52)
AST SERPL W P-5'-P-CCNC: 17 U/L (ref 13–39)
BILIRUB DIRECT SERPL-MCNC: 0.09 MG/DL (ref 0–0.2)
BILIRUB SERPL-MCNC: 0.7 MG/DL (ref 0.2–1)
PROT SERPL-MCNC: 6.8 G/DL (ref 6.4–8.4)

## 2025-07-18 PROCEDURE — 36415 COLL VENOUS BLD VENIPUNCTURE: CPT

## 2025-07-18 PROCEDURE — 80076 HEPATIC FUNCTION PANEL: CPT

## 2025-07-21 ENCOUNTER — OFFICE VISIT (OUTPATIENT)
Dept: INTERNAL MEDICINE CLINIC | Facility: CLINIC | Age: 85
End: 2025-07-21
Payer: MEDICARE

## 2025-07-21 VITALS
SYSTOLIC BLOOD PRESSURE: 118 MMHG | OXYGEN SATURATION: 96 % | DIASTOLIC BLOOD PRESSURE: 74 MMHG | BODY MASS INDEX: 25.43 KG/M2 | WEIGHT: 162 LBS | HEART RATE: 100 BPM | RESPIRATION RATE: 18 BRPM | HEIGHT: 67 IN

## 2025-07-21 DIAGNOSIS — J84.10 PULMONARY FIBROSIS DETERMINED BY HIGH RESOLUTION COMPUTED TOMOGRAPHY (HCC): ICD-10-CM

## 2025-07-21 DIAGNOSIS — R41.3 MEMORY LOSS OR IMPAIRMENT: ICD-10-CM

## 2025-07-21 DIAGNOSIS — R41.0 CONFUSION: Primary | ICD-10-CM

## 2025-07-21 PROCEDURE — G2211 COMPLEX E/M VISIT ADD ON: HCPCS | Performed by: INTERNAL MEDICINE

## 2025-07-21 PROCEDURE — 99214 OFFICE O/P EST MOD 30 MIN: CPT | Performed by: INTERNAL MEDICINE

## 2025-07-21 RX ORDER — DONEPEZIL HYDROCHLORIDE 5 MG/1
5 TABLET, FILM COATED ORAL
Qty: 30 TABLET | Refills: 1 | Status: SHIPPED | OUTPATIENT
Start: 2025-07-21

## 2025-07-21 NOTE — PROGRESS NOTES
"Name: Raj Krause      : 1940      MRN: [id number]  Encounter Provider: Jj eJrez MD  Encounter Date: 2025   Encounter department: [facility] INTER[facility] MEDICINE [place]  :  Assessment & Plan  Confusion  Seems to have improved.  Reinforced no more Robitussin and/or Mucinex or similar.  Orders:  •  Ambulatory Referral to Neurology; Future    Memory loss or impairment  He may have some mild cognitive impairment but still a concern if he is not telling me or his wife that the hallucinations have truly stopped.  Thus, Lewy body may still be on the list.  They cannot travel outside of the area.  Will try and have them see neurology up here.  They wanted to try starting Aricept as well.  Orders:  •  Ambulatory Referral to Neurology; Future  •  donepezil (ARICEPT) 5 mg tablet; Take 1 tablet (5 mg total) by mouth daily at bedtime    Pulmonary fibrosis determined by high resolution computed tomography (HCC)  Since the guaifenesin is no longer an option for his cough, suggested being reevaluated by pulmonary.  They were agreeable to that.  Orders:  •  Ambulatory Referral to Pulmonology; Future           History of Present Illness   Patient comes in today for follow-up with his wife.  He states that his confusion has improved off of the guaifenesin products and the reduction in fluoxetine.  His wife agrees that he is not pointing hallucinations out to her anymore.  He states he is not hearing anything as well.  But she notes he still has memory issues.  He has stayed off alcohol as well.  Liver enzymes have returned to normal.      Review of Systems   Respiratory:  Negative for shortness of breath.    Cardiovascular:  Negative for chest pain.   Gastrointestinal:  Negative for abdominal pain.       Objective   /74 (BP Location: Left arm, Patient Position: Sitting, Cuff Size: Adult)   Pulse 100   Resp 18   Ht 5' 7\" (1.702 m)   Wt 73.5 kg (162 lb)   SpO2 96%   BMI 25.37 kg/m²      Physical " Exam  Vitals and nursing note reviewed.   Constitutional:       Appearance: Normal appearance.   HENT:      Right Ear: There is no impacted cerumen.      Left Ear: There is no impacted cerumen.     Cardiovascular:      Rate and Rhythm: Normal rate and regular rhythm.   Pulmonary:      Effort: Pulmonary effort is normal.      Breath sounds: Normal breath sounds.     Neurological:      Mental Status: He is alert.

## 2025-07-21 NOTE — ASSESSMENT & PLAN NOTE
Since the guaifenesin is no longer an option for his cough, suggested being reevaluated by pulmonary.  They were agreeable to that.  Orders:  •  Ambulatory Referral to Pulmonology; Future

## 2025-07-22 RX ORDER — DONEPEZIL HYDROCHLORIDE 5 MG/1
5 TABLET, FILM COATED ORAL
Qty: 90 TABLET | OUTPATIENT
Start: 2025-07-22